# Patient Record
Sex: FEMALE | Race: WHITE | NOT HISPANIC OR LATINO | Employment: FULL TIME | URBAN - METROPOLITAN AREA
[De-identification: names, ages, dates, MRNs, and addresses within clinical notes are randomized per-mention and may not be internally consistent; named-entity substitution may affect disease eponyms.]

---

## 2019-04-15 ENCOUNTER — HOSPITAL ENCOUNTER (OUTPATIENT)
Dept: FAMILY MEDICINE CLINIC | Facility: CLINIC | Age: 44
Setting detail: SPECIMEN
Discharge: HOME | End: 2019-04-15
Attending: FAMILY MEDICINE | Admitting: FAMILY MEDICINE

## 2019-04-15 LAB
ALBUMIN SERPL-MCNC: 3.4 G/DL (ref 3.5–4.8)
ALBUMIN/GLOB SERPL: 1.3 {RATIO} (ref 1–1.7)
ALP SERPL-CCNC: 49 IU/L (ref 32–91)
ALT SERPL-CCNC: 19 IU/L (ref 14–54)
ANION GAP SERPL CALC-SCNC: 14.2 MMOL/L (ref 10–20)
AST SERPL-CCNC: 14 IU/L (ref 15–41)
BASOPHILS # BLD AUTO: 0.1 10*3/UL (ref 0–0.2)
BASOPHILS NFR BLD AUTO: 1 % (ref 0–2)
BILIRUB SERPL-MCNC: 0.8 MG/DL (ref 0.3–1.2)
BUN SERPL-MCNC: 10 MG/DL (ref 8–20)
BUN/CREAT SERPL: 14.3 (ref 5.4–26.2)
CALCIUM SERPL-MCNC: 8.8 MG/DL (ref 8.9–10.3)
CHLORIDE SERPL-SCNC: 103 MMOL/L (ref 101–111)
CHOLEST SERPL-MCNC: 245 MG/DL
CHOLEST/HDLC SERPL: 4 {RATIO}
CONV CO2: 24 MMOL/L (ref 22–32)
CONV LDL CHOLESTEROL DIRECT: 170 MG/DL (ref 0–100)
CONV TOTAL PROTEIN: 6 G/DL (ref 6.1–7.9)
CREAT UR-MCNC: 0.7 MG/DL (ref 0.4–1)
DIFFERENTIAL METHOD BLD: (no result)
EOSINOPHIL # BLD AUTO: 0.1 10*3/UL (ref 0–0.3)
EOSINOPHIL # BLD AUTO: 2 % (ref 0–3)
ERYTHROCYTE [DISTWIDTH] IN BLOOD BY AUTOMATED COUNT: 17.1 % (ref 11.5–14.5)
GLOBULIN UR ELPH-MCNC: 2.6 G/DL (ref 2.5–3.8)
GLUCOSE SERPL-MCNC: 86 MG/DL (ref 65–99)
HCT VFR BLD AUTO: 35.8 % (ref 35–49)
HDLC SERPL-MCNC: 61 MG/DL
HGB BLD-MCNC: 12 G/DL (ref 12–15)
IRON SATN MFR SERPL: 14 % (ref 15–50)
IRON SERPL-MCNC: 53 UG/DL (ref 28–170)
LDLC/HDLC SERPL: 2.8 {RATIO}
LIPID INTERPRETATION: ABNORMAL
LYMPHOCYTES # BLD AUTO: 1.5 10*3/UL (ref 0.8–4.8)
LYMPHOCYTES NFR BLD AUTO: 28 % (ref 18–42)
MCH RBC QN AUTO: 29.5 PG (ref 26–32)
MCHC RBC AUTO-ENTMCNC: 33.5 G/DL (ref 32–36)
MCV RBC AUTO: 88.2 FL (ref 80–94)
MONOCYTES # BLD AUTO: 0.3 10*3/UL (ref 0.1–1.3)
MONOCYTES NFR BLD AUTO: 5 % (ref 2–11)
NEUTROPHILS # BLD AUTO: 3.5 10*3/UL (ref 2.3–8.6)
NEUTROPHILS NFR BLD AUTO: 64 % (ref 50–75)
NRBC BLD AUTO-RTO: 0 /100{WBCS}
NRBC/RBC NFR BLD MANUAL: 0 10*3/UL
PLATELET # BLD AUTO: 214 10*3/UL (ref 150–450)
PMV BLD AUTO: 7.8 FL (ref 7.4–10.4)
POTASSIUM SERPL-SCNC: 4.2 MMOL/L (ref 3.6–5.1)
RBC # BLD AUTO: 4.05 10*6/UL (ref 4–5.4)
SODIUM SERPL-SCNC: 137 MMOL/L (ref 136–144)
TIBC SERPL-MCNC: 377 UG/DL (ref 228–428)
TRIGL SERPL-MCNC: 120 MG/DL
VLDLC SERPL CALC-MCNC: 14.7 MG/DL
WBC # BLD AUTO: 5.4 10*3/UL (ref 4.5–11.5)

## 2019-06-03 ENCOUNTER — HOSPITAL ENCOUNTER (OUTPATIENT)
Dept: FAMILY MEDICINE CLINIC | Facility: CLINIC | Age: 44
Setting detail: SPECIMEN
Discharge: HOME | End: 2019-06-03
Attending: FAMILY MEDICINE | Admitting: FAMILY MEDICINE

## 2019-06-03 LAB
ALBUMIN SERPL-MCNC: 3.8 G/DL (ref 3.5–4.8)
ALBUMIN/GLOB SERPL: 1.5 {RATIO} (ref 1–1.7)
ALP SERPL-CCNC: 51 IU/L (ref 32–91)
ALT SERPL-CCNC: 19 IU/L (ref 14–54)
ANION GAP SERPL CALC-SCNC: 17.1 MMOL/L (ref 10–20)
AST SERPL-CCNC: 25 IU/L (ref 15–41)
BASOPHILS # BLD AUTO: 0 10*3/UL (ref 0–0.2)
BASOPHILS NFR BLD AUTO: 1 % (ref 0–2)
BILIRUB SERPL-MCNC: 0.7 MG/DL (ref 0.3–1.2)
BUN SERPL-MCNC: 14 MG/DL (ref 8–20)
BUN/CREAT SERPL: 17.5 (ref 5.4–26.2)
CALCIUM SERPL-MCNC: 9.2 MG/DL (ref 8.9–10.3)
CHLORIDE SERPL-SCNC: 105 MMOL/L (ref 101–111)
CHOLEST SERPL-MCNC: 130 MG/DL
CHOLEST/HDLC SERPL: 2.8 {RATIO}
CONV CO2: 19 MMOL/L (ref 22–32)
CONV LDL CHOLESTEROL DIRECT: 80 MG/DL (ref 0–100)
CONV TOTAL PROTEIN: 6.4 G/DL (ref 6.1–7.9)
CREAT UR-MCNC: 0.8 MG/DL (ref 0.4–1)
DIFFERENTIAL METHOD BLD: (no result)
EOSINOPHIL # BLD AUTO: 0.2 10*3/UL (ref 0–0.3)
EOSINOPHIL # BLD AUTO: 4 % (ref 0–3)
ERYTHROCYTE [DISTWIDTH] IN BLOOD BY AUTOMATED COUNT: 16.3 % (ref 11.5–14.5)
FERRITIN SERPL-MCNC: 15 NG/ML (ref 11–307)
GLOBULIN UR ELPH-MCNC: 2.6 G/DL (ref 2.5–3.8)
GLUCOSE SERPL-MCNC: 100 MG/DL (ref 65–99)
HCT VFR BLD AUTO: 36.3 % (ref 35–49)
HDLC SERPL-MCNC: 46 MG/DL
HGB BLD-MCNC: 11.7 G/DL (ref 12–15)
IRON SATN MFR SERPL: 13 % (ref 15–50)
IRON SERPL-MCNC: 55 UG/DL (ref 28–170)
LDLC/HDLC SERPL: 1.8 {RATIO}
LIPID INTERPRETATION: NORMAL
LYMPHOCYTES # BLD AUTO: 1.4 10*3/UL (ref 0.8–4.8)
LYMPHOCYTES NFR BLD AUTO: 36 % (ref 18–42)
MCH RBC QN AUTO: 29.3 PG (ref 26–32)
MCHC RBC AUTO-ENTMCNC: 32.1 G/DL (ref 32–36)
MCV RBC AUTO: 91.3 FL (ref 80–94)
MONOCYTES # BLD AUTO: 0.3 10*3/UL (ref 0.1–1.3)
MONOCYTES NFR BLD AUTO: 7 % (ref 2–11)
NEUTROPHILS # BLD AUTO: 2.1 10*3/UL (ref 2.3–8.6)
NEUTROPHILS NFR BLD AUTO: 52 % (ref 50–75)
NRBC BLD AUTO-RTO: 0 /100{WBCS}
NRBC/RBC NFR BLD MANUAL: 0 10*3/UL
PLATELET # BLD AUTO: 269 10*3/UL (ref 150–450)
PMV BLD AUTO: 7.8 FL (ref 7.4–10.4)
POTASSIUM SERPL-SCNC: 4.1 MMOL/L (ref 3.6–5.1)
RBC # BLD AUTO: 3.98 10*6/UL (ref 4–5.4)
SODIUM SERPL-SCNC: 137 MMOL/L (ref 136–144)
TIBC SERPL-MCNC: 409 UG/DL (ref 228–428)
TRIGL SERPL-MCNC: 80 MG/DL
VLDLC SERPL CALC-MCNC: 4 MG/DL
WBC # BLD AUTO: 4.1 10*3/UL (ref 4.5–11.5)

## 2019-06-04 LAB — 25(OH)D3 SERPL-MCNC: 48 NG/ML (ref 30–100)

## 2019-06-25 NOTE — TELEPHONE ENCOUNTER
Pt called. She has COPD and uses Symbicort inh. She wants to know if a rescue inhaler can be sent in. She does not have one and feels like she needs it at times.

## 2019-06-27 ENCOUNTER — HOSPITAL ENCOUNTER (EMERGENCY)
Facility: HOSPITAL | Age: 44
Discharge: HOME | End: 2019-06-27
Attending: NURSE PRACTITIONER | Admitting: EMERGENCY MEDICINE

## 2019-06-27 ENCOUNTER — APPOINTMENT (OUTPATIENT)
Dept: GENERAL RADIOLOGY | Facility: HOSPITAL | Age: 44
End: 2019-06-27

## 2019-06-27 VITALS
RESPIRATION RATE: 18 BRPM | DIASTOLIC BLOOD PRESSURE: 65 MMHG | TEMPERATURE: 99.3 F | WEIGHT: 262.35 LBS | SYSTOLIC BLOOD PRESSURE: 133 MMHG | BODY MASS INDEX: 46.48 KG/M2 | OXYGEN SATURATION: 99 % | HEIGHT: 63 IN | HEART RATE: 95 BPM

## 2019-06-27 DIAGNOSIS — J44.1 COPD WITH ACUTE EXACERBATION (HCC): Primary | ICD-10-CM

## 2019-06-27 LAB
ALBUMIN SERPL-MCNC: 3.8 G/DL (ref 3.5–4.8)
ALBUMIN/GLOB SERPL: 1.4 G/DL (ref 1–1.7)
ALP SERPL-CCNC: 50 U/L (ref 32–91)
ALT SERPL W P-5'-P-CCNC: 19 U/L (ref 14–54)
ANION GAP SERPL CALCULATED.3IONS-SCNC: 12.9 MMOL/L (ref 10–20)
AST SERPL-CCNC: 25 U/L (ref 15–41)
BASOPHILS # BLD AUTO: 0.1 10*3/MM3 (ref 0–0.2)
BASOPHILS NFR BLD AUTO: 1.3 % (ref 0–1.5)
BILIRUB SERPL-MCNC: 0.6 MG/DL (ref 0.3–1.2)
BNP SERPL-MCNC: 12 PG/ML
BUN BLD-MCNC: 8 MG/DL (ref 8–20)
BUN/CREAT SERPL: 10 (ref 5.4–26.2)
CALCIUM SPEC-SCNC: 9.3 MG/DL (ref 8.9–10.3)
CHLORIDE SERPL-SCNC: 110 MMOL/L (ref 101–111)
CO2 SERPL-SCNC: 20 MMOL/L (ref 22–32)
CREAT BLD-MCNC: 0.8 MG/DL (ref 0.4–1)
DEPRECATED RDW RBC AUTO: 47.7 FL (ref 37–54)
EOSINOPHIL # BLD AUTO: 0.1 10*3/MM3 (ref 0–0.4)
EOSINOPHIL NFR BLD AUTO: 2.7 % (ref 0.3–6.2)
ERYTHROCYTE [DISTWIDTH] IN BLOOD BY AUTOMATED COUNT: 15.4 % (ref 12.3–15.4)
GFR SERPL CREATININE-BSD FRML MDRD: 77 ML/MIN/1.73
GLOBULIN UR ELPH-MCNC: 2.8 GM/DL (ref 2.5–3.8)
GLUCOSE BLD-MCNC: 93 MG/DL (ref 65–99)
HCT VFR BLD AUTO: 33.8 % (ref 34–46.6)
HGB BLD-MCNC: 11.3 G/DL (ref 12–15.9)
HOLD SPECIMEN: NORMAL
HOLD SPECIMEN: NORMAL
LYMPHOCYTES # BLD AUTO: 1.2 10*3/MM3 (ref 0.7–3.1)
LYMPHOCYTES NFR BLD AUTO: 23.3 % (ref 19.6–45.3)
MCH RBC QN AUTO: 29.3 PG (ref 26.6–33)
MCHC RBC AUTO-ENTMCNC: 33.4 G/DL (ref 31.5–35.7)
MCV RBC AUTO: 87.6 FL (ref 79–97)
MONOCYTES # BLD AUTO: 0.4 10*3/MM3 (ref 0.1–0.9)
MONOCYTES NFR BLD AUTO: 7.5 % (ref 5–12)
NEUTROPHILS # BLD AUTO: 3.4 10*3/MM3 (ref 1.7–7)
NEUTROPHILS NFR BLD AUTO: 65.2 % (ref 42.7–76)
NRBC BLD AUTO-RTO: 0.1 /100 WBC (ref 0–0.2)
PLATELET # BLD AUTO: 220 10*3/MM3 (ref 140–450)
PMV BLD AUTO: 7.8 FL (ref 6–12)
POTASSIUM BLD-SCNC: 3.9 MMOL/L (ref 3.6–5.1)
PROT SERPL-MCNC: 6.6 G/DL (ref 6.1–7.9)
RBC # BLD AUTO: 3.85 10*6/MM3 (ref 3.77–5.28)
SODIUM BLD-SCNC: 139 MMOL/L (ref 136–144)
TROPONIN I SERPL-MCNC: <0.03 NG/ML (ref 0–0.03)
WBC NRBC COR # BLD: 5.3 10*3/MM3 (ref 3.4–10.8)
WHOLE BLOOD HOLD SPECIMEN: NORMAL
WHOLE BLOOD HOLD SPECIMEN: NORMAL

## 2019-06-27 PROCEDURE — 99284 EMERGENCY DEPT VISIT MOD MDM: CPT

## 2019-06-27 PROCEDURE — 83880 ASSAY OF NATRIURETIC PEPTIDE: CPT | Performed by: NURSE PRACTITIONER

## 2019-06-27 PROCEDURE — 25010000002 METHYLPREDNISOLONE PER 125 MG: Performed by: EMERGENCY MEDICINE

## 2019-06-27 PROCEDURE — 71045 X-RAY EXAM CHEST 1 VIEW: CPT

## 2019-06-27 PROCEDURE — 80053 COMPREHEN METABOLIC PANEL: CPT | Performed by: NURSE PRACTITIONER

## 2019-06-27 PROCEDURE — 96374 THER/PROPH/DIAG INJ IV PUSH: CPT

## 2019-06-27 PROCEDURE — 85025 COMPLETE CBC W/AUTO DIFF WBC: CPT | Performed by: NURSE PRACTITIONER

## 2019-06-27 PROCEDURE — 84484 ASSAY OF TROPONIN QUANT: CPT | Performed by: NURSE PRACTITIONER

## 2019-06-27 PROCEDURE — 94640 AIRWAY INHALATION TREATMENT: CPT

## 2019-06-27 PROCEDURE — 93005 ELECTROCARDIOGRAM TRACING: CPT | Performed by: NURSE PRACTITIONER

## 2019-06-27 RX ORDER — METHYLPREDNISOLONE SODIUM SUCCINATE 125 MG/2ML
125 INJECTION, POWDER, LYOPHILIZED, FOR SOLUTION INTRAMUSCULAR; INTRAVENOUS ONCE
Status: COMPLETED | OUTPATIENT
Start: 2019-06-27 | End: 2019-06-27

## 2019-06-27 RX ORDER — ALBUTEROL SULFATE 90 UG/1
2 AEROSOL, METERED RESPIRATORY (INHALATION) EVERY 6 HOURS PRN
Qty: 1 INHALER | Refills: 0 | Status: SHIPPED | OUTPATIENT
Start: 2019-06-27 | End: 2019-07-03 | Stop reason: SDUPTHER

## 2019-06-27 RX ORDER — ALBUTEROL SULFATE 90 UG/1
2 AEROSOL, METERED RESPIRATORY (INHALATION) EVERY 6 HOURS PRN
Qty: 1 INHALER | Refills: 0 | Status: SHIPPED | OUTPATIENT
Start: 2019-06-27 | End: 2020-01-27 | Stop reason: SDUPTHER

## 2019-06-27 RX ORDER — IPRATROPIUM BROMIDE AND ALBUTEROL SULFATE 2.5; .5 MG/3ML; MG/3ML
3 SOLUTION RESPIRATORY (INHALATION) ONCE
Status: COMPLETED | OUTPATIENT
Start: 2019-06-27 | End: 2019-06-27

## 2019-06-27 RX ORDER — SODIUM CHLORIDE 0.9 % (FLUSH) 0.9 %
10 SYRINGE (ML) INJECTION AS NEEDED
Status: DISCONTINUED | OUTPATIENT
Start: 2019-06-27 | End: 2019-06-27 | Stop reason: HOSPADM

## 2019-06-27 RX ORDER — ALBUTEROL SULFATE 90 UG/1
2 AEROSOL, METERED RESPIRATORY (INHALATION) EVERY 4 HOURS PRN
Qty: 1 INHALER | Refills: 3 | Status: SHIPPED | OUTPATIENT
Start: 2019-06-27 | End: 2019-06-27 | Stop reason: SDUPTHER

## 2019-06-27 RX ADMIN — METHYLPREDNISOLONE SODIUM SUCCINATE 125 MG: 125 INJECTION, POWDER, FOR SOLUTION INTRAMUSCULAR; INTRAVENOUS at 19:59

## 2019-06-27 RX ADMIN — IPRATROPIUM BROMIDE AND ALBUTEROL SULFATE 3 ML: .5; 3 SOLUTION RESPIRATORY (INHALATION) at 19:46

## 2019-07-03 ENCOUNTER — OFFICE VISIT (OUTPATIENT)
Dept: FAMILY MEDICINE CLINIC | Facility: CLINIC | Age: 44
End: 2019-07-03

## 2019-07-03 VITALS
HEIGHT: 63 IN | SYSTOLIC BLOOD PRESSURE: 136 MMHG | DIASTOLIC BLOOD PRESSURE: 81 MMHG | WEIGHT: 255.2 LBS | HEART RATE: 94 BPM | TEMPERATURE: 97.9 F | BODY MASS INDEX: 45.22 KG/M2 | OXYGEN SATURATION: 97 %

## 2019-07-03 DIAGNOSIS — J44.1 COPD EXACERBATION (HCC): Primary | ICD-10-CM

## 2019-07-03 DIAGNOSIS — J44.9 CHRONIC OBSTRUCTIVE PULMONARY DISEASE, UNSPECIFIED COPD TYPE (HCC): ICD-10-CM

## 2019-07-03 PROBLEM — F41.0 PANIC ATTACK: Status: ACTIVE | Noted: 2019-04-10

## 2019-07-03 PROBLEM — G47.9 SLEEPING DIFFICULTIES: Status: ACTIVE | Noted: 2019-06-05

## 2019-07-03 PROBLEM — R20.2 HAND TINGLING: Status: ACTIVE | Noted: 2019-05-08

## 2019-07-03 PROBLEM — E78.5 DYSLIPIDEMIA: Status: ACTIVE | Noted: 2019-04-16

## 2019-07-03 PROBLEM — E55.9 VITAMIN D DEFICIENCY: Status: ACTIVE | Noted: 2019-04-16

## 2019-07-03 PROBLEM — F41.9 ANXIETY: Status: ACTIVE | Noted: 2019-05-08

## 2019-07-03 PROBLEM — E61.1 IRON DEFICIENCY: Status: ACTIVE | Noted: 2019-04-16

## 2019-07-03 PROBLEM — J45.909 ASTHMA: Status: ACTIVE | Noted: 2019-04-03

## 2019-07-03 PROBLEM — S63.502A UNSPECIFIED SPRAIN OF LEFT WRIST, INITIAL ENCOUNTER: Status: ACTIVE | Noted: 2019-04-10

## 2019-07-03 PROBLEM — M79.673 PAIN OF FOOT: Status: ACTIVE | Noted: 2019-04-10

## 2019-07-03 PROCEDURE — 99213 OFFICE O/P EST LOW 20 MIN: CPT | Performed by: NURSE PRACTITIONER

## 2019-07-03 RX ORDER — ERGOCALCIFEROL 1.25 MG/1
1 CAPSULE ORAL
COMMUNITY
Start: 2019-04-16 | End: 2020-04-08

## 2019-07-03 RX ORDER — THEOPHYLLINE 300 MG/1
300 TABLET, EXTENDED RELEASE ORAL DAILY
Refills: 11 | COMMUNITY
Start: 2019-05-14 | End: 2022-09-19

## 2019-07-03 RX ORDER — BUPROPION HYDROCHLORIDE 150 MG/1
150 TABLET, EXTENDED RELEASE ORAL 2 TIMES DAILY
Refills: 3 | COMMUNITY
Start: 2019-05-06 | End: 2019-08-07 | Stop reason: SDUPTHER

## 2019-07-03 RX ORDER — IPRATROPIUM BROMIDE AND ALBUTEROL SULFATE 2.5; .5 MG/3ML; MG/3ML
SOLUTION RESPIRATORY (INHALATION)
Refills: 0 | COMMUNITY
Start: 2019-03-30 | End: 2019-07-03

## 2019-07-03 RX ORDER — OMEGA-3 FATTY ACIDS/FISH OIL 300-1000MG
CAPSULE ORAL
COMMUNITY
End: 2020-04-02

## 2019-07-03 RX ORDER — ALBUTEROL SULFATE 0.63 MG/3ML
1 SOLUTION RESPIRATORY (INHALATION) EVERY 6 HOURS PRN
Qty: 360 ML | Refills: 3 | Status: SHIPPED | OUTPATIENT
Start: 2019-07-03

## 2019-07-03 RX ORDER — ROSUVASTATIN CALCIUM 10 MG/1
10 TABLET, COATED ORAL
Refills: 3 | COMMUNITY
Start: 2019-05-14 | End: 2019-08-14 | Stop reason: SDUPTHER

## 2019-07-03 RX ORDER — FERROUS SULFATE 325(65) MG
TABLET ORAL EVERY 12 HOURS
COMMUNITY
Start: 2019-04-16 | End: 2022-09-19

## 2019-07-03 RX ORDER — NAPROXEN SODIUM 220 MG
220 TABLET ORAL
COMMUNITY
End: 2020-04-02

## 2019-07-03 RX ORDER — BUDESONIDE AND FORMOTEROL FUMARATE DIHYDRATE 160; 4.5 UG/1; UG/1
2 AEROSOL RESPIRATORY (INHALATION) 2 TIMES DAILY
Qty: 1 INHALER | Refills: 2 | Status: SHIPPED | OUTPATIENT
Start: 2019-07-03 | End: 2020-01-27 | Stop reason: SDUPTHER

## 2019-07-03 RX ORDER — FEXOFENADINE HYDROCHLORIDE 60 MG/1
TABLET, FILM COATED ORAL
COMMUNITY
Start: 2019-05-08

## 2019-07-03 RX ORDER — PREDNISONE 10 MG/1
TABLET ORAL
Refills: 0 | COMMUNITY
Start: 2019-06-28 | End: 2019-07-30

## 2019-07-03 RX ORDER — BUDESONIDE AND FORMOTEROL FUMARATE DIHYDRATE 160; 4.5 UG/1; UG/1
2 AEROSOL RESPIRATORY (INHALATION) 2 TIMES DAILY
Refills: 1 | COMMUNITY
Start: 2019-04-26 | End: 2019-07-03 | Stop reason: SDUPTHER

## 2019-07-03 NOTE — PROGRESS NOTES
Subjective   Grace Clement is a 44 y.o. female.     Pt is here today with c/o SOA.  She went to [facility] on 6/27 with the same complaints.  For about 2 weeks she has been experiencing more shortness of air since the weather has been hotter.  She quit smoking 3 months ago.  She was given a taper dose of steroid in the hospital and was started on Ventolin.  She also takes symbicort.  She reports that her symptoms have improved some but she is still experiencing SOA.  She is having to use her nebulizer 3-4 times a day and albuterol every 1.5 hours.  She does not recall having pulmonary function tests done in the past.          The following portions of the patient's history were reviewed and updated as appropriate: allergies, current medications, past family history, past medical history, past social history, past surgical history and problem list.    Review of Systems   Constitutional: Negative for appetite change, chills, fatigue and fever.   HENT: Negative for congestion, ear pain, hearing loss, postnasal drip, rhinorrhea, sinus pressure, sore throat and trouble swallowing.    Eyes: Negative for blurred vision, double vision, pain, discharge, itching and visual disturbance.   Respiratory: Positive for cough, chest tightness, shortness of breath and wheezing.    Cardiovascular: Negative for chest pain and palpitations.   Gastrointestinal: Negative for abdominal pain, blood in stool, constipation, diarrhea, nausea and vomiting.   Genitourinary: Negative for dysuria, flank pain, frequency and urgency.   Musculoskeletal: Negative for arthralgias and myalgias.   Skin: Negative for rash and skin lesions.   Neurological: Negative for dizziness, weakness, numbness and headache.   Psychiatric/Behavioral: Negative for stress. The patient is not nervous/anxious.        Objective   /81 (BP Location: Left arm, Patient Position: Sitting, Cuff Size: Large Adult)   Pulse 94   Temp 97.9 °F (36.6 °C) (Oral)   Ht 160 cm  "(63\")   Wt 116 kg (255 lb 3.2 oz)   LMP 06/13/2019   SpO2 97%   BMI 45.21 kg/m²   Physical Exam   Constitutional: She is oriented to person, place, and time. She appears well-developed and well-nourished. No distress.   HENT:   Head: Normocephalic and atraumatic.   Eyes: Conjunctivae and EOM are normal. Pupils are equal, round, and reactive to light. Right eye exhibits no discharge. Left eye exhibits no discharge.   Neck: Normal range of motion. Neck supple.   Cardiovascular: Normal rate and regular rhythm.   Pulmonary/Chest: Effort normal and breath sounds normal. No respiratory distress. She has no wheezes. She has no rales.   Abdominal: Soft. Normal appearance and bowel sounds are normal. She exhibits no distension. There is no tenderness.   Musculoskeletal: Normal range of motion.   Neurological: She is alert and oriented to person, place, and time.   Skin: Skin is warm and dry. She is not diaphoretic.   Psychiatric: She has a normal mood and affect. Her behavior is normal. Thought content normal.   Vitals reviewed.        Assessment/Plan   Problems Addressed this Visit     None      Visit Diagnoses     COPD exacerbation (CMS/[facility])    -  Primary    continue steroid taper from ER  stop duoneb, start albuterol neb  cont ventolin as needed  start spiriva daily  cont symbicort  PFTs ordered    Relevant Medications    fexofenadine (ALLEGRA) 60 MG tablet    predniSONE (DELTASONE) 10 MG tablet    theophylline (THEODUR) 300 MG 12 hr tablet    tiotropium (SPIRIVA HANDIHALER) 18 MCG per inhalation capsule    albuterol (ACCUNEB) 0.63 MG/3ML nebulizer solution    SYMBICORT 160-4.5 MCG/ACT inhaler    Other Relevant Orders    Full Pulmonary Function Test With Bronchodilator    Chronic obstructive pulmonary disease, unspecified COPD type (CMS/[facility])        Relevant Medications    fexofenadine (ALLEGRA) 60 MG tablet    predniSONE (DELTASONE) 10 MG tablet    theophylline (THEODUR) 300 MG 12 hr tablet    tiotropium (SPIRIVA " HANDIHALER) 18 MCG per inhalation capsule    albuterol (ACCUNEB) 0.63 MG/3ML nebulizer solution    SYMBICORT 160-4.5 MCG/ACT inhaler    Other Relevant Orders    Full Pulmonary Function Test With Bronchodilator              Diagnoses and all orders for this visit:    1. COPD exacerbation (CMS/[facility]) (Primary)  Comments:  continue steroid taper from ER  stop duoneb, start albuterol neb  cont ventolin as needed  start spiriva daily  cont symbicort  PFTs ordered  Orders:  -     Full Pulmonary Function Test With Bronchodilator; Future  -     tiotropium (SPIRIVA HANDIHALER) 18 MCG per inhalation capsule; Place 1 capsule into inhaler and inhale Daily.  Dispense: 30 capsule; Refill: 1  -     albuterol (ACCUNEB) 0.63 MG/3ML nebulizer solution; Take 3 mL by nebulization Every 6 (Six) Hours As Needed for Wheezing.  Dispense: 360 mL; Refill: 3    2. Chronic obstructive pulmonary disease, unspecified COPD type (CMS/[facility])  -     Full Pulmonary Function Test With Bronchodilator; Future  -     tiotropium (SPIRIVA HANDIHALER) 18 MCG per inhalation capsule; Place 1 capsule into inhaler and inhale Daily.  Dispense: 30 capsule; Refill: 1  -     albuterol (ACCUNEB) 0.63 MG/3ML nebulizer solution; Take 3 mL by nebulization Every 6 (Six) Hours As Needed for Wheezing.  Dispense: 360 mL; Refill: 3    Other orders  -     SYMBICORT 160-4.5 MCG/ACT inhaler; Inhale 2 puffs 2 (Two) Times a Day.  Dispense: 1 inhaler; Refill: 2

## 2019-07-03 NOTE — PATIENT INSTRUCTIONS
Stop duoneb  Start albuterol neb every 6 hrs as needed- space out if using ventolin  Start spiriva daily  Continue symbicort  Get pulmonary function tests  If symptoms worsen follow up or make appt with Dr. Kadie Harper at [phone], option 3, then option 1 and let me know how you are doing next week

## 2019-07-08 ENCOUNTER — HOSPITAL ENCOUNTER (OUTPATIENT)
Dept: RESPIRATORY THERAPY | Facility: HOSPITAL | Age: 44
Discharge: HOME | End: 2019-07-08
Admitting: NURSE PRACTITIONER

## 2019-07-08 VITALS
WEIGHT: 257 LBS | BODY MASS INDEX: 45.54 KG/M2 | OXYGEN SATURATION: 99 % | RESPIRATION RATE: 12 BRPM | HEART RATE: 89 BPM | HEIGHT: 63 IN

## 2019-07-08 DIAGNOSIS — J44.1 COPD EXACERBATION (HCC): ICD-10-CM

## 2019-07-08 DIAGNOSIS — J44.9 CHRONIC OBSTRUCTIVE PULMONARY DISEASE, UNSPECIFIED COPD TYPE (HCC): ICD-10-CM

## 2019-07-08 PROCEDURE — 94729 DIFFUSING CAPACITY: CPT

## 2019-07-08 PROCEDURE — 94726 PLETHYSMOGRAPHY LUNG VOLUMES: CPT

## 2019-07-08 PROCEDURE — 94060 EVALUATION OF WHEEZING: CPT

## 2019-07-08 RX ORDER — ALBUTEROL SULFATE 2.5 MG/3ML
2.5 SOLUTION RESPIRATORY (INHALATION) ONCE
Status: COMPLETED | OUTPATIENT
Start: 2019-07-08 | End: 2019-07-08

## 2019-07-08 RX ADMIN — ALBUTEROL SULFATE 2.5 MG: 2.5 SOLUTION RESPIRATORY (INHALATION) at 08:18

## 2019-07-11 ENCOUNTER — TELEPHONE (OUTPATIENT)
Dept: FAMILY MEDICINE CLINIC | Facility: CLINIC | Age: 44
End: 2019-07-11

## 2019-07-11 NOTE — TELEPHONE ENCOUNTER
PFT shows mild to moderate COPD and asthma, if patient is not improving with the use of inhalers we will send to pulmonary

## 2019-07-26 RX ORDER — BUDESONIDE AND FORMOTEROL FUMARATE DIHYDRATE 160; 4.5 UG/1; UG/1
2 AEROSOL RESPIRATORY (INHALATION) 2 TIMES DAILY
Qty: 1 INHALER | Refills: 2 | OUTPATIENT
Start: 2019-07-26

## 2019-07-30 ENCOUNTER — OFFICE VISIT (OUTPATIENT)
Dept: FAMILY MEDICINE CLINIC | Facility: CLINIC | Age: 44
End: 2019-07-30

## 2019-07-30 VITALS
OXYGEN SATURATION: 99 % | WEIGHT: 261 LBS | DIASTOLIC BLOOD PRESSURE: 83 MMHG | SYSTOLIC BLOOD PRESSURE: 152 MMHG | HEIGHT: 63 IN | BODY MASS INDEX: 46.25 KG/M2 | HEART RATE: 92 BPM

## 2019-07-30 DIAGNOSIS — J44.9 CHRONIC OBSTRUCTIVE PULMONARY DISEASE, UNSPECIFIED COPD TYPE (HCC): ICD-10-CM

## 2019-07-30 DIAGNOSIS — R20.9 BILATERAL COLD FEET: Primary | ICD-10-CM

## 2019-07-30 DIAGNOSIS — I10 HYPERTENSION, UNSPECIFIED TYPE: ICD-10-CM

## 2019-07-30 DIAGNOSIS — R60.9 EDEMA, UNSPECIFIED TYPE: ICD-10-CM

## 2019-07-30 PROCEDURE — 99213 OFFICE O/P EST LOW 20 MIN: CPT | Performed by: NURSE PRACTITIONER

## 2019-07-30 RX ORDER — HYDROCHLOROTHIAZIDE 25 MG/1
25 TABLET ORAL DAILY
Qty: 30 TABLET | Refills: 1 | Status: SHIPPED | OUTPATIENT
Start: 2019-07-30 | End: 2019-07-30

## 2019-07-30 RX ORDER — MONTELUKAST SODIUM 10 MG/1
10 TABLET ORAL NIGHTLY
Qty: 30 TABLET | Refills: 1 | Status: SHIPPED | OUTPATIENT
Start: 2019-07-30 | End: 2019-08-22 | Stop reason: SDUPTHER

## 2019-07-30 NOTE — PROGRESS NOTES
"Subjective   Grace Clement is a 44 y.o. female.     Pt is here today with c/o cold feet and COPD.  She states that her feet have been ice cold and cramping off and on randomly for the last 2-3 weeks.  She states that it feels like a keven horse in her foot with no aggravating factors. It happens on a daily basis.   She states that when she is up moving through the day her feet do not feel cold.  She reports that they get cold when she has been sitting for a while. She reports that she keeps her house cold.  She states that they feel frozen internally but are not cold to touch. She has some occasional swelling in her legs. Pt wanted to discuss the results of her PFTs.  I advised her that she has mild to moderate COPD and asthma.  She states that the Spiriva has been doing wonderful for her.  She still does have a cough that comes and goes.  She feels like it is allergy related.         The following portions of the patient's history were reviewed and updated as appropriate: allergies, current medications, past family history, past medical history, past social history, past surgical history and problem list.    Review of Systems   Constitutional: Positive for fatigue. Negative for fever.   HENT: Positive for postnasal drip and sinus pressure.    Respiratory: Positive for cough, shortness of breath and wheezing. Negative for chest tightness.    Cardiovascular: Negative for chest pain and palpitations.   Skin: Negative for color change.   Neurological: Negative for dizziness, light-headedness and headache.        Cold sensation in feet       Objective   /83 (BP Location: Left arm, Patient Position: Sitting, Cuff Size: Large Adult)   Pulse 92   Ht 160 cm (63\")   Wt 118 kg (261 lb)   SpO2 99%   BMI 46.23 kg/m²   Physical Exam   Constitutional: She appears well-developed and well-nourished.   HENT:   Head: Normocephalic and atraumatic.   Eyes: EOM are normal. Pupils are equal, round, and reactive to light. "   Neck: Normal range of motion. Neck supple.   Cardiovascular: Normal rate and regular rhythm.   Pulmonary/Chest: Effort normal and breath sounds normal.   Abdominal: Soft. Bowel sounds are normal.   Musculoskeletal: She exhibits edema (1 pitting BLE).   Skin: Skin is warm and dry.   Psychiatric: She has a normal mood and affect. Her behavior is normal. Judgment and thought content normal.         Assessment/Plan   Problems Addressed this Visit     None      Visit Diagnoses     Bilateral cold feet    -  Primary    warm to touch  feels cold internally  wear compression stockings  drink plenty of water  normal pulses  elevate feet when possible    Chronic obstructive pulmonary disease, unspecified COPD type (CMS/HCC)        cont current meds, add singuilar for COPD and asthma    Relevant Medications    montelukast (SINGULAIR) 10 MG tablet    Edema, unspecified type        wear compression stockings  limit sodium intake    Hypertension, unspecified type        limit sodium intake              Diagnoses and all orders for this visit:    1. Bilateral cold feet (Primary)  Comments:  warm to touch  feels cold internally  wear compression stockings  drink plenty of water  normal pulses  elevate feet when possible    2. Chronic obstructive pulmonary disease, unspecified COPD type (CMS/HCC)  Comments:  cont current meds, add singuilar for COPD and asthma  Orders:  -     montelukast (SINGULAIR) 10 MG tablet; Take 1 tablet by mouth Every Night.  Dispense: 30 tablet; Refill: 1    3. Edema, unspecified type  Comments:  wear compression stockings  limit sodium intake  Orders:  -     Discontinue: hydrochlorothiazide (HYDRODIURIL) 25 MG tablet; Take 1 tablet by mouth Daily.  Dispense: 30 tablet; Refill: 1    4. Hypertension, unspecified type  Comments:  limit sodium intake  Orders:  -     Discontinue: hydrochlorothiazide (HYDRODIURIL) 25 MG tablet; Take 1 tablet by mouth Daily.  Dispense: 30 tablet; Refill: 1

## 2019-07-30 NOTE — PATIENT INSTRUCTIONS
Cont to use Flonase  Start taking Singulair  Start taking magnesium 400 mg OTC at night to help with keven horses  Drink plenty of water  Wear compression stockings to help with swelling and circulation  Limit sodium intake  Elevate feet when possible  Call if symptoms not improving

## 2019-08-01 RX ORDER — ERGOCALCIFEROL 1.25 MG/1
CAPSULE ORAL
Qty: 12 CAPSULE | Refills: 1 | OUTPATIENT
Start: 2019-08-01

## 2019-08-07 RX ORDER — BUPROPION HYDROCHLORIDE 150 MG/1
TABLET, EXTENDED RELEASE ORAL
Qty: 180 TABLET | Refills: 1 | Status: SHIPPED | OUTPATIENT
Start: 2019-08-07 | End: 2020-04-29 | Stop reason: SDUPTHER

## 2019-08-14 RX ORDER — ROSUVASTATIN CALCIUM 10 MG/1
TABLET, COATED ORAL
Qty: 90 TABLET | Refills: 1 | Status: SHIPPED | OUTPATIENT
Start: 2019-08-14 | End: 2020-04-29 | Stop reason: SDUPTHER

## 2019-08-22 DIAGNOSIS — J44.9 CHRONIC OBSTRUCTIVE PULMONARY DISEASE, UNSPECIFIED COPD TYPE (HCC): ICD-10-CM

## 2019-08-22 DIAGNOSIS — J44.1 COPD EXACERBATION (HCC): ICD-10-CM

## 2019-08-22 RX ORDER — MONTELUKAST SODIUM 10 MG/1
10 TABLET ORAL NIGHTLY
Qty: 30 TABLET | Refills: 1 | Status: SHIPPED | OUTPATIENT
Start: 2019-08-22 | End: 2019-09-09 | Stop reason: SDUPTHER

## 2019-08-22 NOTE — TELEPHONE ENCOUNTER
Refill spiriva and singulair  Also requesting 90 day supply on hctz 25mg tab -take 1 po daily.  I don't see it on active med list?  Lv:7/30/19

## 2019-08-22 NOTE — TELEPHONE ENCOUNTER
Please call and verify with pt on he Hctz.  She was not supposed to start that.  I had ordered it but canceled it shortly after.  If she is taking is he swelling better or cramping worse?  Also how is her BP.

## 2019-09-09 DIAGNOSIS — J44.1 COPD EXACERBATION (HCC): ICD-10-CM

## 2019-09-09 DIAGNOSIS — J44.9 CHRONIC OBSTRUCTIVE PULMONARY DISEASE, UNSPECIFIED COPD TYPE (HCC): ICD-10-CM

## 2019-09-09 RX ORDER — MONTELUKAST SODIUM 10 MG/1
10 TABLET ORAL NIGHTLY
Qty: 30 TABLET | Refills: 1 | Status: SHIPPED | OUTPATIENT
Start: 2019-09-09 | End: 2019-09-10 | Stop reason: SDUPTHER

## 2019-09-10 DIAGNOSIS — J44.9 CHRONIC OBSTRUCTIVE PULMONARY DISEASE, UNSPECIFIED COPD TYPE (HCC): ICD-10-CM

## 2019-09-10 DIAGNOSIS — J44.1 COPD EXACERBATION (HCC): ICD-10-CM

## 2019-09-10 RX ORDER — MONTELUKAST SODIUM 10 MG/1
10 TABLET ORAL NIGHTLY
Qty: 90 TABLET | Refills: 1 | Status: SHIPPED | OUTPATIENT
Start: 2019-09-10 | End: 2020-01-27 | Stop reason: SDUPTHER

## 2020-01-27 ENCOUNTER — OFFICE VISIT (OUTPATIENT)
Dept: FAMILY MEDICINE CLINIC | Facility: CLINIC | Age: 45
End: 2020-01-27

## 2020-01-27 VITALS
HEIGHT: 62 IN | HEART RATE: 81 BPM | WEIGHT: 283.2 LBS | RESPIRATION RATE: 16 BRPM | BODY MASS INDEX: 52.12 KG/M2 | DIASTOLIC BLOOD PRESSURE: 77 MMHG | TEMPERATURE: 98 F | OXYGEN SATURATION: 98 % | SYSTOLIC BLOOD PRESSURE: 132 MMHG

## 2020-01-27 DIAGNOSIS — D64.9 ANEMIA, UNSPECIFIED TYPE: ICD-10-CM

## 2020-01-27 DIAGNOSIS — R20.2 NUMBNESS AND TINGLING: Primary | ICD-10-CM

## 2020-01-27 DIAGNOSIS — J44.9 CHRONIC OBSTRUCTIVE PULMONARY DISEASE, UNSPECIFIED COPD TYPE (HCC): ICD-10-CM

## 2020-01-27 DIAGNOSIS — E66.01 MORBID OBESITY WITH BMI OF 50.0-59.9, ADULT (HCC): ICD-10-CM

## 2020-01-27 DIAGNOSIS — E78.5 HYPERLIPIDEMIA, UNSPECIFIED HYPERLIPIDEMIA TYPE: ICD-10-CM

## 2020-01-27 DIAGNOSIS — R79.89 LOW VITAMIN D LEVEL: ICD-10-CM

## 2020-01-27 DIAGNOSIS — R20.0 NUMBNESS AND TINGLING: Primary | ICD-10-CM

## 2020-01-27 PROCEDURE — 99214 OFFICE O/P EST MOD 30 MIN: CPT | Performed by: NURSE PRACTITIONER

## 2020-01-27 RX ORDER — MONTELUKAST SODIUM 10 MG/1
10 TABLET ORAL NIGHTLY
Qty: 90 TABLET | Refills: 1 | Status: SHIPPED | OUTPATIENT
Start: 2020-01-27 | End: 2022-09-19 | Stop reason: SDUPTHER

## 2020-01-27 RX ORDER — ALBUTEROL SULFATE 90 UG/1
2 AEROSOL, METERED RESPIRATORY (INHALATION) EVERY 6 HOURS PRN
Qty: 1 INHALER | Refills: 0 | Status: SHIPPED | OUTPATIENT
Start: 2020-01-27 | End: 2020-01-27

## 2020-01-27 RX ORDER — BUDESONIDE AND FORMOTEROL FUMARATE DIHYDRATE 160; 4.5 UG/1; UG/1
2 AEROSOL RESPIRATORY (INHALATION) 2 TIMES DAILY
Qty: 1 INHALER | Refills: 2 | Status: SHIPPED | OUTPATIENT
Start: 2020-01-27 | End: 2022-09-19

## 2020-01-27 RX ORDER — ALBUTEROL SULFATE 90 UG/1
2 AEROSOL, METERED RESPIRATORY (INHALATION) EVERY 6 HOURS PRN
Qty: 1 INHALER | Refills: 0 | Status: SHIPPED | OUTPATIENT
Start: 2020-01-27 | End: 2021-05-06

## 2020-01-27 NOTE — PROGRESS NOTES
"Subjective   Grace Clement is a 44 y.o. female.     Pt is here today with c/o bill feet numbness and tingling and \"cold sensation.\"  She has been having these sensations for about 1 year.  She has tried wearing compression hose, which help some.  Patient does not have a history of diabetes or back issues.  She denies any pain in the legs.  Has good pulses and color.    Pt states that she is at her heaviest and is wanting to lose weight.  She and her  have been talking about joining a gym.  She is trying to reduce her soda intake.  She has been trying to make diet changes.  She bakes more food and does not jalloh food.  Denies eating fast food often.        The following portions of the patient's history were reviewed and updated as appropriate: allergies, current medications, past family history, past medical history, past social history, past surgical history and problem list.    Review of Systems   Constitutional: Positive for fatigue. Negative for chills and fever.   Respiratory: Positive for shortness of breath (d/t COPD). Negative for chest tightness.    Cardiovascular: Negative for chest pain and palpitations.   Gastrointestinal: Negative for abdominal pain, constipation, diarrhea, nausea and vomiting.   Neurological: Positive for numbness. Negative for dizziness and headache.   Psychiatric/Behavioral: Positive for stress.       Objective   /77 (BP Location: Left arm, Patient Position: Sitting, Cuff Size: Large Adult)   Pulse 81   Temp 98 °F (36.7 °C) (Oral)   Resp 16   Ht 157.5 cm (62\")   Wt 128 kg (283 lb 3.2 oz)   LMP 01/13/2020   SpO2 98%   BMI 51.80 kg/m²   Physical Exam   Constitutional: She is oriented to person, place, and time. She appears well-developed and well-nourished. No distress.   obese   HENT:   Head: Normocephalic and atraumatic.   Cardiovascular: Normal rate, regular rhythm, normal heart sounds and intact distal pulses.   Pulmonary/Chest: Effort normal and breath sounds " normal. No respiratory distress. She has no wheezes.   Musculoskeletal: Normal range of motion. She exhibits no edema or tenderness.       Neurological Sensory Findings -  Altered sharp/dull right ankle/foot discrimination and altered sharp/dull left ankle/foot discrimination.  Vascular Status -  Her right foot exhibits normal foot vasculature  and no edema. Her left foot exhibits normal foot vasculature  and no edema.  Skin Integrity  -  Her right foot skin is intact.Her left foot skin is intact..  Neurological: She is alert and oriented to person, place, and time.   Skin: Skin is warm and dry. No erythema.   Psychiatric: She has a normal mood and affect. Her behavior is normal. Judgment and thought content normal.         Assessment/Plan     Diagnoses and all orders for this visit:    1. Numbness and tingling (Primary)  Comments:  possible neuropathy  check EMG  check labs  recent A1C normal  Orders:  -     Comprehensive Metabolic Panel; Future  -     Vitamin B12; Future  -     CBC & Differential  -     EMG 2 Limbs; Future    2. Anemia, unspecified type  Comments:  check level  Orders:  -     Comprehensive Metabolic Panel; Future  -     CBC & Differential    3. Low vitamin D level  Comments:  check level  Orders:  -     Vitamin D 25 hydroxy; Future    4. Chronic obstructive pulmonary disease, unspecified COPD type (CMS/[facility])  Comments:  cont meds  Orders:  -     tiotropium (SPIRIVA HANDIHALER) 18 MCG per inhalation capsule; Place 1 capsule into inhaler and inhale Daily.  Dispense: 30 capsule; Refill: 6  -     SYMBICORT 160-4.5 MCG/ACT inhaler; Inhale 2 puffs 2 (Two) Times a Day.  Dispense: 1 inhaler; Refill: 2  -     Discontinue: albuterol sulfate HFA (VENTOLIN HFA) 108 (90 Base) MCG/ACT inhaler; Inhale 2 puffs Every 6 (Six) Hours As Needed for Wheezing.  Dispense: 1 inhaler; Refill: 0  -     montelukast (SINGULAIR) 10 MG tablet; Take 1 tablet by mouth Every Night.  Dispense: 90 tablet; Refill: 1  -     albuterol  sulfate HFA (VENTOLIN HFA) 108 (90 Base) MCG/ACT inhaler; Inhale 2 puffs Every 6 (Six) Hours As Needed for Wheezing.  Dispense: 1 inhaler; Refill: 0    5. Hyperlipidemia, unspecified hyperlipidemia type  Comments:  check labs  Orders:  -     Lipid panel; Future    6. Morbid obesity with BMI of 50.0-59.9, adult (CMS/[facility])  Comments:  work on diet and exercise  weight management referral  Patient given diet plan handouts in office  Orders:  -     Ambulatory Referral to Weight Management Program

## 2020-01-27 NOTE — PATIENT INSTRUCTIONS
Get labs checked  Have nerve conduction study (EMG) completed on both legs  Referral to weight management   Work on diet and exercise  Call for any issues or concerns

## 2020-02-05 ENCOUNTER — PROCEDURE VISIT (OUTPATIENT)
Dept: NEUROLOGY | Facility: CLINIC | Age: 45
End: 2020-02-05

## 2020-02-05 DIAGNOSIS — M79.671 PAIN IN BOTH FEET: Primary | ICD-10-CM

## 2020-02-05 DIAGNOSIS — M79.672 PAIN IN BOTH FEET: Primary | ICD-10-CM

## 2020-02-05 PROCEDURE — 95909 NRV CNDJ TST 5-6 STUDIES: CPT | Performed by: PSYCHIATRY & NEUROLOGY

## 2020-02-05 PROCEDURE — 95885 MUSC TST DONE W/NERV TST LIM: CPT | Performed by: PSYCHIATRY & NEUROLOGY

## 2020-02-05 NOTE — PROGRESS NOTES
EMG and Nerve Conduction Studies    The complete report includes the data sheets.     Referring Doctor: Jaye Ogden APRN     History: This patient complains of pain /cold feeling in feet and occasional sharp pains.    Results:    1.  The skin Temp was 91F    2.  The bilateral sural sensory stet were normal.    3.  The left peroneal motor study was normal    4.  The bilateral tibial motor studies were essentially normal .  The amplitude of the CMAP with proximal stimulation were low but this is felt likely due to technical factors associated with obesity.     5  EMG of the muscles tested in the bilateral L4-S1 myotomes were normal.      Impression:    This is a normal study of the lower extremities.      Joseph Seipel, M.D.

## 2020-04-01 NOTE — PROGRESS NOTES
Subjective   Grace Clement is a 45 y.o. female.     Patient is here today with complaints of bilateral feet numbness/tingling and cold sensation and hand pain and numbness.  She was seen for this 3 months ago.     BLE discomfort- She underwent an EMG of bilateral lower extremities, which was normal. Labs were unremarkable last year. Patient is a former smoker. She reports that she continues to have pain in BLE that keeps her up at night.  Her feet feel cold internally. She has tried taking aleve and ibuprofen with minimal relief.     Hand pain/ numbness- She states that when she is using her phone and playing a game she feels like her hands start to cramp up and she gets pain in her right index finger joint and down the ulnar side of her hand. She states that they feel like they are falling asleep.  She wears a brace on her right hand at night because the hand falls asleep.  She gets an aching sensation in her hands. Right is always worse than left. She is concerned that it could be arthritis. She states that this has been occurring for about 1 year.           The following portions of the patient's history were reviewed and updated as appropriate: allergies, current medications, past family history, past medical history, past social history, past surgical history and problem list.    Review of Systems   Constitutional: Negative for chills, fatigue and fever.   HENT: Negative for congestion.    Eyes: Negative for blurred vision and double vision.   Respiratory: Positive for cough (COPD). Negative for chest tightness and shortness of breath.    Cardiovascular: Negative for chest pain, palpitations and leg swelling.   Gastrointestinal: Negative for abdominal pain, blood in stool, diarrhea, nausea and vomiting.   Musculoskeletal: Positive for arthralgias and myalgias.   Neurological: Positive for numbness. Negative for dizziness, weakness and headache.       Objective   /77 (BP Location: Left arm, Patient  "Position: Sitting, Cuff Size: Large Adult)   Pulse 95   Temp 98 °F (36.7 °C) (Oral)   Ht 157.5 cm (62\")   Wt 127 kg (281 lb)   SpO2 98%   BMI 51.40 kg/m²   Physical Exam   Constitutional: She is oriented to person, place, and time. She appears well-developed and well-nourished. No distress.   HENT:   Head: Normocephalic and atraumatic.   Eyes: Pupils are equal, round, and reactive to light. EOM are normal.   Cardiovascular: Normal rate, regular rhythm, normal heart sounds and intact distal pulses.   Pulmonary/Chest: Effort normal and breath sounds normal. She has no wheezes.   Musculoskeletal: Normal range of motion. She exhibits no edema.   Neurological: She is alert and oriented to person, place, and time.   Numbness noted with phalen test in left hand- normal tinels test.   Psychiatric: She has a normal mood and affect. Her behavior is normal. Judgment and thought content normal.         Assessment/Plan     Diagnoses and all orders for this visit:    1. Numbness and tingling (Primary)  Comments:  in BLE and BUE  BUE- possible carpal tunnel  R/O RA  BLE- check EUGENIA  Check B1 and B12 levels    Orders:  -     Doppler Ankle Brachial Index Single Level CAR; Future  -     Vitamin B1, Whole Blood; Future  -     Rheumatoid factor; Future  -     C-reactive protein; Future  -     PHILIPPE; Future  -     meloxicam (Mobic) 15 MG tablet; Take 1 tablet by mouth Daily.  Dispense: 30 tablet; Refill: 0    2. Sensation of cold in lower extremity  Comments:  EMG was normal  will obtain ABIs to rule out arterial insufficiency  Check B1 and B12  if everything normal refer to neuro  Orders:  -     Doppler Ankle Brachial Index Single Level CAR; Future  -     Vitamin B1, Whole Blood; Future  -     Rheumatoid factor; Future  -     C-reactive protein; Future  -     PHILIPPE; Future  -     meloxicam (Mobic) 15 MG tablet; Take 1 tablet by mouth Daily.  Dispense: 30 tablet; Refill: 0    3. Arthralgia of both hands  Comments:  possible carpal " tunnel  cont braces  start mobic  check labs  refer to [facility]  Orders:  -     Rheumatoid factor; Future  -     C-reactive protein; Future  -     PHILIPPE; Future  -     meloxicam (Mobic) 15 MG tablet; Take 1 tablet by mouth Daily.  Dispense: 30 tablet; Refill: 0  -     Ambulatory Referral to Hand Surgery

## 2020-04-02 ENCOUNTER — OFFICE VISIT (OUTPATIENT)
Dept: FAMILY MEDICINE CLINIC | Facility: CLINIC | Age: 45
End: 2020-04-02

## 2020-04-02 VITALS
OXYGEN SATURATION: 98 % | HEART RATE: 95 BPM | SYSTOLIC BLOOD PRESSURE: 136 MMHG | HEIGHT: 62 IN | DIASTOLIC BLOOD PRESSURE: 77 MMHG | BODY MASS INDEX: 51.71 KG/M2 | TEMPERATURE: 98 F | WEIGHT: 281 LBS

## 2020-04-02 DIAGNOSIS — R20.0 NUMBNESS AND TINGLING: Primary | ICD-10-CM

## 2020-04-02 DIAGNOSIS — M25.542 ARTHRALGIA OF BOTH HANDS: ICD-10-CM

## 2020-04-02 DIAGNOSIS — R20.9 SENSATION OF COLD IN LOWER EXTREMITY: ICD-10-CM

## 2020-04-02 DIAGNOSIS — M25.541 ARTHRALGIA OF BOTH HANDS: ICD-10-CM

## 2020-04-02 DIAGNOSIS — R20.2 NUMBNESS AND TINGLING: Primary | ICD-10-CM

## 2020-04-02 PROCEDURE — 99214 OFFICE O/P EST MOD 30 MIN: CPT | Performed by: NURSE PRACTITIONER

## 2020-04-02 RX ORDER — MELOXICAM 15 MG/1
15 TABLET ORAL DAILY
Qty: 30 TABLET | Refills: 0 | Status: SHIPPED | OUTPATIENT
Start: 2020-04-02 | End: 2020-04-29

## 2020-04-02 NOTE — PATIENT INSTRUCTIONS
Stop aleve and ibuprofen- start taking meloxicam daily  Continue to wear brace on hands  Obtain labs  Complete study of arteries in lower extremities  Referral to a hand specialist- [facility]  Call if no improvement    Get phone number for weight management program

## 2020-04-03 ENCOUNTER — LAB (OUTPATIENT)
Dept: FAMILY MEDICINE CLINIC | Facility: CLINIC | Age: 45
End: 2020-04-03

## 2020-04-03 DIAGNOSIS — R20.9 SENSATION OF COLD IN LOWER EXTREMITY: ICD-10-CM

## 2020-04-03 DIAGNOSIS — M25.542 ARTHRALGIA OF BOTH HANDS: ICD-10-CM

## 2020-04-03 DIAGNOSIS — R20.2 NUMBNESS AND TINGLING: ICD-10-CM

## 2020-04-03 DIAGNOSIS — R20.0 NUMBNESS AND TINGLING: ICD-10-CM

## 2020-04-03 DIAGNOSIS — D64.9 ANEMIA, UNSPECIFIED TYPE: ICD-10-CM

## 2020-04-03 DIAGNOSIS — R79.89 LOW VITAMIN D LEVEL: ICD-10-CM

## 2020-04-03 DIAGNOSIS — M25.541 ARTHRALGIA OF BOTH HANDS: ICD-10-CM

## 2020-04-03 DIAGNOSIS — E78.5 HYPERLIPIDEMIA, UNSPECIFIED HYPERLIPIDEMIA TYPE: ICD-10-CM

## 2020-04-03 LAB
25(OH)D3 SERPL-MCNC: 16 NG/ML (ref 30–100)
ALBUMIN SERPL-MCNC: 4.1 G/DL (ref 3.5–5.2)
ALBUMIN/GLOB SERPL: 1.8 G/DL
ALP SERPL-CCNC: 55 U/L (ref 39–117)
ALT SERPL W P-5'-P-CCNC: 15 U/L (ref 1–33)
ANION GAP SERPL CALCULATED.3IONS-SCNC: 12.9 MMOL/L (ref 5–15)
AST SERPL-CCNC: 19 U/L (ref 1–32)
BASOPHILS # BLD AUTO: 0.01 10*3/MM3 (ref 0–0.2)
BASOPHILS NFR BLD AUTO: 0.2 % (ref 0–1.5)
BILIRUB SERPL-MCNC: 0.5 MG/DL (ref 0.2–1.2)
BUN BLD-MCNC: 11 MG/DL (ref 6–20)
BUN/CREAT SERPL: 15.5 (ref 7–25)
CALCIUM SPEC-SCNC: 9.4 MG/DL (ref 8.6–10.5)
CHLORIDE SERPL-SCNC: 101 MMOL/L (ref 98–107)
CHOLEST SERPL-MCNC: 177 MG/DL (ref 0–200)
CHROMATIN AB SERPL-ACNC: <10 IU/ML (ref 0–14)
CO2 SERPL-SCNC: 25.1 MMOL/L (ref 22–29)
CREAT BLD-MCNC: 0.71 MG/DL (ref 0.57–1)
CRP SERPL-MCNC: 0.99 MG/DL (ref 0–0.5)
DEPRECATED RDW RBC AUTO: 46.8 FL (ref 37–54)
EOSINOPHIL # BLD AUTO: 0.15 10*3/MM3 (ref 0–0.4)
EOSINOPHIL NFR BLD AUTO: 3 % (ref 0.3–6.2)
ERYTHROCYTE [DISTWIDTH] IN BLOOD BY AUTOMATED COUNT: 14.6 % (ref 12.3–15.4)
GFR SERPL CREATININE-BSD FRML MDRD: 88 ML/MIN/1.73
GLOBULIN UR ELPH-MCNC: 2.3 GM/DL
GLUCOSE BLD-MCNC: 105 MG/DL (ref 65–99)
HCT VFR BLD AUTO: 34.6 % (ref 34–46.6)
HDLC SERPL-MCNC: 43 MG/DL (ref 40–60)
HGB BLD-MCNC: 11.4 G/DL (ref 12–15.9)
IMM GRANULOCYTES # BLD AUTO: 0.01 10*3/MM3 (ref 0–0.05)
IMM GRANULOCYTES NFR BLD AUTO: 0.2 % (ref 0–0.5)
LDLC SERPL CALC-MCNC: 103 MG/DL (ref 0–100)
LDLC/HDLC SERPL: 2.4 {RATIO}
LYMPHOCYTES # BLD AUTO: 1.4 10*3/MM3 (ref 0.7–3.1)
LYMPHOCYTES NFR BLD AUTO: 27.8 % (ref 19.6–45.3)
MCH RBC QN AUTO: 28.7 PG (ref 26.6–33)
MCHC RBC AUTO-ENTMCNC: 32.9 G/DL (ref 31.5–35.7)
MCV RBC AUTO: 87.2 FL (ref 79–97)
MONOCYTES # BLD AUTO: 0.37 10*3/MM3 (ref 0.1–0.9)
MONOCYTES NFR BLD AUTO: 7.3 % (ref 5–12)
NEUTROPHILS # BLD AUTO: 3.1 10*3/MM3 (ref 1.7–7)
NEUTROPHILS NFR BLD AUTO: 61.5 % (ref 42.7–76)
NRBC BLD AUTO-RTO: 0 /100 WBC (ref 0–0.2)
PLATELET # BLD AUTO: 261 10*3/MM3 (ref 140–450)
PMV BLD AUTO: 9.7 FL (ref 6–12)
POTASSIUM BLD-SCNC: 4.3 MMOL/L (ref 3.5–5.2)
PROT SERPL-MCNC: 6.4 G/DL (ref 6–8.5)
RBC # BLD AUTO: 3.97 10*6/MM3 (ref 3.77–5.28)
SODIUM BLD-SCNC: 139 MMOL/L (ref 136–145)
TRIGL SERPL-MCNC: 153 MG/DL (ref 0–150)
VIT B12 BLD-MCNC: 920 PG/ML (ref 211–946)
VLDLC SERPL-MCNC: 30.6 MG/DL (ref 5–40)
WBC NRBC COR # BLD: 5.04 10*3/MM3 (ref 3.4–10.8)

## 2020-04-03 PROCEDURE — 86140 C-REACTIVE PROTEIN: CPT | Performed by: NURSE PRACTITIONER

## 2020-04-03 PROCEDURE — 86235 NUCLEAR ANTIGEN ANTIBODY: CPT | Performed by: NURSE PRACTITIONER

## 2020-04-03 PROCEDURE — 82607 VITAMIN B-12: CPT | Performed by: NURSE PRACTITIONER

## 2020-04-03 PROCEDURE — 86038 ANTINUCLEAR ANTIBODIES: CPT | Performed by: NURSE PRACTITIONER

## 2020-04-03 PROCEDURE — 80061 LIPID PANEL: CPT | Performed by: NURSE PRACTITIONER

## 2020-04-03 PROCEDURE — 80053 COMPREHEN METABOLIC PANEL: CPT | Performed by: NURSE PRACTITIONER

## 2020-04-03 PROCEDURE — 82306 VITAMIN D 25 HYDROXY: CPT | Performed by: NURSE PRACTITIONER

## 2020-04-03 PROCEDURE — 36415 COLL VENOUS BLD VENIPUNCTURE: CPT

## 2020-04-03 PROCEDURE — 83516 IMMUNOASSAY NONANTIBODY: CPT | Performed by: NURSE PRACTITIONER

## 2020-04-03 PROCEDURE — 86431 RHEUMATOID FACTOR QUANT: CPT | Performed by: NURSE PRACTITIONER

## 2020-04-03 PROCEDURE — 84425 ASSAY OF VITAMIN B-1: CPT | Performed by: NURSE PRACTITIONER

## 2020-04-03 PROCEDURE — 85025 COMPLETE CBC W/AUTO DIFF WBC: CPT | Performed by: NURSE PRACTITIONER

## 2020-04-03 PROCEDURE — 86225 DNA ANTIBODY NATIVE: CPT | Performed by: NURSE PRACTITIONER

## 2020-04-05 LAB — VIT B1 BLD-SCNC: 140.2 NMOL/L (ref 66.5–200)

## 2020-04-06 LAB — ANA SER QL: POSITIVE

## 2020-04-07 LAB
CENTROMERE B AB SER-ACNC: >8 AI (ref 0–0.9)
CHROMATIN AB SERPL-ACNC: <0.2 AI (ref 0–0.9)
DSDNA AB SER-ACNC: <1 IU/ML (ref 0–9)
ENA JO1 AB SER-ACNC: <0.2 AI (ref 0–0.9)
ENA RNP AB SER-ACNC: <0.2 AI (ref 0–0.9)
ENA SCL70 AB SER-ACNC: <0.2 AI (ref 0–0.9)
ENA SM AB SER-ACNC: <0.2 AI (ref 0–0.9)
ENA SS-A AB SER-ACNC: <0.2 AI (ref 0–0.9)
ENA SS-B AB SER-ACNC: <0.2 AI (ref 0–0.9)
Lab: ABNORMAL
RIBOSOMAL P AB SER-ACNC: <0.2 AI (ref 0–0.9)
RIBOSOMAL P AB SER-ACNC: <0.2 AI (ref 0–0.9)

## 2020-04-08 DIAGNOSIS — R79.89 LOW VITAMIN D LEVEL: Primary | ICD-10-CM

## 2020-04-08 DIAGNOSIS — R20.9 SENSATION OF COLD IN LOWER EXTREMITY: ICD-10-CM

## 2020-04-08 DIAGNOSIS — R20.0 NUMBNESS AND TINGLING: ICD-10-CM

## 2020-04-08 DIAGNOSIS — R76.8 POSITIVE ANA (ANTINUCLEAR ANTIBODY): ICD-10-CM

## 2020-04-08 DIAGNOSIS — R20.2 NUMBNESS AND TINGLING: ICD-10-CM

## 2020-04-08 RX ORDER — ERGOCALCIFEROL 1.25 MG/1
50000 CAPSULE ORAL WEEKLY
Qty: 12 CAPSULE | Refills: 0 | Status: SHIPPED | OUTPATIENT
Start: 2020-04-08 | End: 2020-06-25

## 2020-04-24 ENCOUNTER — TELEPHONE (OUTPATIENT)
Dept: BARIATRICS/WEIGHT MGMT | Facility: CLINIC | Age: 45
End: 2020-04-24

## 2020-04-29 DIAGNOSIS — M25.542 ARTHRALGIA OF BOTH HANDS: ICD-10-CM

## 2020-04-29 DIAGNOSIS — R20.0 NUMBNESS AND TINGLING: ICD-10-CM

## 2020-04-29 DIAGNOSIS — R20.2 NUMBNESS AND TINGLING: ICD-10-CM

## 2020-04-29 DIAGNOSIS — M25.541 ARTHRALGIA OF BOTH HANDS: ICD-10-CM

## 2020-04-29 DIAGNOSIS — R20.9 SENSATION OF COLD IN LOWER EXTREMITY: ICD-10-CM

## 2020-04-29 RX ORDER — MELOXICAM 15 MG/1
TABLET ORAL
Qty: 30 TABLET | Refills: 0 | Status: SHIPPED | OUTPATIENT
Start: 2020-04-29 | End: 2020-05-25

## 2020-04-29 RX ORDER — BUPROPION HYDROCHLORIDE 150 MG/1
150 TABLET, EXTENDED RELEASE ORAL 2 TIMES DAILY
Qty: 180 TABLET | Refills: 1 | Status: SHIPPED | OUTPATIENT
Start: 2020-04-29 | End: 2022-09-19

## 2020-04-29 RX ORDER — ROSUVASTATIN CALCIUM 10 MG/1
10 TABLET, COATED ORAL
Qty: 90 TABLET | Refills: 1 | Status: SHIPPED | OUTPATIENT
Start: 2020-04-29 | End: 2022-09-19

## 2020-05-01 ENCOUNTER — OFFICE VISIT (OUTPATIENT)
Dept: FAMILY MEDICINE CLINIC | Facility: CLINIC | Age: 45
End: 2020-05-01

## 2020-05-01 VITALS
WEIGHT: 283 LBS | BODY MASS INDEX: 52.08 KG/M2 | TEMPERATURE: 98.2 F | SYSTOLIC BLOOD PRESSURE: 121 MMHG | DIASTOLIC BLOOD PRESSURE: 79 MMHG | OXYGEN SATURATION: 98 % | HEART RATE: 95 BPM | HEIGHT: 62 IN

## 2020-05-01 DIAGNOSIS — R20.9 SENSATION OF COLD IN LOWER EXTREMITY: ICD-10-CM

## 2020-05-01 DIAGNOSIS — M25.541 ARTHRALGIA OF BOTH HANDS: ICD-10-CM

## 2020-05-01 DIAGNOSIS — M25.542 ARTHRALGIA OF BOTH HANDS: ICD-10-CM

## 2020-05-01 DIAGNOSIS — R20.0 NUMBNESS AND TINGLING: Primary | ICD-10-CM

## 2020-05-01 DIAGNOSIS — R20.2 NUMBNESS AND TINGLING: Primary | ICD-10-CM

## 2020-05-01 PROCEDURE — 99213 OFFICE O/P EST LOW 20 MIN: CPT | Performed by: NURSE PRACTITIONER

## 2020-05-01 NOTE — PROGRESS NOTES
Answers for HPI/ROS submitted by the patient on 4/29/2020   What is the primary reason for your visit?: Other  Please describe your symptoms.: This is a follow-up appointment from my feet. They feel frostbit all the time. They hurt in my toes.  Have you had these symptoms before?: Yes  How long have you been having these symptoms?: OtherSubjective   Grace Clement is a 45 y.o. female.     Patient is here today for one-month follow-up on upper and lower extremity numbness and tingling in bilateral hand pain and fatigue.    Numbness and tingling- patient had EMG and ABIs completed.  Both of which came back normal.  She had extensive blood work which showed a positive PHILIPPE.  Patient was referred to rheumatology, which she saw yesterday (Dr. Armenta).  He added additional blood work.  He does not believe that she has an autoimmune disorder.  She has continued to have numbness and tingling in bilateral upper and lower extremities. The numbness and cool sensation is only in the toes.       Hand pain- patient was referred to [facility] for possible carpal tunnel. She has not seen them. She wears braces when she sleep.  She was started on meloxicam.  She is not having as much pain in her hands.    Fatigue- on previous blood work patient was found to have a low vitamin D.  She was started on supplement.  Reports that she is still experiencing fatigue.  She falls asleep easily.  She has been struggling with some depression.  She is still on Wellbutrin .  She has been out of it for some time.  She would like to restart it and see if it helps her. Denies SI or HI.       The following portions of the patient's history were reviewed and updated as appropriate: allergies, current medications, past family history, past medical history, past social history, past surgical history and problem list.    Review of Systems   Constitutional: Positive for fatigue. Negative for chills and fever.   Respiratory: Negative for chest  "tightness and shortness of breath.    Cardiovascular: Negative for chest pain and palpitations.   Musculoskeletal: Positive for arthralgias.   Skin: Negative for color change.        Cool sensation in feet   Neurological: Positive for numbness. Negative for dizziness and headache.   Psychiatric/Behavioral: Positive for depressed mood and stress. Negative for sleep disturbance and suicidal ideas.       Objective   /79 (BP Location: Left arm, Patient Position: Sitting, Cuff Size: Large Adult)   Pulse 95   Temp 98.2 °F (36.8 °C) (Oral)   Ht 157.5 cm (62\")   Wt 128 kg (283 lb)   SpO2 98%   Breastfeeding No   BMI 51.76 kg/m²   Physical Exam   Constitutional: She is oriented to person, place, and time. She appears well-developed and well-nourished. No distress.   HENT:   Head: Normocephalic and atraumatic.   Eyes: EOM are normal.   Cardiovascular: Normal rate, regular rhythm and normal heart sounds.   No murmur heard.  Pulmonary/Chest: Effort normal and breath sounds normal. No respiratory distress.   Abdominal: Soft. Bowel sounds are normal. There is no tenderness.   Musculoskeletal: She exhibits no edema.   Neurological: She is alert and oriented to person, place, and time.   Skin: Skin is warm.   Psychiatric: She has a normal mood and affect. Her behavior is normal. Judgment and thought content normal.         Assessment/Plan     Diagnoses and all orders for this visit:    1. Numbness and tingling (Primary)  Comments:  continued  Meloxicam helped with hands- see [facility] if cont  reviewed EMG and EUGENIA BLE- normal  will refer to neurology for further eval  Orders:  -     Ambulatory Referral to Neurology    2. Sensation of cold in lower extremity  Comments:  unknown etiology  labs and EUGENIA normal  refer to neurology  Orders:  -     Ambulatory Referral to Neurology    3. Arthralgia of both hands  Comments:  improved with meloxicam  sees Rheumatology- getting workup  cont med                "

## 2020-05-01 NOTE — PATIENT INSTRUCTIONS
Call [facility] to follow up on hand pain.  Referral to Dr. Seipel with neurology  Cont current meds  Call for any issues or concerns  Call bariatric surgery [phone]

## 2020-05-24 DIAGNOSIS — M25.541 ARTHRALGIA OF BOTH HANDS: ICD-10-CM

## 2020-05-24 DIAGNOSIS — M25.542 ARTHRALGIA OF BOTH HANDS: ICD-10-CM

## 2020-05-24 DIAGNOSIS — R20.0 NUMBNESS AND TINGLING: ICD-10-CM

## 2020-05-24 DIAGNOSIS — R20.2 NUMBNESS AND TINGLING: ICD-10-CM

## 2020-05-24 DIAGNOSIS — R20.9 SENSATION OF COLD IN LOWER EXTREMITY: ICD-10-CM

## 2020-05-25 RX ORDER — MELOXICAM 15 MG/1
TABLET ORAL
Qty: 30 TABLET | Refills: 0 | Status: SHIPPED | OUTPATIENT
Start: 2020-05-25 | End: 2022-09-19

## 2020-06-25 DIAGNOSIS — R79.89 LOW VITAMIN D LEVEL: ICD-10-CM

## 2020-06-25 RX ORDER — ERGOCALCIFEROL 1.25 MG/1
CAPSULE ORAL
Qty: 12 CAPSULE | Refills: 0 | OUTPATIENT
Start: 2020-06-25

## 2021-05-06 DIAGNOSIS — J44.9 CHRONIC OBSTRUCTIVE PULMONARY DISEASE, UNSPECIFIED COPD TYPE (HCC): ICD-10-CM

## 2021-05-06 RX ORDER — ALBUTEROL SULFATE 90 UG/1
AEROSOL, METERED RESPIRATORY (INHALATION)
Qty: 6.7 G | Refills: 2 | Status: SHIPPED | OUTPATIENT
Start: 2021-05-06 | End: 2022-09-19

## 2022-09-19 ENCOUNTER — OFFICE VISIT (OUTPATIENT)
Dept: FAMILY MEDICINE CLINIC | Facility: CLINIC | Age: 47
End: 2022-09-19

## 2022-09-19 VITALS
OXYGEN SATURATION: 99 % | DIASTOLIC BLOOD PRESSURE: 79 MMHG | TEMPERATURE: 98.2 F | WEIGHT: 293 LBS | BODY MASS INDEX: 56.33 KG/M2 | HEART RATE: 80 BPM | SYSTOLIC BLOOD PRESSURE: 138 MMHG

## 2022-09-19 DIAGNOSIS — J44.9 CHRONIC OBSTRUCTIVE PULMONARY DISEASE, UNSPECIFIED COPD TYPE: ICD-10-CM

## 2022-09-19 DIAGNOSIS — G89.29 CHRONIC PAIN OF RIGHT KNEE: ICD-10-CM

## 2022-09-19 DIAGNOSIS — R63.5 WEIGHT GAIN: ICD-10-CM

## 2022-09-19 DIAGNOSIS — M79.641 PAIN IN BOTH HANDS: Primary | ICD-10-CM

## 2022-09-19 DIAGNOSIS — R76.8 POSITIVE ANA (ANTINUCLEAR ANTIBODY): ICD-10-CM

## 2022-09-19 DIAGNOSIS — M79.642 PAIN IN BOTH HANDS: Primary | ICD-10-CM

## 2022-09-19 DIAGNOSIS — M25.561 CHRONIC PAIN OF RIGHT KNEE: ICD-10-CM

## 2022-09-19 PROCEDURE — 99214 OFFICE O/P EST MOD 30 MIN: CPT | Performed by: NURSE PRACTITIONER

## 2022-09-19 RX ORDER — ATORVASTATIN CALCIUM 20 MG/1
20 TABLET, FILM COATED ORAL DAILY
Qty: 90 TABLET | Refills: 1 | Status: SHIPPED | OUTPATIENT
Start: 2022-09-19 | End: 2023-03-27

## 2022-09-19 RX ORDER — MONTELUKAST SODIUM 10 MG/1
10 TABLET ORAL NIGHTLY
Qty: 90 TABLET | Refills: 1 | Status: SHIPPED | OUTPATIENT
Start: 2022-09-19 | End: 2023-03-27

## 2022-09-19 RX ORDER — ALBUTEROL SULFATE 90 UG/1
2 AEROSOL, METERED RESPIRATORY (INHALATION) AS NEEDED
COMMUNITY
Start: 2022-04-20 | End: 2022-12-02

## 2022-09-19 RX ORDER — MELOXICAM 15 MG/1
15 TABLET ORAL DAILY
Qty: 30 TABLET | Refills: 0 | Status: SHIPPED | OUTPATIENT
Start: 2022-09-19 | End: 2022-10-28 | Stop reason: SDUPTHER

## 2022-09-19 RX ORDER — GABAPENTIN 100 MG/1
CAPSULE ORAL AS NEEDED
COMMUNITY
Start: 2022-08-29 | End: 2022-10-26 | Stop reason: SDUPTHER

## 2022-09-19 RX ORDER — ATORVASTATIN CALCIUM 20 MG/1
20 TABLET, FILM COATED ORAL DAILY
COMMUNITY
Start: 2022-05-19 | End: 2022-09-19 | Stop reason: SDUPTHER

## 2022-09-19 RX ORDER — BUDESONIDE, GLYCOPYRROLATE, AND FORMOTEROL FUMARATE 160; 9; 4.8 UG/1; UG/1; UG/1
AEROSOL, METERED RESPIRATORY (INHALATION)
COMMUNITY
Start: 2022-08-28

## 2022-09-19 NOTE — PROGRESS NOTES
Subjective  Answers for HPI/ROS submitted by the patient on 9/12/2022  Please describe your symptoms.: I'm having pain in my elbows and hands, and questions about Menopause.  Have you had these symptoms before?: Yes  How long have you been having these symptoms?: Greater than 2 weeks  Please list any medications you are currently taking for this condition.: Ibuprofen and Voltaren arthritis cream.  What is the primary reason for your visit?: Other        Grace Clement is a 47 y.o. female.     History of Present Illness  Patient is here today with complaints of bilateral hand and elbow pain.  She is also having right knee pain.  Pt is going to be seeing a podiatrist to figure out why she is having pain in the feet as well  She will be seeing a spine doctor in October.  She had a positive PHILIPPE in the past but never saw rheumatology  She has had some weight gain and is having trouble losing weight.   She has tried weight watchers and is trying to eat smaller portions  She is trying to inc her activity level.     Hand/elbow pain- this is a chronic issue. She states she has pain when she straightens her arms. She states her hands fall asleep often. She has chronic low back pain. Pain is constant in the elbows. She states the elbow pain started about 1 mo ago.     Right knee pain- she has been having pain off and on for 4 years. It often wakes her up.       The following portions of the patient's history were reviewed and updated as appropriate: allergies, current medications, past family history, past medical history, past social history, past surgical history and problem list.    Review of Systems   Constitutional: Positive for fatigue. Negative for chills and fever.   Respiratory: Positive for shortness of breath. Negative for chest tightness.    Cardiovascular: Negative for chest pain and palpitations.   Musculoskeletal: Positive for arthralgias.   Neurological: Positive for numbness. Negative for dizziness and  headache.       Objective     /79 (BP Location: Left arm, Patient Position: Sitting, Cuff Size: Adult)   Pulse 80   Temp 98.2 °F (36.8 °C) (Tympanic)   Wt (!) 140 kg (308 lb)   SpO2 99%   BMI 56.33 kg/m²     Current Outpatient Medications on File Prior to Visit   Medication Sig Dispense Refill   • albuterol sulfate  (90 Base) MCG/ACT inhaler Inhale 2 puffs As Needed.     • Breztri Aerosphere 160-9-4.8 MCG/ACT aerosol inhaler      • gabapentin (NEURONTIN) 100 MG capsule As Needed.     • [DISCONTINUED] atorvastatin (LIPITOR) 20 MG tablet Take 20 mg by mouth Daily.     • albuterol (ACCUNEB) 0.63 MG/3ML nebulizer solution Take 3 mL by nebulization Every 6 (Six) Hours As Needed for Wheezing. 360 mL 3   • aspirin 81 MG tablet Take 81 mg by mouth Daily.     • fexofenadine (ALLEGRA) 60 MG tablet MUCINEX ALLERGY TABLET     • [DISCONTINUED] albuterol sulfate  (90 Base) MCG/ACT inhaler TAKE 2 PUFFS BY MOUTH EVERY 6 HOURS AS NEEDED FOR WHEEZE 6.7 g 2   • [DISCONTINUED] buPROPion SR (WELLBUTRIN SR) 150 MG 12 hr tablet Take 1 tablet by mouth 2 (Two) Times a Day. 180 tablet 1   • [DISCONTINUED] ferrous sulfate 325 (65 FE) MG tablet Every 12 (Twelve) Hours.     • [DISCONTINUED] meloxicam (MOBIC) 15 MG tablet TAKE 1 TABLET BY MOUTH EVERY DAY 30 tablet 0   • [DISCONTINUED] montelukast (SINGULAIR) 10 MG tablet Take 1 tablet by mouth Every Night. 90 tablet 1   • [DISCONTINUED] rosuvastatin (CRESTOR) 10 MG tablet Take 1 tablet by mouth every night at bedtime. 90 tablet 1   • [DISCONTINUED] SYMBICORT 160-4.5 MCG/ACT inhaler Inhale 2 puffs 2 (Two) Times a Day. 1 inhaler 2   • [DISCONTINUED] theophylline (THEODUR) 300 MG 12 hr tablet Take 300 mg by mouth Daily.  11   • [DISCONTINUED] tiotropium (SPIRIVA HANDIHALER) 18 MCG per inhalation capsule Place 1 capsule into inhaler and inhale Daily. 30 capsule 6     No current facility-administered medications on file prior to visit.        Physical Exam  Vitals reviewed.    Constitutional:       General: She is not in acute distress.     Appearance: Normal appearance. She is well-developed. She is not diaphoretic.   HENT:      Head: Normocephalic and atraumatic.   Eyes:      General:         Right eye: No discharge.         Left eye: No discharge.      Extraocular Movements: Extraocular movements intact.      Conjunctiva/sclera: Conjunctivae normal.   Cardiovascular:      Rate and Rhythm: Normal rate and regular rhythm.   Pulmonary:      Effort: Pulmonary effort is normal. No respiratory distress.      Breath sounds: Normal breath sounds. No wheezing or rales.   Abdominal:      General: Bowel sounds are normal.      Palpations: Abdomen is soft.   Musculoskeletal:         General: Normal range of motion.      Cervical back: Normal range of motion.   Skin:     General: Skin is warm and dry.   Neurological:      General: No focal deficit present.      Mental Status: She is alert and oriented to person, place, and time.   Psychiatric:         Mood and Affect: Mood normal.         Behavior: Behavior normal.         Thought Content: Thought content normal.         Judgment: Judgment normal.           Assessment & Plan     Diagnoses and all orders for this visit:    1. Pain in both hands (Primary)  Comments:  possibly arthritis  hx of positive PHILIPPE  referral to rheum  start mobic  f/u 1 mo  cr stable  Orders:  -     meloxicam (Mobic) 15 MG tablet; Take 1 tablet by mouth Daily.  Dispense: 30 tablet; Refill: 0    2. Chronic obstructive pulmonary disease, unspecified COPD type (HCC)  Comments:  cont meds  Orders:  -     montelukast (Singulair) 10 MG tablet; Take 1 tablet by mouth Every Night.  Dispense: 90 tablet; Refill: 1    3. Positive PHILIPPE (antinuclear antibody)  Comments:  referral to rheum  Orders:  -     Ambulatory Referral to Rheumatology    4. Chronic pain of right knee  Comments:  likely arthritis  doesnt want to see ortho yet  start mobic  f/u 1 mo      5. Weight gain  Comments:  try  to increase exercise  intuitive eating  may try weekly injectable- contact insurance for coverage    Other orders  -     atorvastatin (LIPITOR) 20 MG tablet; Take 1 tablet by mouth Daily.  Dispense: 90 tablet; Refill: 1

## 2022-09-19 NOTE — PATIENT INSTRUCTIONS
Ask your insurance about wegovy, ozempic, and mounjaro for weight loss- they are weekly injectables.   Work on portion size and water intake  Start meloxicam daily for arthritic pain  Referral to rheumatology.

## 2022-10-25 NOTE — PROGRESS NOTES
"Subjective:  Neuropathy ? Small fiber     Patient ID: Grace Clement is a 47 y.o. female.    CHIEF COMPLAINT: neuropathy    History of Present Illness: Grace Clement is a 47-year-old  female with a BMI of 54.13 who presented alone for an evaluation of neuropathic symptoms referred by Jaye JHAVERI as a new patient.  The patient reports in 2020 she started having tingling and burning in both feet.  She states that it started out intermittently and now is on a daily basis.  She has had a normal EMG study and a normal Doppler of both legs but still states this pain is a burning tingling neuropathic type pain.  She states under the \"balls\" of her feet she has stronger paresthesia and has trouble walking at times but has not had any falls.  She states the discomfort is worse at night and will keep her from falling asleep or wake her up.  She has not had any neuropathic labs completed to date.  She has been taking gabapentin at a very low dose at 100 mg which helps her be able to fall asleep but does not help with the discomfort.  She states she does have a history of sleep apnea and currently is using a CPAP every night.      Complaint: Bilateral foot tingling/burning    Onset: 2020 after illness   Location: Bilateral feet   Quality:tingling and burning   Severity: 4/10  Will flair   Duration: 24X7    Frequency:Daily   Timing: worse at night   Context:on feet too long   Modifying factors: sitting down   Associated Signs and symptoms:  Trouble walking   Current meds: Gabapentin 100           Testing  EMG  History: This patient complains of pain /cold feeling in feet and occasional sharp pains.  Results:  1.  The skin Temp was 91F   2.  The bilateral sural sensory stet were normal.  3.  The left peroneal motor study was normal  4.  The bilateral tibial motor studies were essentially normal .  The amplitude of the CMAP with proximal stimulation were low but this is felt likely due to technical factors " associated with obesity.   5  EMG of the muscles tested in the bilateral L4-S1 myotomes were normal.    Impression:   This is a normal study of the lower extremities.     Joseph Seipel, M.D.       Ultrasound of ankle brachial index bilateral.  Completed on 4/20/2020  Impression:  1.  Bilateral ABIs of 1.4 are considered within normal limits.  2.  No definite sonographic evidence of hemodynamically significant arterial  occlusive disease in the lower extremities at this time.  Electronically signed by Alvaro Jones MD          The following portions of the patient's history were reviewed and updated as appropriate: allergies, current medications, past family history, past medical history, past social history, past surgical history and problem list.      Family History   Problem Relation Age of Onset   • Alcohol abuse Mother    • COPD Mother    • Depression Mother    • Vision loss Mother    • Early death Mother    • Alcohol abuse Father    • Early death Father    • Alcohol abuse Sister    • Depression Sister    • Miscarriages / Stillbirths Sister    • Learning disabilities Sister    • Mental illness Sister    • Alcohol abuse Brother    • COPD Brother    • Hyperlipidemia Brother    • Drug abuse Brother    • Early death Brother    • Anxiety disorder Daughter    • Asthma Daughter    • Depression Daughter    • Drug abuse Daughter    • Learning disabilities Daughter    • Mental illness Daughter    • Arthritis Brother    • Drug abuse Brother    • Cancer Maternal Uncle    • Early death Maternal Uncle    • Heart disease Maternal Grandmother    • Early death Maternal Grandmother    • Miscarriages / Stillbirths Sister        Past Medical History:   Diagnosis Date   • Allergic 1994   • Asthma    • COPD (chronic obstructive pulmonary disease) (HCC)    • Hyperlipidemia    • Vitamin D deficiency        Social History     Socioeconomic History   • Marital status:    Tobacco Use   • Smoking status: Former     Packs/day: 1.50      Years: 30.00     Pack years: 45.00     Types: Cigarettes     Start date: 3/10/1984     Quit date: 4/27/2019     Years since quitting: 3.5   • Smokeless tobacco: Never   Substance and Sexual Activity   • Alcohol use: No   • Drug use: No   • Sexual activity: Not Currently     Partners: Male     Birth control/protection: Post-menopausal         Current Outpatient Medications:   •  albuterol (ACCUNEB) 0.63 MG/3ML nebulizer solution, Take 3 mL by nebulization Every 6 (Six) Hours As Needed for Wheezing., Disp: 360 mL, Rfl: 3  •  aspirin 81 MG tablet, Take 81 mg by mouth Daily., Disp: , Rfl:   •  atorvastatin (LIPITOR) 20 MG tablet, Take 1 tablet by mouth Daily., Disp: 90 tablet, Rfl: 1  •  Breztri Aerosphere 160-9-4.8 MCG/ACT aerosol inhaler, , Disp: , Rfl:   •  fexofenadine (ALLEGRA) 60 MG tablet, MUCINEX ALLERGY TABLET, Disp: , Rfl:   •  fluticasone (FLONASE) 50 MCG/ACT nasal spray, 2 sprays into the nostril(s) as directed by provider Daily., Disp: , Rfl:   •  gabapentin (NEURONTIN) 300 MG capsule, Wk1: take 1 tab at bedtime, Wk2: take 1 pill twice a day, Wk3 1 tab tree times a day, Disp: 90 capsule, Rfl: 5  •  meloxicam (Mobic) 15 MG tablet, Take 1 tablet by mouth Daily., Disp: 30 tablet, Rfl: 0  •  montelukast (Singulair) 10 MG tablet, Take 1 tablet by mouth Every Night., Disp: 90 tablet, Rfl: 1  •  vitamin D3 125 MCG (5000 UT) capsule capsule, Take 1 capsule by mouth Daily., Disp: , Rfl:   •  albuterol sulfate  (90 Base) MCG/ACT inhaler, Inhale 2 puffs As Needed., Disp: , Rfl:     Review of Systems   Constitutional: Positive for fatigue.   HENT: Positive for dental problem.    Eyes: Positive for itching.   Respiratory: Positive for apnea and shortness of breath.    Cardiovascular: Negative.    Gastrointestinal: Negative.    Endocrine: Negative.    Genitourinary: Negative.    Musculoskeletal: Positive for back pain.   Allergic/Immunologic: Positive for environmental allergies.   Neurological: Negative.     Hematological: Negative.    Psychiatric/Behavioral: Negative.         I have reviewed ROS completed by medical assistant.     Objective:    Neurologic Exam     Mental Status   Speech: speech is normal     Cranial Nerves   Cranial nerves II through XII intact.     CN III, IV, VI   Pupils are equal, round, and reactive to light.    Gait, Coordination, and Reflexes     Coordination   Finger to nose coordination: normal  Tandem walking coordination: abnormal (Could walk heels and toes , No Tandem )    Reflexes   Right brachioradialis: 2+  Left brachioradialis: 2+  Right biceps: 1+  Left biceps: 1+  Right triceps: 1+  Left triceps: 1+  Right patellar: 2+  Left patellar: 2+  Right achilles: 1+  Left achilles: 1+      Physical Exam  Vitals and nursing note reviewed.   Constitutional:       Appearance: Normal appearance. She is well-developed and normal weight.   HENT:      Head: Normocephalic and atraumatic.      Right Ear: Hearing normal.      Left Ear: Hearing normal.      Nose: Nose normal.      Mouth/Throat:      Lips: Pink.      Mouth: Mucous membranes are moist.   Eyes:      General: Lids are normal. No visual field deficit.     Extraocular Movements: Extraocular movements intact.      Pupils: Pupils are equal, round, and reactive to light.   Cardiovascular:      Rate and Rhythm: Normal rate and regular rhythm.      Pulses: Normal pulses.      Heart sounds: Normal heart sounds, S1 normal and S2 normal.   Pulmonary:      Effort: Pulmonary effort is normal.      Breath sounds: Normal breath sounds and air entry.   Musculoskeletal:         General: Normal range of motion.      Cervical back: Full passive range of motion without pain and normal range of motion.      Comments: 5/5 all extremities including bilateral , plantar and dorsiflexion of feet    Skin:     General: Skin is warm and dry.   Neurological:      Mental Status: She is alert.      Cranial Nerves: Cranial nerves 2-12 are intact. No cranial nerve  deficit, dysarthria or facial asymmetry.      Sensory: Sensory deficit (Bilateral feet: Plantar surfaces and all toes) present.      Motor: Motor function is intact. No weakness, tremor, atrophy, abnormal muscle tone, seizure activity or pronator drift.      Coordination: Romberg sign negative. Coordination normal. Heel to Shin Test abnormal (Due to body Habitus). Finger-Nose-Finger Test normal. Rapid alternating movements normal.      Gait: Tandem walk abnormal (Could walk heels and toes , No Tandem ). Gait normal.      Deep Tendon Reflexes: Babinski sign absent on the right side. Babinski sign absent on the left side.      Reflex Scores:       Tricep reflexes are 1+ on the right side and 1+ on the left side.       Bicep reflexes are 1+ on the right side and 1+ on the left side.       Brachioradialis reflexes are 2+ on the right side and 2+ on the left side.       Patellar reflexes are 2+ on the right side and 2+ on the left side.       Achilles reflexes are 1+ on the right side and 1+ on the left side.  Psychiatric:         Attention and Perception: Attention normal.         Mood and Affect: Mood normal.         Speech: Speech normal.         Behavior: Behavior normal. Behavior is cooperative.         Assessment/Plan:  Discussion: The patient may have a small fiber neuropathy that was not exhibited on the EMG study.  As she still is having significant discomfort I will go ahead and titrate up the gabapentin to 300 mg at bedtime for 1 week, twice a day the second week, 3 times a day if needed on the third week.   She agreed to undergoing neuropathic lab test and those were ordered.  She denies any falls or balance problems.  She will be scheduled back for a follow-up in 3 to 4 months and is to call if the gabapentin does not control her pain.     Diagnoses and all orders for this visit:    1. Neuropathy (Primary)  -     gabapentin (NEURONTIN) 300 MG capsule; Wk1: take 1 tab at bedtime, Wk2: take 1 pill twice a day,  Wk3 1 tab tree times a day  Dispense: 90 capsule; Refill: 5  -     PHILIPPE  -     Calcium  -     CBC & Differential  -     Celiac Disease Antibody Screen  -     Complement, Total  -     Comprehensive Metabolic Panel  -     Copper, Serum  -     C-reactive Protein  -     Cryoglobulin  -     Ferritin  -     Folate  -     Heavy Metals, Blood  -     Hemoglobin A1c  -     Hepatitis B Surface Antigen  -     Hepatitis C Antibody  -     Homocysteine  -     Lipid Panel  -     Methylmalonic Acid, Serum  -     Phosphorus  -     Protein Elec + Interp, Serum  -     Rheumatoid Factor  -     RPR  -     Sedimentation Rate  -     TSH Rfx On Abnormal To Free T4  -     Vitamin B1, Whole Blood  -     Vitamin B12  -     Vitamin B6  -     Vitamin E        Return in about 4 months (around 2/26/2023) for Annabelle Weaver .    I spent 49 minutes caring for Grace on this date of service. This time includes time spent by me in the following activities: reviewing tests, obtaining and/or reviewing a separately obtained history, performing a medically appropriate examination and/or evaluation, counseling and educating the patient/family/caregiver, ordering medications, tests, or procedures and documenting information in the medical record.      This document has been electronically signed by Annabelle CHRISTIANSON on October 26, 2022 12:48 EDT

## 2022-10-26 ENCOUNTER — OFFICE VISIT (OUTPATIENT)
Dept: NEUROLOGY | Facility: CLINIC | Age: 47
End: 2022-10-26

## 2022-10-26 VITALS
WEIGHT: 293 LBS | BODY MASS INDEX: 51.91 KG/M2 | OXYGEN SATURATION: 98 % | SYSTOLIC BLOOD PRESSURE: 128 MMHG | HEART RATE: 83 BPM | TEMPERATURE: 98 F | DIASTOLIC BLOOD PRESSURE: 78 MMHG | HEIGHT: 63 IN

## 2022-10-26 DIAGNOSIS — G62.9 NEUROPATHY: Primary | ICD-10-CM

## 2022-10-26 PROCEDURE — 99215 OFFICE O/P EST HI 40 MIN: CPT | Performed by: NURSE PRACTITIONER

## 2022-10-26 RX ORDER — GABAPENTIN 300 MG/1
CAPSULE ORAL
Qty: 90 CAPSULE | Refills: 5 | Status: SHIPPED | OUTPATIENT
Start: 2022-10-26

## 2022-10-26 RX ORDER — FLUTICASONE PROPIONATE 50 MCG
2 SPRAY, SUSPENSION (ML) NASAL DAILY
COMMUNITY

## 2022-10-27 ENCOUNTER — PATIENT ROUNDING (BHMG ONLY) (OUTPATIENT)
Dept: NEUROLOGY | Facility: CLINIC | Age: 47
End: 2022-10-27

## 2022-10-28 ENCOUNTER — LAB (OUTPATIENT)
Dept: LAB | Facility: HOSPITAL | Age: 47
End: 2022-10-28

## 2022-10-28 ENCOUNTER — TELEPHONE (OUTPATIENT)
Dept: FAMILY MEDICINE CLINIC | Facility: CLINIC | Age: 47
End: 2022-10-28

## 2022-10-28 ENCOUNTER — OFFICE VISIT (OUTPATIENT)
Dept: FAMILY MEDICINE CLINIC | Facility: CLINIC | Age: 47
End: 2022-10-28

## 2022-10-28 VITALS
BODY MASS INDEX: 51.91 KG/M2 | WEIGHT: 293 LBS | SYSTOLIC BLOOD PRESSURE: 137 MMHG | DIASTOLIC BLOOD PRESSURE: 84 MMHG | HEIGHT: 63 IN | TEMPERATURE: 98 F | OXYGEN SATURATION: 99 % | HEART RATE: 77 BPM

## 2022-10-28 DIAGNOSIS — M79.642 PAIN IN BOTH HANDS: ICD-10-CM

## 2022-10-28 DIAGNOSIS — M79.641 PAIN IN BOTH HANDS: ICD-10-CM

## 2022-10-28 DIAGNOSIS — E66.01 MORBID OBESITY WITH BMI OF 50.0-59.9, ADULT: ICD-10-CM

## 2022-10-28 PROBLEM — Z12.11 SCREENING FOR COLON CANCER: Status: ACTIVE | Noted: 2022-05-02

## 2022-10-28 LAB
ALBUMIN SERPL-MCNC: 4.2 G/DL (ref 3.5–5.2)
ALBUMIN/GLOB SERPL: 1.6 G/DL
ALP SERPL-CCNC: 69 U/L (ref 39–117)
ALT SERPL W P-5'-P-CCNC: 14 U/L (ref 1–33)
ANION GAP SERPL CALCULATED.3IONS-SCNC: 8.4 MMOL/L (ref 5–15)
AST SERPL-CCNC: 20 U/L (ref 1–32)
BASOPHILS # BLD AUTO: 0.02 10*3/MM3 (ref 0–0.2)
BASOPHILS NFR BLD AUTO: 0.4 % (ref 0–1.5)
BILIRUB SERPL-MCNC: 0.9 MG/DL (ref 0–1.2)
BUN SERPL-MCNC: 10 MG/DL (ref 6–20)
BUN/CREAT SERPL: 11.5 (ref 7–25)
CALCIUM SPEC-SCNC: 9 MG/DL (ref 8.6–10.5)
CHLORIDE SERPL-SCNC: 101 MMOL/L (ref 98–107)
CHOLEST SERPL-MCNC: 144 MG/DL (ref 0–200)
CHROMATIN AB SERPL-ACNC: 12.1 IU/ML (ref 0–14)
CO2 SERPL-SCNC: 28.6 MMOL/L (ref 22–29)
CREAT SERPL-MCNC: 0.87 MG/DL (ref 0.57–1)
CRP SERPL-MCNC: 0.66 MG/DL (ref 0–0.5)
DEPRECATED RDW RBC AUTO: 46.5 FL (ref 37–54)
EGFRCR SERPLBLD CKD-EPI 2021: 81.8 ML/MIN/1.73
EOSINOPHIL # BLD AUTO: 0.15 10*3/MM3 (ref 0–0.4)
EOSINOPHIL NFR BLD AUTO: 2.9 % (ref 0.3–6.2)
ERYTHROCYTE [DISTWIDTH] IN BLOOD BY AUTOMATED COUNT: 14.9 % (ref 12.3–15.4)
ERYTHROCYTE [SEDIMENTATION RATE] IN BLOOD: 11 MM/HR (ref 0–20)
FERRITIN SERPL-MCNC: 30.7 NG/ML (ref 13–150)
FOLATE SERPL-MCNC: 7.97 NG/ML (ref 4.78–24.2)
GLOBULIN UR ELPH-MCNC: 2.6 GM/DL
GLUCOSE SERPL-MCNC: 80 MG/DL (ref 65–99)
HBA1C MFR BLD: 4.6 % (ref 3.5–5.6)
HBV SURFACE AG SERPL QL IA: NORMAL
HCT VFR BLD AUTO: 30.2 % (ref 34–46.6)
HCV AB SER DONR QL: NORMAL
HCYS SERPL-MCNC: 10.2 UMOL/L (ref 0–15)
HDLC SERPL-MCNC: 33 MG/DL (ref 40–60)
HGB BLD-MCNC: 9.8 G/DL (ref 12–15.9)
IMM GRANULOCYTES # BLD AUTO: 0.02 10*3/MM3 (ref 0–0.05)
IMM GRANULOCYTES NFR BLD AUTO: 0.4 % (ref 0–0.5)
LDLC SERPL CALC-MCNC: 66 MG/DL (ref 0–100)
LDLC/HDLC SERPL: 1.67 {RATIO}
LYMPHOCYTES # BLD AUTO: 1.1 10*3/MM3 (ref 0.7–3.1)
LYMPHOCYTES NFR BLD AUTO: 21.1 % (ref 19.6–45.3)
MCH RBC QN AUTO: 27.6 PG (ref 26.6–33)
MCHC RBC AUTO-ENTMCNC: 32.5 G/DL (ref 31.5–35.7)
MCV RBC AUTO: 85.1 FL (ref 79–97)
MONOCYTES # BLD AUTO: 0.38 10*3/MM3 (ref 0.1–0.9)
MONOCYTES NFR BLD AUTO: 7.3 % (ref 5–12)
NEUTROPHILS NFR BLD AUTO: 3.54 10*3/MM3 (ref 1.7–7)
NEUTROPHILS NFR BLD AUTO: 67.9 % (ref 42.7–76)
NRBC BLD AUTO-RTO: 0 /100 WBC (ref 0–0.2)
PHOSPHATE SERPL-MCNC: 3.3 MG/DL (ref 2.5–4.5)
PLATELET # BLD AUTO: 268 10*3/MM3 (ref 140–450)
PMV BLD AUTO: 10.5 FL (ref 6–12)
POTASSIUM SERPL-SCNC: 3.5 MMOL/L (ref 3.5–5.2)
PROT SERPL-MCNC: 6.8 G/DL (ref 6–8.5)
RBC # BLD AUTO: 3.55 10*6/MM3 (ref 3.77–5.28)
RPR SER QL: NORMAL
SODIUM SERPL-SCNC: 138 MMOL/L (ref 136–145)
TRIGL SERPL-MCNC: 280 MG/DL (ref 0–150)
TSH SERPL DL<=0.05 MIU/L-ACNC: 2.91 UIU/ML (ref 0.27–4.2)
VIT B12 BLD-MCNC: 621 PG/ML (ref 211–946)
VLDLC SERPL-MCNC: 45 MG/DL (ref 5–40)
WBC NRBC COR # BLD: 5.21 10*3/MM3 (ref 3.4–10.8)

## 2022-10-28 PROCEDURE — 86235 NUCLEAR ANTIGEN ANTIBODY: CPT | Performed by: NURSE PRACTITIONER

## 2022-10-28 PROCEDURE — 86140 C-REACTIVE PROTEIN: CPT | Performed by: NURSE PRACTITIONER

## 2022-10-28 PROCEDURE — 80053 COMPREHEN METABOLIC PANEL: CPT | Performed by: NURSE PRACTITIONER

## 2022-10-28 PROCEDURE — 84425 ASSAY OF VITAMIN B-1: CPT | Performed by: NURSE PRACTITIONER

## 2022-10-28 PROCEDURE — 82746 ASSAY OF FOLIC ACID SERUM: CPT | Performed by: NURSE PRACTITIONER

## 2022-10-28 PROCEDURE — 83516 IMMUNOASSAY NONANTIBODY: CPT | Performed by: NURSE PRACTITIONER

## 2022-10-28 PROCEDURE — 84165 PROTEIN E-PHORESIS SERUM: CPT | Performed by: NURSE PRACTITIONER

## 2022-10-28 PROCEDURE — 84446 ASSAY OF VITAMIN E: CPT | Performed by: NURSE PRACTITIONER

## 2022-10-28 PROCEDURE — 86592 SYPHILIS TEST NON-TREP QUAL: CPT | Performed by: NURSE PRACTITIONER

## 2022-10-28 PROCEDURE — 84100 ASSAY OF PHOSPHORUS: CPT | Performed by: NURSE PRACTITIONER

## 2022-10-28 PROCEDURE — 83825 ASSAY OF MERCURY: CPT | Performed by: NURSE PRACTITIONER

## 2022-10-28 PROCEDURE — 82784 ASSAY IGA/IGD/IGG/IGM EACH: CPT | Performed by: NURSE PRACTITIONER

## 2022-10-28 PROCEDURE — 83036 HEMOGLOBIN GLYCOSYLATED A1C: CPT | Performed by: NURSE PRACTITIONER

## 2022-10-28 PROCEDURE — 86431 RHEUMATOID FACTOR QUANT: CPT | Performed by: NURSE PRACTITIONER

## 2022-10-28 PROCEDURE — 36415 COLL VENOUS BLD VENIPUNCTURE: CPT | Performed by: NURSE PRACTITIONER

## 2022-10-28 PROCEDURE — 84443 ASSAY THYROID STIM HORMONE: CPT | Performed by: NURSE PRACTITIONER

## 2022-10-28 PROCEDURE — 86803 HEPATITIS C AB TEST: CPT | Performed by: NURSE PRACTITIONER

## 2022-10-28 PROCEDURE — 83655 ASSAY OF LEAD: CPT | Performed by: NURSE PRACTITIONER

## 2022-10-28 PROCEDURE — 86225 DNA ANTIBODY NATIVE: CPT | Performed by: NURSE PRACTITIONER

## 2022-10-28 PROCEDURE — 82595 ASSAY OF CRYOGLOBULIN: CPT | Performed by: NURSE PRACTITIONER

## 2022-10-28 PROCEDURE — 82525 ASSAY OF COPPER: CPT | Performed by: NURSE PRACTITIONER

## 2022-10-28 PROCEDURE — 87340 HEPATITIS B SURFACE AG IA: CPT | Performed by: NURSE PRACTITIONER

## 2022-10-28 PROCEDURE — 85025 COMPLETE CBC W/AUTO DIFF WBC: CPT | Performed by: NURSE PRACTITIONER

## 2022-10-28 PROCEDURE — 84207 ASSAY OF VITAMIN B-6: CPT | Performed by: NURSE PRACTITIONER

## 2022-10-28 PROCEDURE — 86038 ANTINUCLEAR ANTIBODIES: CPT | Performed by: NURSE PRACTITIONER

## 2022-10-28 PROCEDURE — 82175 ASSAY OF ARSENIC: CPT | Performed by: NURSE PRACTITIONER

## 2022-10-28 PROCEDURE — 83090 ASSAY OF HOMOCYSTEINE: CPT | Performed by: NURSE PRACTITIONER

## 2022-10-28 PROCEDURE — 83921 ORGANIC ACID SINGLE QUANT: CPT | Performed by: NURSE PRACTITIONER

## 2022-10-28 PROCEDURE — 82607 VITAMIN B-12: CPT | Performed by: NURSE PRACTITIONER

## 2022-10-28 PROCEDURE — 86364 TISS TRNSGLTMNASE EA IG CLAS: CPT | Performed by: NURSE PRACTITIONER

## 2022-10-28 PROCEDURE — 99214 OFFICE O/P EST MOD 30 MIN: CPT | Performed by: NURSE PRACTITIONER

## 2022-10-28 PROCEDURE — 86258 DGP ANTIBODY EACH IG CLASS: CPT | Performed by: NURSE PRACTITIONER

## 2022-10-28 PROCEDURE — 85652 RBC SED RATE AUTOMATED: CPT | Performed by: NURSE PRACTITIONER

## 2022-10-28 PROCEDURE — 86162 COMPLEMENT TOTAL (CH50): CPT | Performed by: NURSE PRACTITIONER

## 2022-10-28 PROCEDURE — 80061 LIPID PANEL: CPT | Performed by: NURSE PRACTITIONER

## 2022-10-28 PROCEDURE — 82728 ASSAY OF FERRITIN: CPT | Performed by: NURSE PRACTITIONER

## 2022-10-28 RX ORDER — TIRZEPATIDE 2.5 MG/.5ML
2.5 INJECTION, SOLUTION SUBCUTANEOUS WEEKLY
Qty: 2 ML | Refills: 0 | Status: SHIPPED | OUTPATIENT
Start: 2022-10-28 | End: 2022-12-30

## 2022-10-28 RX ORDER — MELOXICAM 15 MG/1
15 TABLET ORAL DAILY
Qty: 90 TABLET | Refills: 1 | Status: SHIPPED | OUTPATIENT
Start: 2022-10-28

## 2022-10-28 NOTE — TELEPHONE ENCOUNTER
[employer] prior authorization denied.   Key: [id number]  PA case id: [id number]    [employer] message sent to the patient regarding denial.

## 2022-10-28 NOTE — PROGRESS NOTES
"Subjective     Grace Clement is a 47 y.o. female.     History of Present Illness  Patient is here today for 1 month follow-up on hand and knee pain.  Patient was previously referred to rheumatology for positive PHILIPPE  Pt is seeing neurology/spine for her back pain.   Pt is interested in starting mounjaro for weight loss.  She has been trying to lift some weights 2 days a week  She is busy bc she is trying to get her highschool diploma.   She has been eating more healthily.   She snacks at night.     Hand/elbow pain- this is a chronic issue. She states she has pain when she straightens her arms. She states her hands fall asleep often. She has chronic low back pain.  Patient was started on meloxicam 15 mg daily.  She reports that this is helping quite a bit. She is feeling like a new person.      Right knee pain-this is a chronic issue for the last 4 years.  Patient did not want to see Ortho yet so she was started on meloxicam 15 mg daily.  She reports that her pain has improved quite a bit.           The following portions of the patient's history were reviewed and updated as appropriate: allergies, current medications, past family history, past medical history, past social history, past surgical history and problem list.    Review of Systems   Constitutional: Negative for chills, diaphoresis and fever.   Respiratory: Negative for chest tightness and shortness of breath.    Cardiovascular: Negative for chest pain and palpitations.   Musculoskeletal: Positive for back pain.   Neurological: Negative for dizziness and headache.       Objective     /84 (BP Location: Left arm, Patient Position: Sitting, Cuff Size: Large Adult)   Pulse 77   Temp 98 °F (36.7 °C) (Temporal)   Ht 160 cm (63\")   Wt (!) 138 kg (305 lb)   SpO2 99%   BMI 54.03 kg/m²     Current Outpatient Medications on File Prior to Visit   Medication Sig Dispense Refill   • albuterol (ACCUNEB) 0.63 MG/3ML nebulizer solution Take 3 mL by nebulization " Every 6 (Six) Hours As Needed for Wheezing. 360 mL 3   • aspirin 81 MG tablet Take 81 mg by mouth Daily.     • atorvastatin (LIPITOR) 20 MG tablet Take 1 tablet by mouth Daily. 90 tablet 1   • Breztri Aerosphere 160-9-4.8 MCG/ACT aerosol inhaler      • fexofenadine (ALLEGRA) 60 MG tablet MUCINEX ALLERGY TABLET     • fluticasone (FLONASE) 50 MCG/ACT nasal spray 2 sprays into the nostril(s) as directed by provider Daily.     • gabapentin (NEURONTIN) 300 MG capsule Wk1: take 1 tab at bedtime, Wk2: take 1 pill twice a day, Wk3 1 tab tree times a day 90 capsule 5   • montelukast (Singulair) 10 MG tablet Take 1 tablet by mouth Every Night. 90 tablet 1   • vitamin D3 125 MCG (5000 UT) capsule capsule Take 1 capsule by mouth Daily.     • albuterol sulfate  (90 Base) MCG/ACT inhaler Inhale 2 puffs As Needed.       No current facility-administered medications on file prior to visit.        Physical Exam  Vitals reviewed.   Constitutional:       General: She is not in acute distress.     Appearance: Normal appearance. She is well-developed. She is obese. She is not diaphoretic.   HENT:      Head: Normocephalic and atraumatic.   Eyes:      General:         Right eye: No discharge.         Left eye: No discharge.      Extraocular Movements: Extraocular movements intact.      Conjunctiva/sclera: Conjunctivae normal.   Cardiovascular:      Rate and Rhythm: Normal rate and regular rhythm.   Pulmonary:      Effort: Pulmonary effort is normal. No respiratory distress.      Breath sounds: Normal breath sounds. No wheezing or rales.   Abdominal:      General: Bowel sounds are normal.      Palpations: Abdomen is soft.   Musculoskeletal:         General: Normal range of motion.      Cervical back: Normal range of motion.   Skin:     General: Skin is warm and dry.   Neurological:      General: No focal deficit present.      Mental Status: She is alert and oriented to person, place, and time.   Psychiatric:         Mood and Affect:  Mood normal.         Behavior: Behavior normal.         Thought Content: Thought content normal.         Judgment: Judgment normal.           Assessment & Plan     Diagnoses and all orders for this visit:    1. BMI 50.0-59.9, adult ([facility]) (Primary)  Comments:  start mounjaro  download coupon card from website  work on diet and exercise  f/u 3 mo  Orders:  -     Tirzepatide (Mounjaro) 2.5 MG/0.5ML solution pen-injector; Inject 2.5 mg under the skin into the appropriate area as directed 1 (One) Time Per Week.  Dispense: 2 mL; Refill: 0    2. Pain in both hands  Comments:  improved  cont mobic  check kidney function  Orders:  -     meloxicam (Mobic) 15 MG tablet; Take 1 tablet by mouth Daily.  Dispense: 90 tablet; Refill: 1    3. Morbid obesity with BMI of 50.0-59.9, adult ([facility])  Comments:  start mounjaro  download coupon card from website  work on diet and exercise  f/u 3 mo

## 2022-10-28 NOTE — PATIENT INSTRUCTIONS
Start mounjaro- get coupon card off their website  Cont mobic  Check labs  Push water intake  Work on diet and exercise

## 2022-10-29 LAB
GLIADIN PEPTIDE IGA SER-ACNC: 3 UNITS (ref 0–19)
IGA SERPL-MCNC: 88 MG/DL (ref 87–352)
TTG IGA SER-ACNC: <2 U/ML (ref 0–3)

## 2022-10-30 ENCOUNTER — PATIENT MESSAGE (OUTPATIENT)
Dept: FAMILY MEDICINE CLINIC | Facility: CLINIC | Age: 47
End: 2022-10-30

## 2022-10-31 LAB
ALBUMIN SERPL ELPH-MCNC: 3.6 G/DL (ref 2.9–4.4)
ALBUMIN/GLOB SERPL: 1.2 {RATIO} (ref 0.7–1.7)
ALPHA1 GLOB SERPL ELPH-MCNC: 0.3 G/DL (ref 0–0.4)
ALPHA2 GLOB SERPL ELPH-MCNC: 0.7 G/DL (ref 0.4–1)
ANA SER QL: POSITIVE
B-GLOBULIN SERPL ELPH-MCNC: 1 G/DL (ref 0.7–1.3)
CH50 SERPL-ACNC: 56 U/ML
COPPER SERPL-MCNC: 140 UG/DL (ref 80–158)
GAMMA GLOB SERPL ELPH-MCNC: 1 G/DL (ref 0.4–1.8)
GLOBULIN SER CALC-MCNC: 3 G/DL (ref 2.2–3.9)
LABORATORY COMMENT REPORT: NORMAL
M PROTEIN SERPL ELPH-MCNC: NORMAL G/DL
PROT PATTERN SERPL ELPH-IMP: NORMAL
PROT SERPL-MCNC: 6.6 G/DL (ref 6–8.5)
PYRIDOXAL PHOS SERPL-MCNC: 17.4 UG/L (ref 3.4–65.2)

## 2022-11-01 DIAGNOSIS — D64.9 LOW HEMOGLOBIN: Primary | ICD-10-CM

## 2022-11-01 LAB
ARSENIC BLD-MCNC: 1 UG/L (ref 0–9)
CENTROMERE B AB SER-ACNC: >8 AI (ref 0–0.9)
CHROMATIN AB SERPL-ACNC: 0.2 AI (ref 0–0.9)
DSDNA AB SER-ACNC: <1 IU/ML (ref 0–9)
ENA JO1 AB SER-ACNC: <0.2 AI (ref 0–0.9)
ENA RNP AB SER-ACNC: <0.2 AI (ref 0–0.9)
ENA SCL70 AB SER-ACNC: <0.2 AI (ref 0–0.9)
ENA SM AB SER-ACNC: <0.2 AI (ref 0–0.9)
ENA SM+RNP AB SER-ACNC: <0.2 AI (ref 0–0.9)
ENA SS-A AB SER-ACNC: <0.2 AI (ref 0–0.9)
ENA SS-B AB SER-ACNC: <0.2 AI (ref 0–0.9)
LEAD BLDV-MCNC: <1 UG/DL (ref 0–3.4)
Lab: ABNORMAL
MERCURY BLD-MCNC: <1 UG/L (ref 0–14.9)
RIBOSOMAL P AB SER-ACNC: <0.2 AI (ref 0–0.9)

## 2022-11-03 LAB
CRYOGLOB SER QL 1D COLD INC: NORMAL
VIT B1 BLD-SCNC: 113.1 NMOL/L (ref 66.5–200)

## 2022-11-04 LAB — METHYLMALONATE SERPL-SCNC: 267 NMOL/L (ref 0–378)

## 2022-11-06 LAB
A-TOCOPHEROL VIT E SERPL-MCNC: 7.6 MG/L (ref 7–25.1)
GAMMA TOCOPHEROL SERPL-MCNC: 2.4 MG/L (ref 0.5–5.5)

## 2022-11-10 ENCOUNTER — TELEPHONE (OUTPATIENT)
Dept: ONCOLOGY | Facility: CLINIC | Age: 47
End: 2022-11-10

## 2022-11-10 NOTE — TELEPHONE ENCOUNTER
Hub is instructed to read the documentation below to patient  ATTEMPTED TO CONTACT PATIENT REGARDING HEMATOLOGY REFERRAL.  NO ANSWER. LMV ASKING PATIENT TO CALL BACK.

## 2022-12-02 ENCOUNTER — LAB (OUTPATIENT)
Dept: LAB | Facility: HOSPITAL | Age: 47
End: 2022-12-02
Payer: COMMERCIAL

## 2022-12-02 ENCOUNTER — CONSULT (OUTPATIENT)
Dept: ONCOLOGY | Facility: CLINIC | Age: 47
End: 2022-12-02

## 2022-12-02 VITALS
HEART RATE: 75 BPM | TEMPERATURE: 96.6 F | WEIGHT: 293 LBS | OXYGEN SATURATION: 99 % | SYSTOLIC BLOOD PRESSURE: 122 MMHG | DIASTOLIC BLOOD PRESSURE: 82 MMHG | BODY MASS INDEX: 51.91 KG/M2 | HEIGHT: 63 IN

## 2022-12-02 DIAGNOSIS — E61.1 IRON DEFICIENCY: ICD-10-CM

## 2022-12-02 DIAGNOSIS — E61.1 IRON DEFICIENCY: Primary | ICD-10-CM

## 2022-12-02 DIAGNOSIS — N95.0 POST-MENOPAUSE BLEEDING: ICD-10-CM

## 2022-12-02 LAB
ALBUMIN SERPL-MCNC: 4.1 G/DL (ref 3.5–5.2)
ALBUMIN/GLOB SERPL: 1.6 G/DL
ALP SERPL-CCNC: 80 U/L (ref 39–117)
ALT SERPL W P-5'-P-CCNC: 13 U/L (ref 1–33)
ANION GAP SERPL CALCULATED.3IONS-SCNC: 10 MMOL/L (ref 5–15)
AST SERPL-CCNC: 16 U/L (ref 1–32)
BASOPHILS # BLD AUTO: 0.02 10*3/MM3 (ref 0–0.2)
BASOPHILS NFR BLD AUTO: 0.4 % (ref 0–1.5)
BILIRUB SERPL-MCNC: 0.8 MG/DL (ref 0–1.2)
BUN SERPL-MCNC: 9 MG/DL (ref 6–20)
BUN/CREAT SERPL: 11.1 (ref 7–25)
CALCIUM SPEC-SCNC: 9.2 MG/DL (ref 8.6–10.5)
CHLORIDE SERPL-SCNC: 103 MMOL/L (ref 98–107)
CO2 SERPL-SCNC: 28 MMOL/L (ref 22–29)
CREAT SERPL-MCNC: 0.81 MG/DL (ref 0.57–1)
DEPRECATED RDW RBC AUTO: 48.6 FL (ref 37–54)
EGFRCR SERPLBLD CKD-EPI 2021: 89.1 ML/MIN/1.73
EOSINOPHIL # BLD AUTO: 0.14 10*3/MM3 (ref 0–0.4)
EOSINOPHIL NFR BLD AUTO: 2.8 % (ref 0.3–6.2)
ERYTHROCYTE [DISTWIDTH] IN BLOOD BY AUTOMATED COUNT: 15.3 % (ref 12.3–15.4)
FERRITIN SERPL-MCNC: 27.26 NG/ML (ref 13–150)
GLOBULIN UR ELPH-MCNC: 2.5 GM/DL
GLUCOSE SERPL-MCNC: 99 MG/DL (ref 65–99)
HAPTOGLOB SERPL-MCNC: 167 MG/DL (ref 30–200)
HCT VFR BLD AUTO: 32.2 % (ref 34–46.6)
HGB BLD-MCNC: 10.3 G/DL (ref 12–15.9)
IRON 24H UR-MRATE: 42 MCG/DL (ref 37–145)
IRON SATN MFR SERPL: 9 % (ref 20–50)
LDH SERPL-CCNC: 195 U/L (ref 135–214)
LYMPHOCYTES # BLD AUTO: 1.07 10*3/MM3 (ref 0.7–3.1)
LYMPHOCYTES NFR BLD AUTO: 21.4 % (ref 19.6–45.3)
MCH RBC QN AUTO: 28.4 PG (ref 26.6–33)
MCHC RBC AUTO-ENTMCNC: 32 G/DL (ref 31.5–35.7)
MCV RBC AUTO: 88.7 FL (ref 79–97)
MONOCYTES # BLD AUTO: 0.35 10*3/MM3 (ref 0.1–0.9)
MONOCYTES NFR BLD AUTO: 7 % (ref 5–12)
NEUTROPHILS NFR BLD AUTO: 3.43 10*3/MM3 (ref 1.7–7)
NEUTROPHILS NFR BLD AUTO: 68.4 % (ref 42.7–76)
PLATELET # BLD AUTO: 232 10*3/MM3 (ref 140–450)
PMV BLD AUTO: 9 FL (ref 6–12)
POTASSIUM SERPL-SCNC: 4.3 MMOL/L (ref 3.5–5.2)
PROT SERPL-MCNC: 6.6 G/DL (ref 6–8.5)
RBC # BLD AUTO: 3.63 10*6/MM3 (ref 3.77–5.28)
RETICS # AUTO: 0.1 10*6/MM3 (ref 0.02–0.13)
RETICS/RBC NFR AUTO: 3.04 % (ref 0.7–1.9)
SODIUM SERPL-SCNC: 141 MMOL/L (ref 136–145)
TIBC SERPL-MCNC: 451 MCG/DL (ref 298–536)
TRANSFERRIN SERPL-MCNC: 303 MG/DL (ref 200–360)
VIT B12 BLD-MCNC: 669 PG/ML (ref 211–946)
WBC NRBC COR # BLD: 5.01 10*3/MM3 (ref 3.4–10.8)

## 2022-12-02 PROCEDURE — 83540 ASSAY OF IRON: CPT | Performed by: INTERNAL MEDICINE

## 2022-12-02 PROCEDURE — 85025 COMPLETE CBC W/AUTO DIFF WBC: CPT | Performed by: INTERNAL MEDICINE

## 2022-12-02 PROCEDURE — 80053 COMPREHEN METABOLIC PANEL: CPT

## 2022-12-02 PROCEDURE — 85045 AUTOMATED RETICULOCYTE COUNT: CPT | Performed by: INTERNAL MEDICINE

## 2022-12-02 PROCEDURE — 83010 ASSAY OF HAPTOGLOBIN QUANT: CPT

## 2022-12-02 PROCEDURE — 82728 ASSAY OF FERRITIN: CPT | Performed by: INTERNAL MEDICINE

## 2022-12-02 PROCEDURE — 84466 ASSAY OF TRANSFERRIN: CPT | Performed by: INTERNAL MEDICINE

## 2022-12-02 PROCEDURE — 82607 VITAMIN B-12: CPT

## 2022-12-02 PROCEDURE — 83615 LACTATE (LD) (LDH) ENZYME: CPT

## 2022-12-02 PROCEDURE — 99204 OFFICE O/P NEW MOD 45 MIN: CPT | Performed by: INTERNAL MEDICINE

## 2022-12-02 PROCEDURE — 36415 COLL VENOUS BLD VENIPUNCTURE: CPT

## 2022-12-02 RX ORDER — IBUPROFEN 400 MG/1
400 TABLET ORAL EVERY 6 HOURS PRN
COMMUNITY
End: 2022-12-30

## 2022-12-05 ENCOUNTER — PATIENT ROUNDING (BHMG ONLY) (OUTPATIENT)
Dept: ONCOLOGY | Facility: CLINIC | Age: 47
End: 2022-12-05

## 2022-12-05 NOTE — PROGRESS NOTES
December 5, 2022    Hello, may I speak with Grace Clement?    My name is Naila Bailey      I am  with [facility] [address] [place] [address] [phone].    Before we get started may I verify your date of birth? 1975    I am calling to officially welcome you to our practice and ask about your recent visit. Is this a good time to talk? no    Tell me about your visit with us. What things went well?  A My Chart message was sent to the patient.       We're always looking for ways to make our patients' experiences even better. Do you have recommendations on ways we may improve?  no    Overall were you satisfied with your first visit to our practice? yes       I appreciate you taking the time to speak with me today. Is there anything else I can do for you? no      Thank you, and have a great day.

## 2022-12-06 ENCOUNTER — LAB (OUTPATIENT)
Dept: LAB | Facility: HOSPITAL | Age: 47
End: 2022-12-06
Payer: COMMERCIAL

## 2022-12-06 DIAGNOSIS — E61.1 IRON DEFICIENCY: ICD-10-CM

## 2022-12-06 LAB — HEMOCCULT STL QL IA: NEGATIVE

## 2022-12-06 PROCEDURE — 82274 ASSAY TEST FOR BLOOD FECAL: CPT

## 2022-12-29 NOTE — PROGRESS NOTES
HEMATOLOGY ONCOLOGY FOLLOW UP        Patient name: Grace Clement  : 1975  MRN: [id number]  Primary Care Physician: Jaye Ogden APRN  Referring Physician: Jaye Ogden APRN  Reason For Consult:     History of Present Illness:    2022: In the office for initial evaluation. Had anemia for a long time, at least for 3 years. For the most part normocytic. Not clearly progressive and not associated to any symptoms. Complained of fatigue but carried a history of severe chronic obstructive pulmonary disease and was on oxygen for at least 2 years. Also suffered from very severe obesity. Had been limited by her dyspnea and difficulties with standing up for long periods of time. Eating well and no nausea or vomiting. Persistently dyspneic and with some cough and expectoration. No chest pain or dysphagia. No abdominal pain and no diarrhea. Denied melena, hematochezia or hematuria. Had been taking meloxicam regularly. Intermittently taking ibuprofen 400 mg per dose. Also receiving daily low dose acetyl salicylic acid.     2022: Feeling about the same. Eating well and no weight loss. No bleeding. No fever. On exam there was no significant change. The laboratory exams were consistent with iron deficiency. A decision was made to start treatment with oral iron. She was given instructions and prescriptions were sent. She was asked to return in 2 months     Subjective:  2022: Without new symptoms. Eating well and without weight loss. No fevers. No chest pain or cough and no abdominal pain or diarrhea. She had remained without edema.     The following portions of the patient's history were reviewed and updated as appropriate: allergies, current medications, past family history, past medical history, past social history, past surgical history and problem list.    Past Medical History:   Diagnosis Date   • Allergic    • Asthma    • COPD (chronic obstructive pulmonary disease)  (HCC)    • Hyperlipidemia    • Vitamin D deficiency        Past Surgical History:   Procedure Laterality Date   • CHOLECYSTECTOMY     • COLONOSCOPY  7/20/2022   • CRYOABLATION     • LAPAROSCOPIC TUBAL LIGATION     • TUBAL ABDOMINAL LIGATION  6/16/2005       Current Outpatient Medications:   •  albuterol (ACCUNEB) 0.63 MG/3ML nebulizer solution, Take 3 mL by nebulization Every 6 (Six) Hours As Needed for Wheezing., Disp: 360 mL, Rfl: 3  •  albuterol sulfate  (90 Base) MCG/ACT inhaler, Inhale 2 puffs As Needed., Disp: , Rfl:   •  aspirin 81 MG tablet, Take 81 mg by mouth Daily., Disp: , Rfl:   •  atorvastatin (LIPITOR) 20 MG tablet, Take 1 tablet by mouth Daily., Disp: 90 tablet, Rfl: 1  •  Breztri Aerosphere 160-9-4.8 MCG/ACT aerosol inhaler, , Disp: , Rfl:   •  fexofenadine (ALLEGRA) 60 MG tablet, MUCINEX ALLERGY TABLET, Disp: , Rfl:   •  fluticasone (FLONASE) 50 MCG/ACT nasal spray, 2 sprays into the nostril(s) as directed by provider Daily., Disp: , Rfl:   •  gabapentin (NEURONTIN) 300 MG capsule, Wk1: take 1 tab at bedtime, Wk2: take 1 pill twice a day, Wk3 1 tab tree times a day, Disp: 90 capsule, Rfl: 5  •  ibuprofen (ADVIL,MOTRIN) 400 MG tablet, Take 400 mg by mouth Every 6 (Six) Hours As Needed for Mild Pain., Disp: , Rfl:   •  meloxicam (Mobic) 15 MG tablet, Take 1 tablet by mouth Daily., Disp: 90 tablet, Rfl: 1  •  montelukast (Singulair) 10 MG tablet, Take 1 tablet by mouth Every Night., Disp: 90 tablet, Rfl: 1  •  Tirzepatide (Mounjaro) 2.5 MG/0.5ML solution pen-injector, Inject 2.5 mg under the skin into the appropriate area as directed 1 (One) Time Per Week., Disp: 2 mL, Rfl: 0  •  vitamin D3 125 MCG (5000 UT) capsule capsule, Take 1 capsule by mouth Daily., Disp: , Rfl:     Allergies   Allergen Reactions   • Codeine Hives, Itching and Rash     Family History   Problem Relation Age of Onset   • Alcohol abuse Mother    • COPD Mother    • Depression Mother    • Vision loss Mother    • Early death  Mother    • Alcohol abuse Father    • Early death Father    • Alcohol abuse Sister    • Depression Sister    • Miscarriages / Stillbirths Sister    • Learning disabilities Sister    • Mental illness Sister    • Miscarriages / Stillbirths Sister    • Alcohol abuse Brother    • COPD Brother    • Hyperlipidemia Brother    • Drug abuse Brother    • Early death Brother    • Arthritis Brother    • Drug abuse Brother    • Cancer Maternal Uncle    • Early death Maternal Uncle    • Heart disease Maternal Grandmother    • Early death Maternal Grandmother    • Anxiety disorder Daughter    • Asthma Daughter    • Depression Daughter    • Drug abuse Daughter    • Learning disabilities Daughter    • Mental illness Daughter      Cancer-related family history includes Cancer in her maternal uncle.    Social History     Tobacco Use   • Smoking status: Former     Packs/day: 1.50     Years: 35.00     Pack years: 52.50     Types: Cigarettes     Start date: 3/10/1984     Quit date: 4/27/2019     Years since quitting: 3.6   • Smokeless tobacco: Never   Vaping Use   • Vaping Use: Former   Substance Use Topics   • Alcohol use: No   • Drug use: Not Currently     Social History     Social History Narrative   • Not on file      I have reviewed the history of present illness, past medical history, family history, social history, lab results, all notes and other records since the patient was last seen on  11/10/2022.    ROS:   Review of Systems   Constitutional: Positive for fatigue. Negative for activity change, appetite change, chills, diaphoresis, fever and unexpected weight change.   HENT: Negative for congestion, dental problem, drooling, ear discharge, ear pain, facial swelling, hearing loss, mouth sores, nosebleeds, postnasal drip, rhinorrhea, sinus pressure, sinus pain, sneezing, sore throat, tinnitus, trouble swallowing and voice change.    Eyes: Negative for photophobia, pain, discharge, redness, itching and visual disturbance.    Respiratory: Negative for apnea, cough, choking, chest tightness, shortness of breath, wheezing and stridor.    Cardiovascular: Negative for chest pain, palpitations and leg swelling.   Gastrointestinal: Negative for abdominal distention, abdominal pain, anal bleeding, blood in stool, constipation, diarrhea, nausea, rectal pain and vomiting.   Endocrine: Negative for cold intolerance, heat intolerance, polydipsia and polyuria.   Genitourinary: Positive for vaginal bleeding. Negative for decreased urine volume, difficulty urinating, dysuria, flank pain, frequency, genital sores, hematuria and urgency.   Musculoskeletal: Negative for arthralgias, back pain, gait problem, joint swelling, myalgias, neck pain and neck stiffness.   Skin: Negative for color change, pallor and rash.   Neurological: Negative for dizziness, tremors, seizures, syncope, facial asymmetry, speech difficulty, weakness, light-headedness, numbness and headaches.   Hematological: Negative for adenopathy. Does not bruise/bleed easily.   Psychiatric/Behavioral: Negative for agitation, behavioral problems, confusion, decreased concentration, hallucinations, self-injury, sleep disturbance and suicidal ideas. The patient is not nervous/anxious.      Objective:  Vital signs:  There were no vitals filed for this visit.  There is no height or weight on file to calculate BMI.  ECOG  (2) Ambulatory and capable of self care, unable to carry out work activity, up and about > 50% or waking hours    Physical Exam:   Physical Exam  Constitutional:       General: She is not in acute distress.     Appearance: She is obese. She is ill-appearing. She is not toxic-appearing or diaphoretic.   HENT:      Head: Normocephalic and atraumatic.      Right Ear: External ear normal.      Left Ear: External ear normal.      Nose: Nose normal.      Mouth/Throat:      Mouth: Mucous membranes are moist.      Pharynx: Oropharynx is clear. No oropharyngeal exudate or posterior  oropharyngeal erythema.   Eyes:      General: No scleral icterus.        Right eye: No discharge.         Left eye: No discharge.      Conjunctiva/sclera: Conjunctivae normal.      Pupils: Pupils are equal, round, and reactive to light.   Cardiovascular:      Rate and Rhythm: Normal rate and regular rhythm.      Pulses: Normal pulses.      Heart sounds: No murmur heard.    No friction rub. No gallop.   Pulmonary:      Effort: No respiratory distress.      Breath sounds: No stridor. No wheezing, rhonchi or rales.   Abdominal:      General: Abdomen is flat. Bowel sounds are normal. There is no distension.      Palpations: Abdomen is soft. There is no mass.      Tenderness: There is no abdominal tenderness. There is no right CVA tenderness, left CVA tenderness, guarding or rebound.      Hernia: No hernia is present.   Musculoskeletal:         General: No swelling, tenderness, deformity or signs of injury.      Cervical back: No rigidity.      Right lower leg: No edema.      Left lower leg: No edema.   Lymphadenopathy:      Cervical: No cervical adenopathy.   Skin:     Coloration: Skin is not jaundiced.      Findings: No bruising, lesion or rash.   Neurological:      General: No focal deficit present.      Mental Status: She is alert and oriented to person, place, and time.      Cranial Nerves: No cranial nerve deficit.      Motor: No weakness.      Gait: Gait normal.   Psychiatric:         Mood and Affect: Mood normal.         Behavior: Behavior normal.         Thought Content: Thought content normal.         Judgment: Judgment normal.     SHAYAN Resendiz MD performed the physical exam on 12/30/2022 as documented above.     Lab Results - Last 18 Months   Lab Units 12/02/22  1302 10/28/22  1521 04/20/22  1534   WBC 10*3/mm3 5.01 5.21 5.60   HEMOGLOBIN g/dL 10.3* 9.8* 11.5*   HEMATOCRIT % 32.2* 30.2* 36.2   PLATELETS 10*3/mm3 232 268 253   MCV fL 88.7 85.1 88.9     Lab Results - Last 18 Months   Lab Units  12/02/22  1358 10/28/22  1521 04/20/22  1534   SODIUM mmol/L 141 138 141   POTASSIUM mmol/L 4.3 3.5 4.1   CHLORIDE mmol/L 103 101 103   TOTAL CO2 mmol/L  --   --  24   CO2 mmol/L 28.0 28.6  --    BUN mg/dL 9 10 9   CREATININE mg/dL 0.81 0.87 0.77   CALCIUM mg/dL 9.2 9.0 9.4   BILIRUBIN mg/dL 0.8 0.9 0.8   ALK PHOS U/L 80 69 68   ALT (SGPT) U/L 13 14 12   AST (SGOT) U/L 16 20 26   GLUCOSE mg/dL 99 80  --      Lab Results   Component Value Date    GLUCOSE 99 12/02/2022    BUN 9 12/02/2022    CREATININE 0.81 12/02/2022    EGFRIFNONA 88 04/03/2020    BCR 11.1 12/02/2022    K 4.3 12/02/2022    CO2 28.0 12/02/2022    CALCIUM 9.2 12/02/2022    PROTENTOTREF 6.6 10/28/2022    ALBUMIN 4.10 12/02/2022    LABIL2 1.2 10/28/2022    AST 16 12/02/2022    ALT 13 12/02/2022     Lab Results   Component Value Date    IRON 42 12/02/2022    TIBC 451 12/02/2022    FERRITIN 27.26 12/02/2022     Lab Results   Component Value Date    FOLATE 7.97 10/28/2022     Lab Results   Component Value Date    FFUBNGVL87 669 12/02/2022     Lab Results   Component Value Date    SPEP Comment 10/28/2022     Lab Results   Component Value Date    PHILIPPE Positive (A) 10/28/2022    SEDRATE 11 10/28/2022     Lab Results   Component Value Date    PTT 19.3 (L) 04/04/2019    INR 0.9 04/04/2019     Lab Results   Component Value Date    SEDRATE 11 10/28/2022     Assessment & Plan       Assessment:  1. Chronic normocytic anemia. Appears to be the result of iron deficiency. Discussed with her and review the laboratory exams. Explained the use of iron and sent new prescriptions.   2. No longer using non steroidal anti-inflammatory drugs. Again discussed with her.   3. Postmenopausal vaginal bleeding. Has yet to see gynecology but has an appointment already  4. BMI greater than 50  5. She will see me in 2 months after receiving oral iron 325 mg every other day    Plan:  1. As above.     Oleksandr Resendiz MD on 12/30/2022 at 12:45 PM.

## 2022-12-30 ENCOUNTER — LAB (OUTPATIENT)
Dept: LAB | Facility: HOSPITAL | Age: 47
End: 2022-12-30
Payer: COMMERCIAL

## 2022-12-30 ENCOUNTER — APPOINTMENT (OUTPATIENT)
Dept: LAB | Facility: HOSPITAL | Age: 47
End: 2022-12-30
Payer: COMMERCIAL

## 2022-12-30 ENCOUNTER — OFFICE VISIT (OUTPATIENT)
Dept: ONCOLOGY | Facility: CLINIC | Age: 47
End: 2022-12-30

## 2022-12-30 VITALS
TEMPERATURE: 98.2 F | OXYGEN SATURATION: 98 % | HEART RATE: 84 BPM | DIASTOLIC BLOOD PRESSURE: 80 MMHG | HEIGHT: 63 IN | BODY MASS INDEX: 51.91 KG/M2 | WEIGHT: 293 LBS | SYSTOLIC BLOOD PRESSURE: 129 MMHG

## 2022-12-30 DIAGNOSIS — E61.1 IRON DEFICIENCY: Primary | ICD-10-CM

## 2022-12-30 LAB
BASOPHILS # BLD AUTO: 0.02 10*3/MM3 (ref 0–0.2)
BASOPHILS NFR BLD AUTO: 0.4 % (ref 0–1.5)
DEPRECATED RDW RBC AUTO: 46.6 FL (ref 37–54)
EOSINOPHIL # BLD AUTO: 0.28 10*3/MM3 (ref 0–0.4)
EOSINOPHIL NFR BLD AUTO: 5.3 % (ref 0.3–6.2)
ERYTHROCYTE [DISTWIDTH] IN BLOOD BY AUTOMATED COUNT: 14.7 % (ref 12.3–15.4)
HCT VFR BLD AUTO: 32 % (ref 34–46.6)
HGB BLD-MCNC: 10 G/DL (ref 12–15.9)
HOLD SPECIMEN: NORMAL
HOLD SPECIMEN: NORMAL
LYMPHOCYTES # BLD AUTO: 1.41 10*3/MM3 (ref 0.7–3.1)
LYMPHOCYTES NFR BLD AUTO: 26.6 % (ref 19.6–45.3)
MCH RBC QN AUTO: 28.1 PG (ref 26.6–33)
MCHC RBC AUTO-ENTMCNC: 31.3 G/DL (ref 31.5–35.7)
MCV RBC AUTO: 89.9 FL (ref 79–97)
MONOCYTES # BLD AUTO: 0.32 10*3/MM3 (ref 0.1–0.9)
MONOCYTES NFR BLD AUTO: 6 % (ref 5–12)
NEUTROPHILS NFR BLD AUTO: 3.28 10*3/MM3 (ref 1.7–7)
NEUTROPHILS NFR BLD AUTO: 61.7 % (ref 42.7–76)
PLATELET # BLD AUTO: 215 10*3/MM3 (ref 140–450)
PMV BLD AUTO: 8.3 FL (ref 6–12)
RBC # BLD AUTO: 3.56 10*6/MM3 (ref 3.77–5.28)
WBC NRBC COR # BLD: 5.31 10*3/MM3 (ref 3.4–10.8)

## 2022-12-30 PROCEDURE — 85025 COMPLETE CBC W/AUTO DIFF WBC: CPT

## 2022-12-30 PROCEDURE — 36415 COLL VENOUS BLD VENIPUNCTURE: CPT

## 2022-12-30 PROCEDURE — 99213 OFFICE O/P EST LOW 20 MIN: CPT | Performed by: INTERNAL MEDICINE

## 2022-12-30 RX ORDER — DOXYCYCLINE HYCLATE 50 MG/1
324 CAPSULE, GELATIN COATED ORAL EVERY OTHER DAY
Qty: 15 TABLET | Refills: 11 | Status: SHIPPED | OUTPATIENT
Start: 2022-12-30

## 2023-01-27 ENCOUNTER — OFFICE VISIT (OUTPATIENT)
Dept: FAMILY MEDICINE CLINIC | Facility: CLINIC | Age: 48
End: 2023-01-27
Payer: COMMERCIAL

## 2023-01-27 VITALS
WEIGHT: 293 LBS | HEART RATE: 100 BPM | OXYGEN SATURATION: 98 % | DIASTOLIC BLOOD PRESSURE: 65 MMHG | SYSTOLIC BLOOD PRESSURE: 143 MMHG | TEMPERATURE: 97.8 F | BODY MASS INDEX: 54.74 KG/M2

## 2023-01-27 DIAGNOSIS — M54.9 MID BACK PAIN: Primary | ICD-10-CM

## 2023-01-27 PROCEDURE — 99214 OFFICE O/P EST MOD 30 MIN: CPT | Performed by: NURSE PRACTITIONER

## 2023-01-27 NOTE — PROGRESS NOTES
Answers for HPI/ROS submitted by the patient on 1/21/2023  Please describe your symptoms.: This is a follow up appointment, but would like to discuss back pain I've been having.  Have you had these symptoms before?: Yes  How long have you been having these symptoms?: Greater than 2 weeks  Please list any medications you are currently taking for this condition.: Tylenol  What is the primary reason for your visit?: Other    Subjective     Grace Clement is a 47 y.o. female.     History of Present Illness  Pt is here today to follow up on weight and with c/o back pain.    Obesity-at last patient visit we tried getting her on 1 Jacy for weight management.  This was denied by insurance. She has been working on making diet changes and portion control. She struggles with night eating. She is going to start a vitamin supplement that is supposed to help curb the appetite. She has looked at it with her pharmacist.     Back pain- patient has been seen neurology for paresthesia in both feet.  They believe this is neuropathic in origin.  She is currently on gabapentin 300 mg 3 times daily.  She also takes meloxicam 15 mg daily.  Patient reports that she has been having mid back pain. She has had the pain for a few months. Its near where her bra lays. Its intermittent. She feels like she has to stretch her back. Uses heating pad on occasion. It doesn't keep her from doing activities    Anemia- seeing hematology. Was placed on iron pill.        The following portions of the patient's history were reviewed and updated as appropriate: allergies, current medications, past family history, past medical history, past social history, past surgical history and problem list.    Review of Systems   Constitutional: Positive for fatigue. Negative for chills and fever.   Respiratory: Positive for shortness of breath. Negative for chest tightness.    Cardiovascular: Negative for chest pain and palpitations.   Gastrointestinal: Negative for  abdominal pain, vomiting and indigestion.   Musculoskeletal: Positive for back pain.   Neurological: Positive for numbness. Negative for dizziness and headache.       Objective     /65 (BP Location: Left arm, Patient Position: Sitting, Cuff Size: Large Adult)   Pulse 100   Temp 97.8 °F (36.6 °C) (Tympanic)   Wt (!) 140 kg (309 lb)   SpO2 98%   BMI 54.74 kg/m²     Current Outpatient Medications on File Prior to Visit   Medication Sig Dispense Refill   • albuterol (ACCUNEB) 0.63 MG/3ML nebulizer solution Take 3 mL by nebulization Every 6 (Six) Hours As Needed for Wheezing. 360 mL 3   • albuterol sulfate  (90 Base) MCG/ACT inhaler Inhale 2 puffs As Needed.     • aspirin 81 MG tablet Take 81 mg by mouth Daily.     • atorvastatin (LIPITOR) 20 MG tablet Take 1 tablet by mouth Daily. 90 tablet 1   • Breztri Aerosphere 160-9-4.8 MCG/ACT aerosol inhaler      • ferrous gluconate (FERGON) 324 MG tablet Take 1 tablet by mouth Every Other Day. 15 tablet 11   • fexofenadine (ALLEGRA) 60 MG tablet MUCINEX ALLERGY TABLET     • fluticasone (FLONASE) 50 MCG/ACT nasal spray 2 sprays into the nostril(s) as directed by provider Daily.     • gabapentin (NEURONTIN) 300 MG capsule Wk1: take 1 tab at bedtime, Wk2: take 1 pill twice a day, Wk3 1 tab tree times a day 90 capsule 5   • meloxicam (Mobic) 15 MG tablet Take 1 tablet by mouth Daily. 90 tablet 1   • montelukast (Singulair) 10 MG tablet Take 1 tablet by mouth Every Night. 90 tablet 1   • vitamin D3 125 MCG (5000 UT) capsule capsule Take 1 capsule by mouth Daily.       No current facility-administered medications on file prior to visit.        Physical Exam  Vitals reviewed.   Constitutional:       General: She is not in acute distress.     Appearance: Normal appearance. She is well-developed. She is obese. She is not diaphoretic.   HENT:      Head: Normocephalic and atraumatic.   Eyes:      General:         Right eye: No discharge.         Left eye: No discharge.       Extraocular Movements: Extraocular movements intact.      Conjunctiva/sclera: Conjunctivae normal.   Cardiovascular:      Rate and Rhythm: Normal rate and regular rhythm.      Comments: Difficult to hear heart sounds  Pulmonary:      Effort: Pulmonary effort is normal. No respiratory distress.      Breath sounds: No wheezing or rales.      Comments: Diminished breath sounds  Abdominal:      General: Bowel sounds are normal.      Palpations: Abdomen is soft.   Musculoskeletal:         General: Tenderness (mid back) present. Normal range of motion.      Cervical back: Normal range of motion.   Skin:     General: Skin is warm and dry.   Neurological:      General: No focal deficit present.      Mental Status: She is alert and oriented to person, place, and time.   Psychiatric:         Mood and Affect: Mood normal.         Behavior: Behavior normal.         Thought Content: Thought content normal.         Judgment: Judgment normal.           Assessment & Plan     Diagnoses and all orders for this visit:    1. Mid back pain (Primary)  Comments:  possibly arthritis  work on weight loss  referral to PT  on mobic  tylenol as needed  Orders:  -     Ambulatory Referral to Physical Therapy Evaluate and treat    2. BMI 50.0-59.9, adult (HCC)  Comments:  unable to get injection  work on portion control  starting a natural supplement  monitor weight

## 2023-02-21 ENCOUNTER — TREATMENT (OUTPATIENT)
Dept: PHYSICAL THERAPY | Facility: CLINIC | Age: 48
End: 2023-02-21
Payer: COMMERCIAL

## 2023-02-21 DIAGNOSIS — M54.9 MID BACK PAIN: Primary | ICD-10-CM

## 2023-02-21 PROCEDURE — 97161 PT EVAL LOW COMPLEX 20 MIN: CPT | Performed by: PHYSICAL THERAPIST

## 2023-02-21 PROCEDURE — 97110 THERAPEUTIC EXERCISES: CPT | Performed by: PHYSICAL THERAPIST

## 2023-02-21 NOTE — PROGRESS NOTES
"Physical Therapy Initial Evaluation and Plan of Care    [address] [facility] [place] [address]  [phone] (p)  [phone] (f)    Patient: Grace Clement   : 1975  Diagnosis/ICD-10 Code:  Mid back pain [M54.9]  Referring practitioner: SHAKILA Cavazos  Date of Initial Visit: 2023  Today's Date: 2023  Patient seen for 1 sessions         Subjective Questionnaire: NDI: = 9% dysfunction  Oswestry: 15/50 = 30% dysfunction      Subjective     Pt c/o central mid back pain at level of bra line. Insidious onset ~ 1 year ago, but worse for about a month since she started taking meloxicam and had to stop taking daily ipuprofen/aleve. [facility], nita but not sure if related. Back pain started ~ 3-4 months after. Takes tylenol as needed, helps some. Heating pad and sitting, and stretching, movement (sidebend and rotation) help. Worse with bending, lifting, sweeping, mopping, vacuuming, walking distance, prolonged standing, cooking, doing dishes. Cant lift anything more than 5-10 #. Driving long distances also aggravates (visits son ~ 1 hr away, usually bothers her on way home.) Back pain increases after ~30 mins of shopping, leans on cart. Back stiff in AM but sleeps well, does not interfere with sleep. Cant lay flat on back, sleeps on L side. She has rollator but does not use much. She does not want to take pain medicine. She has arthritis in hands and knee. No xrays taken of back. She has neuropathy in feet. Intermittent \"falling asleep feeling\" hands usually positioning, laying on side or reclined back on couch; sometimes one and sometimes both, intermittent not present currently.    She reports being sedentary due to COPD exacerbation but more active again since getting on supplemental O2 and getting portable unit. She reports gaining weight while sedentary. She was hoping pain would improve with being more active but getting a little worse. Activity " most limited by back pain vs. Breathing at this time.    On disability currently.Wants to get desk job. Currently going to school @ [employer] for high school diploma. Classes are about 1 hr, can generally tolerate sitting that long.    Lives with     Pain 5/10 central ~T8    Med hx: allergies, anemia, arthritis, asthma, COPD, shortness of breath, on supplemental 02, anxiety, low vitamin D, panic attacks, sleep apnea (uses cpap)    Objective          Postural Observations  Seated posture: fair  Standing posture: fair    Additional Postural Observation Details  Rounded shoulders, increased thoracic kyphosis, forward head, UE abducted/IR, holds O2 and purse in front of her.Wearing O2 on L shoulder    Neurological Testing     Sensation   Cervical/Thoracic   Left   Intact: light touch    Right   Intact: light touch    Active Range of Motion     Additional Active Range of Motion Details  Cervical AROM: WFL gross screen mild pulling end range flexion and R rotation.    Thoracic AROM:  Flex better   Ext limited 50% worse stiff  Sidebend better bilat WFL  Rotation no change limited 25%    Shoulder AROM: all painfree  IR T12, with significant compensation thoracic flexion   ER is WFL  Flex and abd mild end range restriction bilat     Strength/Myotome Testing     Additional Strength Details  Weak core and thoracic extensors funcctinoally  UE strength: painfree 4+/5  all directions, scap not tested but functionally weak.     General Comments     Cervical/Thoracic Comments  Ambulation:  Exaggerated APT, lack of arm swing L due to carrying O2, no significant antalgia.                 Assessment & Plan     Assessment  Impairments: abnormal or restricted ROM, activity intolerance, impaired physical strength, lacks appropriate home exercise program and pain with function  Functional Limitations: carrying objects, lifting, sleeping, walking, pulling, pushing, uncomfortable because of pain, moving in bed, sitting, standing,  stooping, reaching behind back, reaching overhead and unable to perform repetitive tasks  Assessment details: Pt presents to PT with symptoms consistent with mid back pain. Chronic issue worse in past month with change in medication and activity. She is motivated to participate in therapy and improve her function in home and community. She would like to control her pain conservatively without use of pain medication. She Pt would benefit from skilled PT intervention to address the deficits noted.      Prognosis: good    Goals  Plan Goals: SHORT TERM GOALS:   1. Pt can demonstrate initial HEP.  2. Pt reports at least 50% improvement overall, improved thoracic AROM and less pain.  3. Pt can tolerate upper core strength ex.   4. Pt to demonstrate body mechanics to decrease stress at thoracic spine.    LONG TERN GOALS:   1. Oswestry score <10% or better by DC.  2. Pt to shop 1 hour prior to sitting rest break by D/C  3. Pt to drive 2 hours to visit son without increased back pain by D/C  4. Pt to voice readiness for D/C to indpendent HEP and self management of symptoms.    Plan  Therapy options: will be seen for skilled therapy services  Planned modality interventions: cryotherapy, electrical stimulation/Kosovan stimulation, ultrasound, thermotherapy (hydrocollator packs) and dry needling  Planned therapy interventions: manual therapy, joint mobilization, home exercise program, functional ROM exercises, flexibility, strengthening, spinal/joint mobilization, soft tissue mobilization, postural training, therapeutic activities, stretching, neuromuscular re-education, body mechanics training and abdominal trunk stabilization  Frequency: 2x week  Duration in visits: 12  Duration in weeks: 12  Treatment plan discussed with: patient          Access Code: [email or web address]  URL: [email or web address]  Date: 02/21/2023  Prepared by: Meli Tomlin    Exercises  Seated Upright Posture Correction - 1 x daily - 7 x weekly - 3 sets - 10  reps  Seated Shoulder Shrug Circles AROM Backward - 1 x daily - 7 x weekly - 3 sets - 10 reps  Seated Scapular Retraction - 1 x daily - 7 x weekly - 3 sets - 10 reps    Patient Education  Office Posture  Lifting Techniques  Household Activities    History # of Personal Factors and/or Comorbidities: MODERATE (1-2)  Examination of Body System(s): # of elements: LOW (1-2)  Clinical Presentation: STABLE   Clinical Decision Making: LOW     Timed:         Manual Therapy:         mins  92801;     Therapeutic Exercise:     10    mins  27685;     Neuromuscular Marino:        mins  67189;    Therapeutic Activity:          mins  02104;     Gait Training:           mins  40271;     Ultrasound:          mins  03197;    Ionto                                   mins   89998  Self Care                      5      mins   43268  Aquatic                               mins 77890      Un-Timed:  Electrical Stimulation:         mins  22664 ( );  Dry Needling          mins self-pay  Traction          mins 32815  Low Eval     30     Mins  34736  Mod Eval          Mins  76955  High Eval                            Mins  01997  Re-Eval                               mins  52956        Timed Treatment:   15   mins   Total Treatment:     45   mins    PT SIGNATURE: Meli Tomlin PT, DPT   IN LICENCE: [id number]  DATE TREATMENT INITIATED: 2/21/23    Initial Certification  Certification Period: 5/21/23  I certify that the therapy services are furnished while this patient is under my care.  The services outlined above are required by this patient, and will be reviewed every 90 days.     PHYSICIAN: Jaye Ogden APRN      DATE:     Please sign and return via fax to [phone].. Thank you, [facility].

## 2023-03-02 ENCOUNTER — TELEPHONE (OUTPATIENT)
Dept: PHYSICAL THERAPY | Facility: CLINIC | Age: 48
End: 2023-03-02

## 2023-03-02 NOTE — TELEPHONE ENCOUNTER
Caller: Grace Clement    Relationship: Self       What was the call regarding: WOKE UP WITH A HEAD COLD

## 2023-03-03 ENCOUNTER — TELEPHONE (OUTPATIENT)
Dept: ONCOLOGY | Facility: CLINIC | Age: 48
End: 2023-03-03

## 2023-03-03 NOTE — TELEPHONE ENCOUNTER
Hub staff attempted to follow warm transfer process and was unsuccessful     Caller: Grace Clement    Relationship to patient: Self    Best call back number: [phone]    Patient is needing: R/S

## 2023-03-07 NOTE — PROGRESS NOTES
HEMATOLOGY ONCOLOGY FOLLOW UP        Patient name: Grace Clement  : 1975  MRN: [id number]  Primary Care Physician: Jaye Ogden APRN  Referring Physician: Jaye Ogden APRN  Reason For Consult:     History of Present Illness:    2022: In the office for initial evaluation. Had anemia for a long time, at least for 3 years. For the most part normocytic. Not clearly progressive and not associated to any symptoms. Complained of fatigue but carried a history of severe chronic obstructive pulmonary disease and was on oxygen for at least 2 years. Also suffered from very severe obesity. Had been limited by her dyspnea and difficulties with standing up for long periods of time. Eating well and no nausea or vomiting. Persistently dyspneic and with some cough and expectoration. No chest pain or dysphagia. No abdominal pain and no diarrhea. Denied melena, hematochezia or hematuria. Had been taking meloxicam regularly. Intermittently taking ibuprofen 400 mg per dose. Also receiving daily low dose acetyl salicylic acid.     2022: Feeling about the same. Eating well and no weight loss. No bleeding. No fever. On exam there was no significant change. The laboratory exams were consistent with iron deficiency. A decision was made to start treatment with oral iron. She was given instructions and prescriptions were sent. She was asked to return in 2 months     3/9/2023: Without much change but feeling somewhat better.  Stronger.  Able to take the iron without side effects.  The physical exam was unchanged.  The laboratory exams revealed very modest improvement in the hemoglobin.  A decision was made to investigate iron again as she may be absorbing the supplement poorly and may justify treatment with intravenous iron.    Subjective:  3/9/2023: Feeling reasonably well.  Not more active than before.  Eating well.  No changes in weight and afebrile.  No chest pains or cough.  No abdominal  pain or diarrhea and no dysuria.  No peripheral edema and no skin rash.    The following portions of the patient's history were reviewed and updated as appropriate: allergies, current medications, past family history, past medical history, past social history, past surgical history and problem list.    Past Medical History:   Diagnosis Date   • Allergic 1994   • Asthma    • COPD (chronic obstructive pulmonary disease) (HCC)    • Hyperlipidemia    • Vitamin D deficiency        Past Surgical History:   Procedure Laterality Date   • CHOLECYSTECTOMY     • COLONOSCOPY  7/20/2022   • CRYOABLATION     • LAPAROSCOPIC TUBAL LIGATION     • TUBAL ABDOMINAL LIGATION  6/16/2005       Current Outpatient Medications:   •  albuterol (ACCUNEB) 0.63 MG/3ML nebulizer solution, Take 3 mL by nebulization Every 6 (Six) Hours As Needed for Wheezing., Disp: 360 mL, Rfl: 3  •  aspirin 81 MG tablet, Take 1 tablet by mouth Daily., Disp: , Rfl:   •  atorvastatin (LIPITOR) 20 MG tablet, Take 1 tablet by mouth Daily., Disp: 90 tablet, Rfl: 1  •  Breztri Aerosphere 160-9-4.8 MCG/ACT aerosol inhaler, , Disp: , Rfl:   •  ferrous gluconate (FERGON) 324 MG tablet, Take 1 tablet by mouth Every Other Day., Disp: 15 tablet, Rfl: 11  •  fexofenadine (ALLEGRA) 60 MG tablet, MUCINEX ALLERGY TABLET, Disp: , Rfl:   •  fluticasone (FLONASE) 50 MCG/ACT nasal spray, 2 sprays into the nostril(s) as directed by provider Daily., Disp: , Rfl:   •  gabapentin (NEURONTIN) 300 MG capsule, Wk1: take 1 tab at bedtime, Wk2: take 1 pill twice a day, Wk3 1 tab tree times a day, Disp: 90 capsule, Rfl: 5  •  medroxyPROGESTERone (PROVERA) 10 MG tablet, , Disp: , Rfl:   •  meloxicam (Mobic) 15 MG tablet, Take 1 tablet by mouth Daily., Disp: 90 tablet, Rfl: 1  •  montelukast (Singulair) 10 MG tablet, Take 1 tablet by mouth Every Night., Disp: 90 tablet, Rfl: 1  •  vitamin D3 125 MCG (5000 UT) capsule capsule, Take 1 capsule by mouth Daily., Disp: , Rfl:   •  albuterol sulfate   (90 Base) MCG/ACT inhaler, Inhale 2 puffs As Needed., Disp: , Rfl:     Allergies   Allergen Reactions   • Codeine Hives, Itching and Rash     Family History   Problem Relation Age of Onset   • Alcohol abuse Mother    • COPD Mother    • Depression Mother    • Vision loss Mother    • Early death Mother    • Alcohol abuse Father    • Early death Father    • Alcohol abuse Sister    • Depression Sister    • Miscarriages / Stillbirths Sister    • Learning disabilities Sister    • Mental illness Sister    • Miscarriages / Stillbirths Sister    • Alcohol abuse Brother    • COPD Brother    • Hyperlipidemia Brother    • Drug abuse Brother    • Early death Brother    • Arthritis Brother    • Drug abuse Brother    • Cancer Maternal Uncle    • Early death Maternal Uncle    • Heart disease Maternal Grandmother    • Early death Maternal Grandmother    • Anxiety disorder Daughter    • Asthma Daughter    • Depression Daughter    • Drug abuse Daughter    • Learning disabilities Daughter    • Mental illness Daughter      Cancer-related family history includes Cancer in her maternal uncle.    Social History     Tobacco Use   • Smoking status: Former     Packs/day: 1.50     Years: 30.00     Pack years: 45.00     Types: Cigarettes     Start date: 3/10/1984     Quit date: 4/27/2019     Years since quitting: 3.8   • Smokeless tobacco: Never   Vaping Use   • Vaping Use: Former   Substance Use Topics   • Alcohol use: No   • Drug use: Not Currently     Social History     Social History Narrative   • Not on file      I have reviewed the history of present illness, past medical history, family history, social history, lab results, all notes and other records since the patient was last seen on  11/10/2022.    ROS:   Review of Systems   Constitutional: Negative for activity change, appetite change, chills, diaphoresis, fatigue, fever and unexpected weight change.        Feeling more energetic.   HENT: Negative for congestion, dental  "problem, drooling, ear discharge, ear pain, facial swelling, hearing loss, mouth sores, nosebleeds, postnasal drip, rhinorrhea, sinus pressure, sinus pain, sneezing, sore throat, tinnitus, trouble swallowing and voice change.    Eyes: Negative for photophobia, pain, discharge, redness, itching and visual disturbance.   Respiratory: Negative for apnea, cough, choking, chest tightness, shortness of breath, wheezing and stridor.    Cardiovascular: Negative for chest pain, palpitations and leg swelling.   Gastrointestinal: Negative for abdominal distention, abdominal pain, anal bleeding, blood in stool, constipation, diarrhea, nausea, rectal pain and vomiting.   Endocrine: Negative for cold intolerance, heat intolerance, polydipsia and polyuria.   Genitourinary: Positive for vaginal bleeding ( Was seen by gynecology.  Continues to bleed but an endometrial biopsy did not suggest malignancy.). Negative for decreased urine volume, difficulty urinating, dysuria, flank pain, frequency, genital sores, hematuria and urgency.   Musculoskeletal: Negative for arthralgias, back pain, gait problem, joint swelling, myalgias, neck pain and neck stiffness.   Skin: Negative for color change, pallor and rash.   Neurological: Negative for dizziness, tremors, seizures, syncope, facial asymmetry, speech difficulty, weakness, light-headedness, numbness and headaches.   Hematological: Negative for adenopathy. Does not bruise/bleed easily.   Psychiatric/Behavioral: Negative for agitation, behavioral problems, confusion, decreased concentration, hallucinations, self-injury, sleep disturbance and suicidal ideas. The patient is not nervous/anxious.      Objective:  Vital signs:  Vitals:    03/09/23 1456   BP: 149/83   Pulse: 83   Temp: 96.9 °F (36.1 °C)   SpO2: 98%  Comment: on O2 2 L via nasal cannula   Weight: (!) 138 kg (305 lb 3.2 oz)   Height: 160 cm (63\")   PainSc: 0-No pain     Body mass index is 54.06 kg/m².  ECOG  (2) Ambulatory and " capable of self care, unable to carry out work activity, up and about > 50% or waking hours    Physical Exam:   Physical Exam  Constitutional:       General: She is not in acute distress.     Appearance: She is ill-appearing. She is not toxic-appearing or diaphoretic.      Comments: Well-built and well oriented.  BMI greater than 54 kg/m².   HENT:      Head: Normocephalic and atraumatic.      Right Ear: External ear normal.      Left Ear: External ear normal.      Nose: Nose normal.      Mouth/Throat:      Mouth: Mucous membranes are moist.      Pharynx: Oropharynx is clear. No oropharyngeal exudate or posterior oropharyngeal erythema.   Eyes:      General: No scleral icterus.        Right eye: No discharge.         Left eye: No discharge.      Conjunctiva/sclera: Conjunctivae normal.      Pupils: Pupils are equal, round, and reactive to light.   Cardiovascular:      Rate and Rhythm: Normal rate and regular rhythm.      Pulses: Normal pulses.      Heart sounds: No murmur heard.    No friction rub. No gallop.   Pulmonary:      Effort: No respiratory distress.      Breath sounds: No stridor. No wheezing, rhonchi or rales.   Abdominal:      General: Abdomen is flat. Bowel sounds are normal. There is no distension.      Palpations: Abdomen is soft. There is no mass.      Tenderness: There is no abdominal tenderness. There is no right CVA tenderness, left CVA tenderness, guarding or rebound.      Hernia: No hernia is present.   Musculoskeletal:         General: No swelling, tenderness, deformity or signs of injury.      Cervical back: No rigidity.      Right lower leg: No edema.      Left lower leg: No edema.   Lymphadenopathy:      Cervical: No cervical adenopathy.   Skin:     Coloration: Skin is not jaundiced.      Findings: No bruising, lesion or rash.   Neurological:      General: No focal deficit present.      Mental Status: She is alert and oriented to person, place, and time.      Cranial Nerves: No cranial nerve  deficit.      Motor: No weakness.      Gait: Gait normal.   Psychiatric:         Mood and Affect: Mood normal.         Behavior: Behavior normal.         Thought Content: Thought content normal.         Judgment: Judgment normal.     SHAYAN Resendiz MD performed the physical exam on 3/9/2023 as documented above.    Lab Results - Last 18 Months   Lab Units 03/09/23  1450 12/30/22  1016 12/02/22  1302   WBC 10*3/mm3 6.16 5.31 5.01   HEMOGLOBIN g/dL 10.5* 10.0* 10.3*   HEMATOCRIT % 33.7* 32.0* 32.2*   PLATELETS 10*3/mm3 245 215 232   MCV fL 91.6 89.9 88.7     Lab Results - Last 18 Months   Lab Units 03/09/23  1450 12/02/22  1358 10/28/22  1521   SODIUM mmol/L 142 141 138   POTASSIUM mmol/L 4.3 4.3 3.5   CHLORIDE mmol/L 105 103 101   CO2 mmol/L 25.0 28.0 28.6   BUN mg/dL 17 9 10   CREATININE mg/dL 0.90 0.81 0.87   CALCIUM mg/dL 9.2 9.2 9.0   BILIRUBIN mg/dL 1.0 0.8 0.9   ALK PHOS U/L 73 80 69   ALT (SGPT) U/L 17 13 14   AST (SGOT) U/L 19 16 20   GLUCOSE mg/dL 105* 99 80     Lab Results   Component Value Date    GLUCOSE 105 (H) 03/09/2023    BUN 17 03/09/2023    CREATININE 0.90 03/09/2023    EGFRIFNONA 88 04/03/2020    BCR 18.9 03/09/2023    K 4.3 03/09/2023    CO2 25.0 03/09/2023    CALCIUM 9.2 03/09/2023    PROTENTOTREF 6.6 10/28/2022    ALBUMIN 4.0 03/09/2023    LABIL2 1.2 10/28/2022    AST 19 03/09/2023    ALT 17 03/09/2023     Lab Results   Component Value Date    IRON 69 03/09/2023    TIBC 428 03/09/2023    FERRITIN 36.84 03/09/2023     Lab Results   Component Value Date    FOLATE 7.97 10/28/2022     Lab Results   Component Value Date    TTFPHBWX37 669 12/02/2022     Lab Results   Component Value Date    SPEP Comment 10/28/2022     Lab Results   Component Value Date    PHILIPPE Positive (A) 10/28/2022    SEDRATE 11 10/28/2022     Lab Results   Component Value Date    PTT 19.3 (L) 04/04/2019    INR 0.9 04/04/2019     Lab Results   Component Value Date    SEDRATE 11 10/28/2022     Assessment & Plan        Assessment:  1. Iron deficiency anemia: Not particularly better.  We will recheck.  For now continue to take iron.  Discussed with her.  2. Postmenopausal vaginal bleeding: We will continue to observe.  She reported evaluation by gynecology and no suggestion of malignancy.  3. BMI greater than 54 kg/m².  4. She will see me again in approximately 6 weeks.  To obtain iron and ferritin today.    Plan:  1. As above.    Oleksandr Resendiz MD on 3/9/2023 at 1636

## 2023-03-09 ENCOUNTER — OFFICE VISIT (OUTPATIENT)
Dept: ONCOLOGY | Facility: CLINIC | Age: 48
End: 2023-03-09
Payer: COMMERCIAL

## 2023-03-09 ENCOUNTER — LAB (OUTPATIENT)
Dept: LAB | Facility: HOSPITAL | Age: 48
End: 2023-03-09
Payer: COMMERCIAL

## 2023-03-09 VITALS
TEMPERATURE: 96.9 F | BODY MASS INDEX: 51.91 KG/M2 | HEART RATE: 83 BPM | HEIGHT: 63 IN | DIASTOLIC BLOOD PRESSURE: 83 MMHG | SYSTOLIC BLOOD PRESSURE: 149 MMHG | OXYGEN SATURATION: 98 % | WEIGHT: 293 LBS

## 2023-03-09 DIAGNOSIS — E61.1 IRON DEFICIENCY: Primary | ICD-10-CM

## 2023-03-09 LAB
ALBUMIN SERPL-MCNC: 4 G/DL (ref 3.5–5.2)
ALBUMIN/GLOB SERPL: 1.4 G/DL
ALP SERPL-CCNC: 73 U/L (ref 39–117)
ALT SERPL W P-5'-P-CCNC: 17 U/L (ref 1–33)
ANION GAP SERPL CALCULATED.3IONS-SCNC: 12 MMOL/L (ref 5–15)
AST SERPL-CCNC: 19 U/L (ref 1–32)
BASOPHILS # BLD AUTO: 0.01 10*3/MM3 (ref 0–0.2)
BASOPHILS NFR BLD AUTO: 0.2 % (ref 0–1.5)
BILIRUB SERPL-MCNC: 1 MG/DL (ref 0–1.2)
BUN SERPL-MCNC: 17 MG/DL (ref 6–20)
BUN/CREAT SERPL: 18.9 (ref 7–25)
CALCIUM SPEC-SCNC: 9.2 MG/DL (ref 8.6–10.5)
CHLORIDE SERPL-SCNC: 105 MMOL/L (ref 98–107)
CO2 SERPL-SCNC: 25 MMOL/L (ref 22–29)
CREAT SERPL-MCNC: 0.9 MG/DL (ref 0.57–1)
DEPRECATED RDW RBC AUTO: 51.4 FL (ref 37–54)
EGFRCR SERPLBLD CKD-EPI 2021: 78 ML/MIN/1.73
EOSINOPHIL # BLD AUTO: 0.18 10*3/MM3 (ref 0–0.4)
EOSINOPHIL NFR BLD AUTO: 2.9 % (ref 0.3–6.2)
ERYTHROCYTE [DISTWIDTH] IN BLOOD BY AUTOMATED COUNT: 15.8 % (ref 12.3–15.4)
FERRITIN SERPL-MCNC: 36.84 NG/ML (ref 13–150)
GLOBULIN UR ELPH-MCNC: 2.8 GM/DL
GLUCOSE SERPL-MCNC: 105 MG/DL (ref 65–99)
HCT VFR BLD AUTO: 33.7 % (ref 34–46.6)
HGB BLD-MCNC: 10.5 G/DL (ref 12–15.9)
HOLD SPECIMEN: NORMAL
IRON 24H UR-MRATE: 69 MCG/DL (ref 37–145)
IRON SATN MFR SERPL: 16 % (ref 20–50)
LYMPHOCYTES # BLD AUTO: 1.31 10*3/MM3 (ref 0.7–3.1)
LYMPHOCYTES NFR BLD AUTO: 21.3 % (ref 19.6–45.3)
MCH RBC QN AUTO: 28.5 PG (ref 26.6–33)
MCHC RBC AUTO-ENTMCNC: 31.2 G/DL (ref 31.5–35.7)
MCV RBC AUTO: 91.6 FL (ref 79–97)
MONOCYTES # BLD AUTO: 0.4 10*3/MM3 (ref 0.1–0.9)
MONOCYTES NFR BLD AUTO: 6.5 % (ref 5–12)
NEUTROPHILS NFR BLD AUTO: 4.26 10*3/MM3 (ref 1.7–7)
NEUTROPHILS NFR BLD AUTO: 69.1 % (ref 42.7–76)
PLATELET # BLD AUTO: 245 10*3/MM3 (ref 140–450)
PMV BLD AUTO: 8.7 FL (ref 6–12)
POTASSIUM SERPL-SCNC: 4.3 MMOL/L (ref 3.5–5.2)
PROT SERPL-MCNC: 6.8 G/DL (ref 6–8.5)
RBC # BLD AUTO: 3.68 10*6/MM3 (ref 3.77–5.28)
SODIUM SERPL-SCNC: 142 MMOL/L (ref 136–145)
TIBC SERPL-MCNC: 428 MCG/DL (ref 298–536)
TRANSFERRIN SERPL-MCNC: 287 MG/DL (ref 200–360)
WBC NRBC COR # BLD: 6.16 10*3/MM3 (ref 3.4–10.8)

## 2023-03-09 PROCEDURE — 84466 ASSAY OF TRANSFERRIN: CPT | Performed by: INTERNAL MEDICINE

## 2023-03-09 PROCEDURE — 80053 COMPREHEN METABOLIC PANEL: CPT | Performed by: INTERNAL MEDICINE

## 2023-03-09 PROCEDURE — 83540 ASSAY OF IRON: CPT | Performed by: INTERNAL MEDICINE

## 2023-03-09 PROCEDURE — 82728 ASSAY OF FERRITIN: CPT | Performed by: INTERNAL MEDICINE

## 2023-03-09 PROCEDURE — 99213 OFFICE O/P EST LOW 20 MIN: CPT | Performed by: INTERNAL MEDICINE

## 2023-03-09 PROCEDURE — 36415 COLL VENOUS BLD VENIPUNCTURE: CPT

## 2023-03-09 PROCEDURE — 85025 COMPLETE CBC W/AUTO DIFF WBC: CPT

## 2023-03-09 RX ORDER — MEDROXYPROGESTERONE ACETATE 10 MG/1
TABLET ORAL
COMMUNITY
Start: 2023-02-20

## 2023-03-10 ENCOUNTER — TELEPHONE (OUTPATIENT)
Dept: PHYSICAL THERAPY | Facility: CLINIC | Age: 48
End: 2023-03-10

## 2023-03-24 DIAGNOSIS — J44.9 CHRONIC OBSTRUCTIVE PULMONARY DISEASE, UNSPECIFIED COPD TYPE: ICD-10-CM

## 2023-03-27 RX ORDER — MONTELUKAST SODIUM 10 MG/1
TABLET ORAL
Qty: 90 TABLET | Refills: 1 | Status: SHIPPED | OUTPATIENT
Start: 2023-03-27

## 2023-03-27 RX ORDER — ATORVASTATIN CALCIUM 20 MG/1
TABLET, FILM COATED ORAL
Qty: 90 TABLET | Refills: 1 | Status: SHIPPED | OUTPATIENT
Start: 2023-03-27

## 2023-04-10 ENCOUNTER — DOCUMENTATION (OUTPATIENT)
Dept: PHYSICAL THERAPY | Facility: CLINIC | Age: 48
End: 2023-04-10
Payer: COMMERCIAL

## 2023-04-10 NOTE — LETTER
Discharge Summary  Discharge Summary from Physical Therapy Report    Grace Clement  [birth year]    Dates  PT visit:2/21/23   Number of Visits: 1    Discharge Status of Patient: Pt present for eval only. She had to cancel due to illness. Pt decided to cancel remaining visits due to busy schedule, will continue HEP at this time.     Goals: Not Met    Discharge Plan: Continue with current home exercise program as instructed  Future need for rehabilitation activities  Patient to return to referring/providing physician      Date of Discharge 4/10/23        Meli Tomlin, PT  Physical Therapist

## 2023-04-18 NOTE — PROGRESS NOTES
HEMATOLOGY ONCOLOGY FOLLOW UP        Patient name: Grace Clement  : 1975  MRN: [id number]  Primary Care Physician: Jaye Ogden APRN  Referring Physician: Jaye Ogden APRN  Reason For Consult:     History of Present Illness:    2022: In the office for initial evaluation. Had anemia for a long time, at least for 3 years. For the most part normocytic. Not clearly progressive and not associated to any symptoms. Complained of fatigue but carried a history of severe chronic obstructive pulmonary disease and was on oxygen for at least 2 years. Also suffered from very severe obesity. Had been limited by her dyspnea and difficulties with standing up for long periods of time. Eating well and no nausea or vomiting. Persistently dyspneic and with some cough and expectoration. No chest pain or dysphagia. No abdominal pain and no diarrhea. Denied melena, hematochezia or hematuria. Had been taking meloxicam regularly. Intermittently taking ibuprofen 400 mg per dose. Also receiving daily low dose acetyl salicylic acid.     2022: Feeling about the same. Eating well and no weight loss. No bleeding. No fever. On exam there was no significant change. The laboratory exams were consistent with iron deficiency. A decision was made to start treatment with oral iron. She was given instructions and prescriptions were sent. She was asked to return in 2 months     3/9/2023: Without much change but feeling somewhat better.  Stronger.  Able to take the iron without side effects.  The physical exam was unchanged.  The laboratory exams revealed very modest improvement in the hemoglobin.  A decision was made to investigate iron again as she may be absorbing the supplement poorly and may justify treatment with intravenous iron.    2023: Feeling about the same as before. Still having some fatigue. Went back to school and is able to fulfill her duties. Eating as much as before and no weight  loss. Denied increasing dyspnea though still needing oxygen. No melena or hematochezia. Able to take the iron every other day without difficulties and taking it compliantly. The exam without significant changes. BMI greater than 50 kg/m2. The laboratory exams were reviewed and discussed with her. Persisted with anemia of the same degree as before. The iron had increased some but her total iron binding capacity remained subnormal. A decision was made to treat her with intravenous iron. Side effects and potential complications were described and the measures taken to reduce risk were explained. She was interested in proceeding.     Subjective:  4/21/2023: Feeling about the same. Fatigued. Afebrile. No weight loss. Sleeping well. No more dyspnea than usual. No chest pain or cough and no abdominal pain or diarrhea. No dysuria. No longer having vaginal bleeding.     The following portions of the patient's history were reviewed and updated as appropriate: allergies, current medications, past family history, past medical history, past social history, past surgical history and problem list.    Past Medical History:   Diagnosis Date   • Allergic 1994   • Asthma    • COPD (chronic obstructive pulmonary disease)    • Hyperlipidemia    • Vitamin D deficiency        Past Surgical History:   Procedure Laterality Date   • CHOLECYSTECTOMY     • COLONOSCOPY  7/20/2022   • CRYOABLATION     • LAPAROSCOPIC TUBAL LIGATION     • TUBAL ABDOMINAL LIGATION  6/16/2005       Current Outpatient Medications:   •  albuterol (ACCUNEB) 0.63 MG/3ML nebulizer solution, Take 3 mL by nebulization Every 6 (Six) Hours As Needed for Wheezing., Disp: 360 mL, Rfl: 3  •  aspirin 81 MG tablet, Take 1 tablet by mouth Daily., Disp: , Rfl:   •  atorvastatin (LIPITOR) 20 MG tablet, TAKE ONE TABLET BY MOUTH DAILY, Disp: 90 tablet, Rfl: 1  •  Breztri Aerosphere 160-9-4.8 MCG/ACT aerosol inhaler, , Disp: , Rfl:   •  ferrous gluconate (FERGON) 324 MG tablet, Take 1  tablet by mouth Every Other Day., Disp: 15 tablet, Rfl: 11  •  fexofenadine (ALLEGRA) 60 MG tablet, MUCINEX ALLERGY TABLET, Disp: , Rfl:   •  fluticasone (FLONASE) 50 MCG/ACT nasal spray, 2 sprays into the nostril(s) as directed by provider Daily., Disp: , Rfl:   •  gabapentin (NEURONTIN) 300 MG capsule, Wk1: take 1 tab at bedtime, Wk2: take 1 pill twice a day, Wk3 1 tab tree times a day, Disp: 90 capsule, Rfl: 5  •  medroxyPROGESTERone (PROVERA) 10 MG tablet, , Disp: , Rfl:   •  meloxicam (Mobic) 15 MG tablet, Take 1 tablet by mouth Daily., Disp: 90 tablet, Rfl: 1  •  montelukast (SINGULAIR) 10 MG tablet, TAKE ONE TABLET BY MOUTH ONCE NIGHTLY, Disp: 90 tablet, Rfl: 1  •  vitamin D3 125 MCG (5000 UT) capsule capsule, Take 1 capsule by mouth Daily., Disp: , Rfl:   •  albuterol sulfate  (90 Base) MCG/ACT inhaler, Inhale 2 puffs As Needed., Disp: , Rfl:     Allergies   Allergen Reactions   • Codeine Hives, Itching and Rash     Family History   Problem Relation Age of Onset   • Alcohol abuse Mother    • COPD Mother    • Depression Mother    • Vision loss Mother    • Early death Mother    • Alcohol abuse Father    • Early death Father    • Alcohol abuse Sister    • Depression Sister    • Miscarriages / Stillbirths Sister    • Learning disabilities Sister    • Mental illness Sister    • Miscarriages / Stillbirths Sister    • Alcohol abuse Brother    • COPD Brother    • Hyperlipidemia Brother    • Drug abuse Brother    • Early death Brother    • Arthritis Brother    • Drug abuse Brother    • Cancer Maternal Uncle    • Early death Maternal Uncle    • Heart disease Maternal Grandmother    • Early death Maternal Grandmother    • Anxiety disorder Daughter    • Asthma Daughter    • Depression Daughter    • Drug abuse Daughter    • Learning disabilities Daughter    • Mental illness Daughter      Cancer-related family history includes Cancer in her maternal uncle.    Social History     Tobacco Use   • Smoking status:  Former     Packs/day: 1.50     Years: 30.00     Pack years: 45.00     Types: Cigarettes     Start date: 3/10/1984     Quit date: 4/27/2019     Years since quitting: 3.9   • Smokeless tobacco: Never   Vaping Use   • Vaping Use: Former   Substance Use Topics   • Alcohol use: No   • Drug use: Not Currently     Social History     Social History Narrative   • Not on file      I have reviewed the history of present illness, past medical history, family history, social history, lab results, all notes and other records since the patient was last seen on  11/10/2022.    ROS:   Review of Systems   Constitutional: Negative for activity change, appetite change, chills, diaphoresis, fatigue, fever and unexpected weight change.        Feeling more energetic.   HENT: Negative for congestion, dental problem, drooling, ear discharge, ear pain, facial swelling, hearing loss, mouth sores, nosebleeds, postnasal drip, rhinorrhea, sinus pressure, sinus pain, sneezing, sore throat, tinnitus, trouble swallowing and voice change.    Eyes: Negative for photophobia, pain, discharge, redness, itching and visual disturbance.   Respiratory: Negative for apnea, cough, choking, chest tightness, shortness of breath, wheezing and stridor.    Cardiovascular: Negative for chest pain, palpitations and leg swelling.   Gastrointestinal: Negative for abdominal distention, abdominal pain, anal bleeding, blood in stool, constipation, diarrhea, nausea, rectal pain and vomiting.   Endocrine: Negative for cold intolerance, heat intolerance, polydipsia and polyuria.   Genitourinary: Negative for decreased urine volume, difficulty urinating, dysuria, flank pain, frequency, genital sores, hematuria, urgency and vaginal bleeding.   Musculoskeletal: Negative for arthralgias, back pain, gait problem, joint swelling, myalgias, neck pain and neck stiffness.   Skin: Negative for color change, pallor and rash.   Neurological: Negative for dizziness, tremors, seizures,  "syncope, facial asymmetry, speech difficulty, weakness, light-headedness, numbness and headaches.   Hematological: Negative for adenopathy. Does not bruise/bleed easily.   Psychiatric/Behavioral: Negative for agitation, behavioral problems, confusion, decreased concentration, hallucinations, self-injury, sleep disturbance and suicidal ideas. The patient is not nervous/anxious.      Objective:  Vital signs:  Vitals:    04/21/23 1049   BP: 125/74   Pulse: 84   Temp: 98.2 °F (36.8 °C)   TempSrc: Oral   SpO2: 99%  Comment: with oxygen on   Weight: (!) 138 kg (304 lb 6.4 oz)   Height: 160 cm (63\")   PainSc: 0-No pain     Body mass index is 53.92 kg/m².  ECOG  (2) Ambulatory and capable of self care, unable to carry out work activity, up and about > 50% or waking hours    Physical Exam:   Physical Exam  Constitutional:       General: She is not in acute distress.     Appearance: She is ill-appearing. She is not toxic-appearing or diaphoretic.      Comments: Chronically ill appearing.  BMI greater than 54 kg/m².   HENT:      Head: Normocephalic and atraumatic.      Right Ear: External ear normal.      Left Ear: External ear normal.      Nose: Nose normal.      Mouth/Throat:      Mouth: Mucous membranes are moist.      Pharynx: Oropharynx is clear. No oropharyngeal exudate or posterior oropharyngeal erythema.   Eyes:      General: No scleral icterus.        Right eye: No discharge.         Left eye: No discharge.      Conjunctiva/sclera: Conjunctivae normal.      Pupils: Pupils are equal, round, and reactive to light.   Cardiovascular:      Rate and Rhythm: Normal rate and regular rhythm.      Pulses: Normal pulses.      Heart sounds: No murmur heard.    No friction rub. No gallop.   Pulmonary:      Effort: No respiratory distress.      Breath sounds: No stridor. No wheezing, rhonchi or rales.   Abdominal:      General: Abdomen is flat. Bowel sounds are normal. There is no distension.      Palpations: Abdomen is soft. There " is no mass.      Tenderness: There is no abdominal tenderness. There is no right CVA tenderness, left CVA tenderness, guarding or rebound.      Hernia: No hernia is present.   Musculoskeletal:         General: No swelling, tenderness, deformity or signs of injury.      Cervical back: No rigidity.      Right lower leg: No edema.      Left lower leg: No edema.   Lymphadenopathy:      Cervical: No cervical adenopathy.   Skin:     Coloration: Skin is not jaundiced.      Findings: No bruising, lesion or rash.   Neurological:      General: No focal deficit present.      Mental Status: She is alert and oriented to person, place, and time.      Cranial Nerves: No cranial nerve deficit.      Motor: No weakness.      Gait: Gait normal.   Psychiatric:         Mood and Affect: Mood normal.         Behavior: Behavior normal.         Thought Content: Thought content normal.         Judgment: Judgment normal.     SHAYAN Resendiz MD performed the physical exam on 4/21/2023 as documented above.    Lab Results - Last 18 Months   Lab Units 04/21/23  1054 03/09/23  1450 12/30/22  1016   WBC 10*3/mm3 6.19 6.16 5.31   HEMOGLOBIN g/dL 10.5* 10.5* 10.0*   HEMATOCRIT % 33.5* 33.7* 32.0*   PLATELETS 10*3/mm3 247 245 215   MCV fL 90.3 91.6 89.9     Lab Results - Last 18 Months   Lab Units 03/09/23  1450 12/02/22  1358 10/28/22  1521   SODIUM mmol/L 142 141 138   POTASSIUM mmol/L 4.3 4.3 3.5   CHLORIDE mmol/L 105 103 101   CO2 mmol/L 25.0 28.0 28.6   BUN mg/dL 17 9 10   CREATININE mg/dL 0.90 0.81 0.87   CALCIUM mg/dL 9.2 9.2 9.0   BILIRUBIN mg/dL 1.0 0.8 0.9   ALK PHOS U/L 73 80 69   ALT (SGPT) U/L 17 13 14   AST (SGOT) U/L 19 16 20   GLUCOSE mg/dL 105* 99 80     Lab Results   Component Value Date    GLUCOSE 105 (H) 03/09/2023    BUN 17 03/09/2023    CREATININE 0.90 03/09/2023    EGFRIFNONA 88 04/03/2020    BCR 18.9 03/09/2023    K 4.3 03/09/2023    CO2 25.0 03/09/2023    CALCIUM 9.2 03/09/2023    PROTENTOTREF 6.6 10/28/2022    ALBUMIN 4.0  03/09/2023    LABIL2 1.2 10/28/2022    AST 19 03/09/2023    ALT 17 03/09/2023     Lab Results   Component Value Date    IRON 69 03/09/2023    TIBC 428 03/09/2023    FERRITIN 36.84 03/09/2023     Lab Results   Component Value Date    FOLATE 7.97 10/28/2022     Lab Results   Component Value Date    SSHJFCFL78 669 12/02/2022     Lab Results   Component Value Date    SPEP Comment 10/28/2022     Lab Results   Component Value Date    PHILIPPE Positive (A) 10/28/2022    SEDRATE 11 10/28/2022     Lab Results   Component Value Date    PTT 19.3 (L) 04/04/2019    INR 0.9 04/04/2019     Lab Results   Component Value Date    SEDRATE 11 10/28/2022     Assessment & Plan     Assessment:  1. Iron deficiency anemia: Not improved after a reasonable period of oral iron. Will proceed with intravenous iron. Discussed with her the side effects and the potential complications. Treatment plan in place.  2. Postmenopausal vaginal bleeding: Resolved at this time. No suggestion of malignancy  3. BMI greater than 54 Kg/m2  4. She will return to see me in approximately 8 weeks.     Plan:  1. As above.     Oleksandr Resendiz MD on 4/21/2023 at 12:35 PM.

## 2023-04-21 ENCOUNTER — LAB (OUTPATIENT)
Dept: LAB | Facility: HOSPITAL | Age: 48
End: 2023-04-21
Payer: COMMERCIAL

## 2023-04-21 ENCOUNTER — OFFICE VISIT (OUTPATIENT)
Dept: ONCOLOGY | Facility: CLINIC | Age: 48
End: 2023-04-21
Payer: COMMERCIAL

## 2023-04-21 VITALS
DIASTOLIC BLOOD PRESSURE: 74 MMHG | WEIGHT: 293 LBS | OXYGEN SATURATION: 99 % | BODY MASS INDEX: 51.91 KG/M2 | HEIGHT: 63 IN | TEMPERATURE: 98.2 F | HEART RATE: 84 BPM | SYSTOLIC BLOOD PRESSURE: 125 MMHG

## 2023-04-21 DIAGNOSIS — E61.1 IRON DEFICIENCY: Primary | ICD-10-CM

## 2023-04-21 PROBLEM — K90.49 MALABSORPTION DUE TO INTOLERANCE, NOT ELSEWHERE CLASSIFIED: Status: ACTIVE | Noted: 2023-04-21

## 2023-04-21 LAB
ALBUMIN SERPL-MCNC: 4.5 G/DL (ref 3.5–5.2)
ALBUMIN/GLOB SERPL: 2 G/DL
ALP SERPL-CCNC: 68 U/L (ref 39–117)
ALT SERPL W P-5'-P-CCNC: 12 U/L (ref 1–33)
ANION GAP SERPL CALCULATED.3IONS-SCNC: 12 MMOL/L (ref 5–15)
AST SERPL-CCNC: 17 U/L (ref 1–32)
BASOPHILS # BLD AUTO: 0.01 10*3/MM3 (ref 0–0.2)
BASOPHILS NFR BLD AUTO: 0.2 % (ref 0–1.5)
BILIRUB SERPL-MCNC: 1.1 MG/DL (ref 0–1.2)
BUN SERPL-MCNC: 9 MG/DL (ref 6–20)
BUN/CREAT SERPL: 10.2 (ref 7–25)
CALCIUM SPEC-SCNC: 9 MG/DL (ref 8.6–10.5)
CHLORIDE SERPL-SCNC: 105 MMOL/L (ref 98–107)
CO2 SERPL-SCNC: 24 MMOL/L (ref 22–29)
CREAT SERPL-MCNC: 0.88 MG/DL (ref 0.57–1)
DEPRECATED RDW RBC AUTO: 47.6 FL (ref 37–54)
EGFRCR SERPLBLD CKD-EPI 2021: 80.2 ML/MIN/1.73
EOSINOPHIL # BLD AUTO: 0.16 10*3/MM3 (ref 0–0.4)
EOSINOPHIL NFR BLD AUTO: 2.6 % (ref 0.3–6.2)
ERYTHROCYTE [DISTWIDTH] IN BLOOD BY AUTOMATED COUNT: 15 % (ref 12.3–15.4)
FERRITIN SERPL-MCNC: 48.21 NG/ML (ref 13–150)
GLOBULIN UR ELPH-MCNC: 2.2 GM/DL
GLUCOSE SERPL-MCNC: 113 MG/DL (ref 65–99)
HCT VFR BLD AUTO: 33.5 % (ref 34–46.6)
HGB BLD-MCNC: 10.5 G/DL (ref 12–15.9)
HOLD SPECIMEN: NORMAL
IRON 24H UR-MRATE: 66 MCG/DL (ref 37–145)
IRON SATN MFR SERPL: 17 % (ref 20–50)
LYMPHOCYTES # BLD AUTO: 1.28 10*3/MM3 (ref 0.7–3.1)
LYMPHOCYTES NFR BLD AUTO: 20.7 % (ref 19.6–45.3)
MCH RBC QN AUTO: 28.3 PG (ref 26.6–33)
MCHC RBC AUTO-ENTMCNC: 31.3 G/DL (ref 31.5–35.7)
MCV RBC AUTO: 90.3 FL (ref 79–97)
MONOCYTES # BLD AUTO: 0.33 10*3/MM3 (ref 0.1–0.9)
MONOCYTES NFR BLD AUTO: 5.3 % (ref 5–12)
NEUTROPHILS NFR BLD AUTO: 4.41 10*3/MM3 (ref 1.7–7)
NEUTROPHILS NFR BLD AUTO: 71.2 % (ref 42.7–76)
PLATELET # BLD AUTO: 247 10*3/MM3 (ref 140–450)
PMV BLD AUTO: 8.6 FL (ref 6–12)
POTASSIUM SERPL-SCNC: 4 MMOL/L (ref 3.5–5.2)
PROT SERPL-MCNC: 6.7 G/DL (ref 6–8.5)
RBC # BLD AUTO: 3.71 10*6/MM3 (ref 3.77–5.28)
SODIUM SERPL-SCNC: 141 MMOL/L (ref 136–145)
TIBC SERPL-MCNC: 384 MCG/DL (ref 298–536)
TRANSFERRIN SERPL-MCNC: 258 MG/DL (ref 200–360)
WBC NRBC COR # BLD: 6.19 10*3/MM3 (ref 3.4–10.8)

## 2023-04-21 PROCEDURE — 83540 ASSAY OF IRON: CPT | Performed by: INTERNAL MEDICINE

## 2023-04-21 PROCEDURE — 85025 COMPLETE CBC W/AUTO DIFF WBC: CPT

## 2023-04-21 PROCEDURE — 82728 ASSAY OF FERRITIN: CPT | Performed by: INTERNAL MEDICINE

## 2023-04-21 PROCEDURE — 84466 ASSAY OF TRANSFERRIN: CPT | Performed by: INTERNAL MEDICINE

## 2023-04-21 PROCEDURE — 36415 COLL VENOUS BLD VENIPUNCTURE: CPT

## 2023-04-21 PROCEDURE — 80053 COMPREHEN METABOLIC PANEL: CPT | Performed by: INTERNAL MEDICINE

## 2023-04-28 ENCOUNTER — HOSPITAL ENCOUNTER (OUTPATIENT)
Dept: ONCOLOGY | Facility: HOSPITAL | Age: 48
Discharge: HOME | End: 2023-04-28
Payer: COMMERCIAL

## 2023-04-28 VITALS
BODY MASS INDEX: 51.91 KG/M2 | WEIGHT: 293 LBS | SYSTOLIC BLOOD PRESSURE: 111 MMHG | RESPIRATION RATE: 20 BRPM | DIASTOLIC BLOOD PRESSURE: 71 MMHG | HEIGHT: 63 IN | OXYGEN SATURATION: 96 % | TEMPERATURE: 97.8 F | HEART RATE: 108 BPM

## 2023-04-28 DIAGNOSIS — K90.49 MALABSORPTION DUE TO INTOLERANCE, NOT ELSEWHERE CLASSIFIED: Primary | ICD-10-CM

## 2023-04-28 DIAGNOSIS — E61.1 IRON DEFICIENCY: ICD-10-CM

## 2023-04-28 PROCEDURE — 96375 TX/PRO/DX INJ NEW DRUG ADDON: CPT

## 2023-04-28 PROCEDURE — 96374 THER/PROPH/DIAG INJ IV PUSH: CPT

## 2023-04-28 PROCEDURE — 25010000002 IRON SUCROSE PER 1 MG: Performed by: INTERNAL MEDICINE

## 2023-04-28 RX ORDER — SODIUM CHLORIDE 9 MG/ML
250 INJECTION, SOLUTION INTRAVENOUS ONCE
Status: COMPLETED | OUTPATIENT
Start: 2023-04-28 | End: 2023-04-28

## 2023-04-28 RX ADMIN — IRON SUCROSE 200 MG: 20 INJECTION, SOLUTION INTRAVENOUS at 10:41

## 2023-04-28 RX ADMIN — SODIUM CHLORIDE 250 ML: 9 INJECTION, SOLUTION INTRAVENOUS at 10:41

## 2023-04-28 NOTE — PROGRESS NOTES
Patient is here for C1D1 venofer 200mg. PIV started with + blood return and no issues when removed. Patient had no complaints on arrival. Patient's BP did drop after the 30min wait to low 90's then high 90's- kept patient on fluids and monitored a 19 extra minutes-rechecked BP and it came up to 111/71-Patient stated she felt fine and had no complaints at discharge. AVS given.

## 2023-05-05 ENCOUNTER — HOSPITAL ENCOUNTER (OUTPATIENT)
Dept: ONCOLOGY | Facility: HOSPITAL | Age: 48
Discharge: HOME | End: 2023-05-05
Payer: COMMERCIAL

## 2023-05-05 VITALS
DIASTOLIC BLOOD PRESSURE: 74 MMHG | SYSTOLIC BLOOD PRESSURE: 132 MMHG | HEART RATE: 75 BPM | WEIGHT: 293 LBS | BODY MASS INDEX: 54.91 KG/M2 | TEMPERATURE: 98.2 F

## 2023-05-05 DIAGNOSIS — K90.49 MALABSORPTION DUE TO INTOLERANCE, NOT ELSEWHERE CLASSIFIED: Primary | ICD-10-CM

## 2023-05-05 DIAGNOSIS — E61.1 IRON DEFICIENCY: ICD-10-CM

## 2023-05-05 PROCEDURE — 25010000002 IRON SUCROSE PER 1 MG: Performed by: INTERNAL MEDICINE

## 2023-05-05 PROCEDURE — 96365 THER/PROPH/DIAG IV INF INIT: CPT

## 2023-05-05 PROCEDURE — 96374 THER/PROPH/DIAG INJ IV PUSH: CPT

## 2023-05-05 RX ORDER — SODIUM CHLORIDE 9 MG/ML
250 INJECTION, SOLUTION INTRAVENOUS ONCE
Status: COMPLETED | OUTPATIENT
Start: 2023-05-05 | End: 2023-05-05

## 2023-05-05 RX ADMIN — SODIUM CHLORIDE 250 ML: 9 INJECTION, SOLUTION INTRAVENOUS at 11:38

## 2023-05-05 RX ADMIN — IRON SUCROSE 200 MG: 20 INJECTION, SOLUTION INTRAVENOUS at 11:38

## 2023-05-10 DIAGNOSIS — M79.642 PAIN IN BOTH HANDS: ICD-10-CM

## 2023-05-10 DIAGNOSIS — M79.641 PAIN IN BOTH HANDS: ICD-10-CM

## 2023-05-11 RX ORDER — MELOXICAM 15 MG/1
TABLET ORAL
Qty: 90 TABLET | Refills: 1 | Status: SHIPPED | OUTPATIENT
Start: 2023-05-11

## 2023-05-12 ENCOUNTER — HOSPITAL ENCOUNTER (OUTPATIENT)
Dept: ONCOLOGY | Facility: HOSPITAL | Age: 48
Discharge: HOME | End: 2023-05-12
Payer: COMMERCIAL

## 2023-05-12 VITALS
SYSTOLIC BLOOD PRESSURE: 121 MMHG | OXYGEN SATURATION: 96 % | WEIGHT: 293 LBS | TEMPERATURE: 98 F | BODY MASS INDEX: 51.91 KG/M2 | DIASTOLIC BLOOD PRESSURE: 78 MMHG | RESPIRATION RATE: 16 BRPM | HEIGHT: 63 IN | HEART RATE: 73 BPM

## 2023-05-12 DIAGNOSIS — E61.1 IRON DEFICIENCY: ICD-10-CM

## 2023-05-12 DIAGNOSIS — K90.49 MALABSORPTION DUE TO INTOLERANCE, NOT ELSEWHERE CLASSIFIED: Primary | ICD-10-CM

## 2023-05-12 PROCEDURE — 96374 THER/PROPH/DIAG INJ IV PUSH: CPT

## 2023-05-12 PROCEDURE — 25010000002 IRON SUCROSE PER 1 MG: Performed by: INTERNAL MEDICINE

## 2023-05-12 PROCEDURE — 96365 THER/PROPH/DIAG IV INF INIT: CPT

## 2023-05-12 RX ORDER — SODIUM CHLORIDE 9 MG/ML
250 INJECTION, SOLUTION INTRAVENOUS ONCE
Status: COMPLETED | OUTPATIENT
Start: 2023-05-12 | End: 2023-05-12

## 2023-05-12 RX ADMIN — IRON SUCROSE 200 MG: 20 INJECTION, SOLUTION INTRAVENOUS at 11:15

## 2023-05-12 RX ADMIN — SODIUM CHLORIDE 250 ML: 9 INJECTION, SOLUTION INTRAVENOUS at 11:14

## 2023-05-12 NOTE — PROGRESS NOTES
Patient here C3 Venofer 200mg. PIV started without issue. Patient tolerated treatment. Declined to wait the 30 minute observation time.

## 2023-05-19 ENCOUNTER — HOSPITAL ENCOUNTER (OUTPATIENT)
Dept: ONCOLOGY | Facility: HOSPITAL | Age: 48
Discharge: HOME | End: 2023-05-19
Payer: COMMERCIAL

## 2023-05-19 VITALS
OXYGEN SATURATION: 98 % | RESPIRATION RATE: 18 BRPM | HEART RATE: 66 BPM | BODY MASS INDEX: 51.91 KG/M2 | DIASTOLIC BLOOD PRESSURE: 75 MMHG | TEMPERATURE: 98 F | WEIGHT: 293 LBS | SYSTOLIC BLOOD PRESSURE: 129 MMHG | HEIGHT: 63 IN

## 2023-05-19 DIAGNOSIS — K90.49 MALABSORPTION DUE TO INTOLERANCE, NOT ELSEWHERE CLASSIFIED: Primary | ICD-10-CM

## 2023-05-19 DIAGNOSIS — E61.1 IRON DEFICIENCY: ICD-10-CM

## 2023-05-19 PROCEDURE — 96365 THER/PROPH/DIAG IV INF INIT: CPT

## 2023-05-19 PROCEDURE — 96374 THER/PROPH/DIAG INJ IV PUSH: CPT

## 2023-05-19 PROCEDURE — 25010000002 IRON SUCROSE PER 1 MG: Performed by: INTERNAL MEDICINE

## 2023-05-19 RX ORDER — SODIUM CHLORIDE 9 MG/ML
250 INJECTION, SOLUTION INTRAVENOUS ONCE
Status: COMPLETED | OUTPATIENT
Start: 2023-05-19 | End: 2023-05-19

## 2023-05-19 RX ADMIN — IRON SUCROSE 200 MG: 20 INJECTION, SOLUTION INTRAVENOUS at 14:35

## 2023-05-19 RX ADMIN — SODIUM CHLORIDE 250 ML: 9 INJECTION, SOLUTION INTRAVENOUS at 14:30

## 2023-05-26 ENCOUNTER — HOSPITAL ENCOUNTER (OUTPATIENT)
Dept: ONCOLOGY | Facility: HOSPITAL | Age: 48
Discharge: HOME | End: 2023-05-26
Payer: COMMERCIAL

## 2023-05-26 VITALS
WEIGHT: 293 LBS | HEIGHT: 63 IN | DIASTOLIC BLOOD PRESSURE: 70 MMHG | SYSTOLIC BLOOD PRESSURE: 143 MMHG | RESPIRATION RATE: 14 BRPM | OXYGEN SATURATION: 95 % | BODY MASS INDEX: 51.91 KG/M2 | HEART RATE: 94 BPM

## 2023-05-26 DIAGNOSIS — K90.49 MALABSORPTION DUE TO INTOLERANCE, NOT ELSEWHERE CLASSIFIED: Primary | ICD-10-CM

## 2023-05-26 DIAGNOSIS — E61.1 IRON DEFICIENCY: ICD-10-CM

## 2023-05-26 PROCEDURE — 96365 THER/PROPH/DIAG IV INF INIT: CPT

## 2023-05-26 PROCEDURE — 25010000002 IRON SUCROSE PER 1 MG: Performed by: INTERNAL MEDICINE

## 2023-05-26 RX ORDER — SODIUM CHLORIDE 9 MG/ML
250 INJECTION, SOLUTION INTRAVENOUS ONCE
Status: COMPLETED | OUTPATIENT
Start: 2023-05-26 | End: 2023-05-26

## 2023-05-26 RX ADMIN — IRON SUCROSE 200 MG: 20 INJECTION, SOLUTION INTRAVENOUS at 14:15

## 2023-05-26 RX ADMIN — SODIUM CHLORIDE 250 ML: 9 INJECTION, SOLUTION INTRAVENOUS at 14:15

## 2023-07-28 ENCOUNTER — OFFICE VISIT (OUTPATIENT)
Dept: FAMILY MEDICINE CLINIC | Facility: CLINIC | Age: 48
End: 2023-07-28
Payer: COMMERCIAL

## 2023-07-28 VITALS
DIASTOLIC BLOOD PRESSURE: 75 MMHG | OXYGEN SATURATION: 98 % | HEART RATE: 87 BPM | BODY MASS INDEX: 54.38 KG/M2 | TEMPERATURE: 97.9 F | SYSTOLIC BLOOD PRESSURE: 110 MMHG | WEIGHT: 293 LBS

## 2023-07-28 DIAGNOSIS — G62.9 NEUROPATHY: ICD-10-CM

## 2023-07-28 DIAGNOSIS — Z12.31 BREAST CANCER SCREENING BY MAMMOGRAM: ICD-10-CM

## 2023-07-28 DIAGNOSIS — E78.5 DYSLIPIDEMIA: ICD-10-CM

## 2023-07-28 DIAGNOSIS — Z87.891 HISTORY OF CIGARETTE SMOKING: ICD-10-CM

## 2023-07-28 DIAGNOSIS — E66.01 CLASS 3 SEVERE OBESITY WITH SERIOUS COMORBIDITY AND BODY MASS INDEX (BMI) OF 50.0 TO 59.9 IN ADULT, UNSPECIFIED OBESITY TYPE: ICD-10-CM

## 2023-07-28 DIAGNOSIS — J44.9 CHRONIC OBSTRUCTIVE PULMONARY DISEASE, UNSPECIFIED COPD TYPE: Primary | ICD-10-CM

## 2023-07-28 PROCEDURE — 99214 OFFICE O/P EST MOD 30 MIN: CPT | Performed by: NURSE PRACTITIONER

## 2023-07-28 NOTE — PROGRESS NOTES
Subjective     Grace Clement is a 48 y.o. female.     History of Present Illness  Patient is here today for 6-month follow-up on chronic medical conditions.    Hyperlipidemia-patient is currently on Lipitor 20 mg daily and aspirin 81 mg daily. Doing well on the medication. She eats a well balanced diet. She doesn't exercise much due to her feet pain.     COPD-patient is currently on Singulair 10 mg nightly, Allegra, Flonase.  She is also on Breztri twice daily inhalation and albuterol nebulizers as needed.  She is on continuous oxygen at 2 L.  She sees Dr. Engle with pulmonary. She states her breathing is doing well. She does not smoke- quit 4 yrs ago.     Iron deficiency anemia-patient sees hematology for this.  She has had IV iron infusions.  She is stable at this time.  She is on every other day iron supplementation.    Sleep apnea-patient sees Dr. Engle.  She uses a CPAP nightly.    Chronic back pain/neuropathy-patient sees neuro spine for this.  She is currently on gabapentin 300 mg 3 times daily. She states the medication helps but she continues to have pain which is worse on some days.     Labs- UTD  Pap smear- UTD- sees GYN  Mammogram- due  DEXA-  Colonoscopy-    Vaccines:  Flu-  PNA-  Shingles-  Tdap-  Covid-19-    Dental exam-  Eye exam-       The following portions of the patient's history were reviewed and updated as appropriate: allergies, current medications, past family history, past medical history, past social history, past surgical history, and problem list.    Review of Systems   Constitutional:  Positive for fatigue. Negative for chills and fever.   Respiratory:  Negative for chest tightness and shortness of breath.    Cardiovascular:  Negative for chest pain and palpitations.   Gastrointestinal:  Negative for abdominal pain, nausea and vomiting.   Genitourinary:  Negative for breast lump, breast pain, frequency and urgency.   Musculoskeletal:  Positive for arthralgias (feet pain).    Neurological:  Negative for dizziness and headache.   Psychiatric/Behavioral:  Negative for depressed mood and stress. The patient is not nervous/anxious.      Objective     /75   Pulse 87   Temp 97.9 °F (36.6 °C) (Oral)   Wt (!) 139 kg (307 lb)   SpO2 98%   BMI 54.38 kg/m²     Current Outpatient Medications on File Prior to Visit   Medication Sig Dispense Refill    albuterol (ACCUNEB) 0.63 MG/3ML nebulizer solution Take 3 mL by nebulization Every 6 (Six) Hours As Needed for Wheezing. 360 mL 3    albuterol sulfate  (90 Base) MCG/ACT inhaler Inhale 2 puffs As Needed.      aspirin 81 MG tablet Take 1 tablet by mouth Daily.      atorvastatin (LIPITOR) 20 MG tablet TAKE ONE TABLET BY MOUTH DAILY 90 tablet 1    Breztri Aerosphere 160-9-4.8 MCG/ACT aerosol inhaler       ferrous gluconate (FERGON) 324 MG tablet Take 1 tablet by mouth Every Other Day. 15 tablet 11    fexofenadine (ALLEGRA) 60 MG tablet MUCINEX ALLERGY TABLET      fluticasone (FLONASE) 50 MCG/ACT nasal spray 2 sprays into the nostril(s) as directed by provider Daily.      gabapentin (NEURONTIN) 300 MG capsule Wk1: take 1 tab at bedtime, Wk2: take 1 pill twice a day, Wk3 1 tab tree times a day 90 capsule 5    gabapentin (NEURONTIN) 300 MG capsule Take 1 capsule by mouth 3 (Three) Times a Day for 180 days. 90 capsule 5    medroxyPROGESTERone (PROVERA) 10 MG tablet       meloxicam (MOBIC) 15 MG tablet TAKE ONE TABLET BY MOUTH DAILY 90 tablet 1    montelukast (SINGULAIR) 10 MG tablet TAKE ONE TABLET BY MOUTH ONCE NIGHTLY 90 tablet 1    vitamin D3 125 MCG (5000 UT) capsule capsule Take 1 capsule by mouth Daily.       No current facility-administered medications on file prior to visit.        Class 3 Severe Obesity (BMI >=40). Obesity-related health conditions include the following: obstructive sleep apnea and dyslipidemias. Obesity is unchanged. BMI is is above average; BMI management plan is completed. We discussed portion control and  increasing exercise.       Physical Exam  Vitals reviewed.   Constitutional:       General: She is not in acute distress.     Appearance: Normal appearance. She is well-developed. She is obese. She is not diaphoretic.   HENT:      Head: Normocephalic and atraumatic.   Eyes:      General:         Right eye: No discharge.         Left eye: No discharge.      Extraocular Movements: Extraocular movements intact.      Conjunctiva/sclera: Conjunctivae normal.   Cardiovascular:      Rate and Rhythm: Normal rate and regular rhythm.      Heart sounds: No murmur heard.  Pulmonary:      Effort: Pulmonary effort is normal. No respiratory distress.      Breath sounds: Normal breath sounds. No wheezing or rales.      Comments: On 2L NC continuous  Abdominal:      General: Bowel sounds are normal.      Palpations: Abdomen is soft.   Musculoskeletal:         General: Normal range of motion.      Cervical back: Normal range of motion.   Skin:     General: Skin is warm and dry.   Neurological:      General: No focal deficit present.      Mental Status: She is alert and oriented to person, place, and time.   Psychiatric:         Mood and Affect: Mood normal.         Behavior: Behavior normal.         Thought Content: Thought content normal.         Judgment: Judgment normal.         Assessment & Plan     Diagnoses and all orders for this visit:    1. Chronic obstructive pulmonary disease, unspecified COPD type (Primary)  Comments:  stable  sees pulmonary  cont home O2  cont treatment    2. Breast cancer screening by mammogram  -     Mammo Screening Digital Tomosynthesis Bilateral With CAD; Future    3. History of cigarette smoking  Comments:  get CT scan lung cancer screening  Orders:  -     CT Chest Low Dose Wo; Future    4. Neuropathy  Comments:  stable  cont gabapentin   sees neuro spine    5. Dyslipidemia  Comments:  stable  work on diet and exercise  cont statin    6. Class 3 severe obesity with serious comorbidity and body mass  index (BMI) of 50.0 to 59.9 in adult, unspecified obesity type  Comments:  exercise 150min/wk  work on diet and exercise  discussed intuitive eating  will think about semaglutide

## 2023-07-28 NOTE — PATIENT INSTRUCTIONS
Think about semaglutide Injections  Start exercising 150mins/wk  Read up on intuitive eating  Get mammogram  Get CT scan

## 2023-08-24 NOTE — PROGRESS NOTES
Subjective: Small fiber Neuropathy     Patient ID: Grace Clement is a 48 y.o. female.    History of Present Illness small fiber neuropathy  Ms. Clement is a 48-year-old  female with a BMI of 53.50 who presented alone for a follow-up on small fiber neuropathy.  She has been placed on gabapentin 300 mg and is taking the medication 1 in the morning and 2 at night.  She also feels she has some arthritis in her feet and states the Voltaren cream she has actually helps her feet as well.  She denies any falls.  She states she feels very good on the current dose of gabapentin and would like to continue it.  She was notified if she felt she needed a higher dose at night to call the clinic or send a message through the bandar that the gabapentin could be increased.    She reports she is trying to lose weight and is exercising.      The following portions of the patient's history were reviewed and updated as appropriate: allergies, current medications, past family history, past medical history, past social history, past surgical history and problem list.    Family History   Problem Relation Age of Onset    Alcohol abuse Mother     COPD Mother     Depression Mother     Vision loss Mother     Early death Mother     Alcohol abuse Father     Early death Father     Alcohol abuse Sister     Depression Sister     Miscarriages / Stillbirths Sister     Learning disabilities Sister     Mental illness Sister     Miscarriages / Stillbirths Sister     Alcohol abuse Brother     COPD Brother     Hyperlipidemia Brother     Drug abuse Brother     Early death Brother     Arthritis Brother     Drug abuse Brother     Cancer Maternal Uncle     Early death Maternal Uncle     Heart disease Maternal Grandmother     Early death Maternal Grandmother     Anxiety disorder Daughter     Asthma Daughter     Depression Daughter     Drug abuse Daughter     Learning disabilities Daughter     Mental illness Daughter        Past Medical History:    Diagnosis Date    Allergic     Asthma     COPD (chronic obstructive pulmonary disease)     Hyperlipidemia     Vitamin D deficiency        Social History     Socioeconomic History    Marital status:    Tobacco Use    Smoking status: Former     Packs/day: 1.50     Years: 30.00     Pack years: 45.00     Types: Cigarettes     Start date: 3/10/1984     Quit date: 2019     Years since quittin.3    Smokeless tobacco: Never   Vaping Use    Vaping Use: Former   Substance and Sexual Activity    Alcohol use: No    Drug use: Not Currently    Sexual activity: Not Currently     Partners: Male     Birth control/protection: Post-menopausal         Current Outpatient Medications:     albuterol (ACCUNEB) 0.63 MG/3ML nebulizer solution, Take 3 mL by nebulization Every 6 (Six) Hours As Needed for Wheezing., Disp: 360 mL, Rfl: 3    albuterol sulfate  (90 Base) MCG/ACT inhaler, Inhale 2 puffs As Needed., Disp: , Rfl:     aspirin 81 MG tablet, Take 1 tablet by mouth Daily., Disp: , Rfl:     atorvastatin (LIPITOR) 20 MG tablet, TAKE ONE TABLET BY MOUTH DAILY, Disp: 90 tablet, Rfl: 1    Breztri Aerosphere 160-9-4.8 MCG/ACT aerosol inhaler, , Disp: , Rfl:     fexofenadine (ALLEGRA) 60 MG tablet, MUCINEX ALLERGY TABLET, Disp: , Rfl:     fluticasone (FLONASE) 50 MCG/ACT nasal spray, 2 sprays into the nostril(s) as directed by provider Daily., Disp: , Rfl:     gabapentin (NEURONTIN) 300 MG capsule, Wk1: take 1 tab at bedtime, Wk2: take 1 pill twice a day, Wk3 1 tab tree times a day, Disp: 90 capsule, Rfl: 5    gabapentin (NEURONTIN) 300 MG capsule, Take 1 capsule by mouth 3 (Three) Times a Day for 180 days., Disp: 90 capsule, Rfl: 5    medroxyPROGESTERone (PROVERA) 10 MG tablet, , Disp: , Rfl:     meloxicam (MOBIC) 15 MG tablet, TAKE ONE TABLET BY MOUTH DAILY, Disp: 90 tablet, Rfl: 1    montelukast (SINGULAIR) 10 MG tablet, TAKE ONE TABLET BY MOUTH ONCE NIGHTLY, Disp: 90 tablet, Rfl: 1    vitamin D3 125 MCG (5000  UT) capsule capsule, Take 1 capsule by mouth Daily., Disp: , Rfl:     Review of Systems   Constitutional:  Positive for fatigue.   HENT:  Positive for dental problem.    Respiratory:  Positive for shortness of breath and wheezing.    Endocrine: Positive for cold intolerance.   Allergic/Immunologic: Positive for environmental allergies.   All other systems reviewed and are negative.       I have reviewed ROS completed by medical assistant.     Objective:    Neurologic Exam     Mental Status   Oriented to person, place, and time.     Cranial Nerves   Cranial nerves II through XII intact.     CN III, IV, VI   Pupils are equal, round, and reactive to light.    Gait, Coordination, and Reflexes     Gait  Gait: normal    Physical Exam  Vitals and nursing note reviewed.   Constitutional:       Appearance: Normal appearance. She is morbidly obese.   HENT:      Head: Normocephalic and atraumatic.      Right Ear: Hearing normal.      Left Ear: Hearing normal.      Nose: Nose normal.      Mouth/Throat:      Lips: Pink.   Eyes:      General: Lids are normal. No visual field deficit.     Extraocular Movements: Extraocular movements intact.      Pupils: Pupils are equal, round, and reactive to light.   Cardiovascular:      Rate and Rhythm: Normal rate and regular rhythm.   Pulmonary:      Effort: Pulmonary effort is normal.   Musculoskeletal:         General: Normal range of motion.      Cervical back: Normal range of motion.   Skin:     General: Skin is warm.   Neurological:      General: No focal deficit present.      Mental Status: She is alert and oriented to person, place, and time.      Cranial Nerves: Cranial nerves 2-12 are intact. No cranial nerve deficit, dysarthria or facial asymmetry.      Sensory: Sensory deficit (Paresthesias in her feet that are improved) present.      Motor: Motor function is intact. No weakness, tremor, atrophy, abnormal muscle tone, seizure activity or pronator drift.      Gait: Gait is intact.  Gait normal.   Psychiatric:         Attention and Perception: Attention normal.         Mood and Affect: Mood normal.         Behavior: Behavior is cooperative.       Assessment/Plan:  Discussion: The patient is to continue gabapentin 1 in the morning and 2 at bedtime.  Should she be able to go to 1 tablet 3 times a day and feels she needs to increase the bedtime dose she is to contact the clinic so that I can arrange that for her.  She was encouraged to continue the Voltaren cream as it may help any arthritis she has in her feet.    Diagnoses and all orders for this visit:    1. Neuropathy (Primary)      Continue Gabapentin 300 mg tid     Return in about 6 months (around 2/28/2024) for Annabelle Weaver.     I spent 26 minutes caring for Grace on this date of service. This time includes time spent by me in the following activities: reviewing tests, obtaining and/or reviewing a separately obtained history, performing a medically appropriate examination and/or evaluation, counseling and educating the patient/family/caregiver, documenting information in the medical record, and independently interpreting results and communicating that information with the patient/family/caregiver.      This document has been electronically signed by SAMMY Monroy on August 28, 2023 13:22 EDT

## 2023-08-28 ENCOUNTER — OFFICE VISIT (OUTPATIENT)
Dept: NEUROLOGY | Facility: CLINIC | Age: 48
End: 2023-08-28
Payer: COMMERCIAL

## 2023-08-28 VITALS
DIASTOLIC BLOOD PRESSURE: 68 MMHG | WEIGHT: 293 LBS | HEIGHT: 63 IN | HEART RATE: 89 BPM | SYSTOLIC BLOOD PRESSURE: 108 MMHG | BODY MASS INDEX: 51.91 KG/M2

## 2023-08-28 DIAGNOSIS — G62.9 NEUROPATHY: Primary | ICD-10-CM

## 2023-08-28 PROCEDURE — 99213 OFFICE O/P EST LOW 20 MIN: CPT | Performed by: NURSE PRACTITIONER

## 2023-09-15 ENCOUNTER — HOSPITAL ENCOUNTER (OUTPATIENT)
Dept: MAMMOGRAPHY | Facility: HOSPITAL | Age: 48
Discharge: HOME | End: 2023-09-15
Payer: COMMERCIAL

## 2023-09-15 ENCOUNTER — HOSPITAL ENCOUNTER (OUTPATIENT)
Dept: CT IMAGING | Facility: HOSPITAL | Age: 48
Discharge: HOME | End: 2023-09-15
Payer: COMMERCIAL

## 2023-09-15 DIAGNOSIS — Z87.891 HISTORY OF CIGARETTE SMOKING: ICD-10-CM

## 2023-09-15 DIAGNOSIS — Z12.31 BREAST CANCER SCREENING BY MAMMOGRAM: ICD-10-CM

## 2023-09-15 PROCEDURE — 77063 BREAST TOMOSYNTHESIS BI: CPT

## 2023-09-15 PROCEDURE — 71271 CT THORAX LUNG CANCER SCR C-: CPT

## 2023-09-15 PROCEDURE — 77067 SCR MAMMO BI INCL CAD: CPT

## 2023-09-18 ENCOUNTER — OFFICE VISIT (OUTPATIENT)
Dept: FAMILY MEDICINE CLINIC | Facility: CLINIC | Age: 48
End: 2023-09-18
Payer: COMMERCIAL

## 2023-09-18 VITALS
TEMPERATURE: 97.3 F | OXYGEN SATURATION: 97 % | HEIGHT: 63 IN | HEART RATE: 96 BPM | SYSTOLIC BLOOD PRESSURE: 182 MMHG | DIASTOLIC BLOOD PRESSURE: 88 MMHG | BODY MASS INDEX: 51.91 KG/M2 | WEIGHT: 293 LBS

## 2023-09-18 DIAGNOSIS — J06.9 UPPER RESPIRATORY TRACT INFECTION, UNSPECIFIED TYPE: Primary | ICD-10-CM

## 2023-09-18 PROCEDURE — 99213 OFFICE O/P EST LOW 20 MIN: CPT | Performed by: NURSE PRACTITIONER

## 2023-09-18 RX ORDER — CEFDINIR 300 MG/1
300 CAPSULE ORAL 2 TIMES DAILY
Qty: 14 CAPSULE | Refills: 0 | Status: SHIPPED | OUTPATIENT
Start: 2023-09-18 | End: 2023-09-25

## 2023-09-18 RX ORDER — METHYLPREDNISOLONE 4 MG/1
TABLET ORAL
Qty: 21 TABLET | Refills: 0 | Status: SHIPPED | OUTPATIENT
Start: 2023-09-18

## 2023-09-18 NOTE — PROGRESS NOTES
Subjective   Grace Clement is a 48 y.o. female.       HPI   Pt is here today with concern of chest congestion and cough.   Symptoms started last week.    Her grandson was around her and recently dx with croup.   She has had chest congestion with green sputum.  Some wheezes on ad off.  She has needed to use her albuterol nebs.  Denies any fever.  Mild head congestion.     The following portions of the patient's history were reviewed and updated as appropriate: allergies, current medications, past family history, past medical history, past social history, past surgical history, and problem list.    Review of Systems   Constitutional:  Negative for chills, fatigue and fever.   HENT:  Positive for congestion. Negative for ear discharge, ear pain, postnasal drip, rhinorrhea, sinus pressure, sneezing and sore throat.    Respiratory:  Positive for cough, shortness of breath and wheezing.    Cardiovascular:  Negative for chest pain and palpitations.   Gastrointestinal:  Negative for diarrhea, nausea and vomiting.   Neurological:  Negative for dizziness, weakness and headache.   Psychiatric/Behavioral:  Negative for depressed mood. The patient is not nervous/anxious.      Objective   Physical Exam  Vitals reviewed.   Constitutional:       General: She is not in acute distress.     Appearance: Normal appearance. She is obese.   HENT:      Head: Normocephalic and atraumatic.      Right Ear: Hearing, tympanic membrane, ear canal and external ear normal.      Left Ear: Hearing, tympanic membrane, ear canal and external ear normal.      Nose: Nose normal.      Mouth/Throat:      Lips: Pink.      Mouth: Mucous membranes are moist.      Pharynx: Posterior oropharyngeal erythema (postnasal drip) present. No pharyngeal swelling, oropharyngeal exudate or uvula swelling.   Eyes:      General:         Right eye: No discharge.         Left eye: No discharge.      Conjunctiva/sclera: Conjunctivae normal.   Cardiovascular:      Rate and  Rhythm: Normal rate and regular rhythm.      Pulses: Normal pulses.      Heart sounds: Normal heart sounds. No murmur heard.  Pulmonary:      Effort: Pulmonary effort is normal. No respiratory distress.      Breath sounds: Wheezing (a few expiratory wheezes in both bases.) present. No rhonchi or rales.   Chest:      Chest wall: No tenderness.   Abdominal:      Tenderness: There is no right CVA tenderness or left CVA tenderness.   Skin:     General: Skin is warm and dry.      Findings: No erythema.   Neurological:      General: No focal deficit present.      Mental Status: She is alert and oriented to person, place, and time.   Psychiatric:         Mood and Affect: Mood normal.                Assessment & Plan   Diagnoses and all orders for this visit:    1. Upper respiratory tract infection, unspecified type (Primary)  Comments:  Given cefdinir and medrol pack.    Increase fluids and rest.   Call if worsening.  Orders:  -     methylPREDNISolone (MEDROL) 4 MG dose pack; Take as directed on package instructions.  Dispense: 21 tablet; Refill: 0  -     cefdinir (OMNICEF) 300 MG capsule; Take 1 capsule by mouth 2 (Two) Times a Day for 7 days.  Dispense: 14 capsule; Refill: 0

## 2023-09-19 ENCOUNTER — TELEPHONE (OUTPATIENT)
Dept: FAMILY MEDICINE CLINIC | Facility: CLINIC | Age: 48
End: 2023-09-19
Payer: COMMERCIAL

## 2023-09-19 NOTE — TELEPHONE ENCOUNTER
Prior Auth completed for methylPREDNISolone 4MG tablets via covermymeds. Pending determination.      Outcome  Additional Information Required  Prior Authorization Not Required    Faxed to patient pharmacy so medication can be filled.

## 2023-09-22 ENCOUNTER — TELEPHONE (OUTPATIENT)
Dept: ONCOLOGY | Facility: CLINIC | Age: 48
End: 2023-09-22

## 2023-09-26 DIAGNOSIS — G62.9 NEUROPATHY: Primary | ICD-10-CM

## 2023-09-26 RX ORDER — GABAPENTIN 300 MG/1
CAPSULE ORAL
Qty: 120 CAPSULE | Refills: 5 | Status: SHIPPED | OUTPATIENT
Start: 2023-09-26

## 2023-09-27 ENCOUNTER — TELEPHONE (OUTPATIENT)
Dept: ONCOLOGY | Facility: CLINIC | Age: 48
End: 2023-09-27

## 2023-09-27 DIAGNOSIS — J44.9 CHRONIC OBSTRUCTIVE PULMONARY DISEASE, UNSPECIFIED COPD TYPE: ICD-10-CM

## 2023-09-27 RX ORDER — MONTELUKAST SODIUM 10 MG/1
TABLET ORAL
Qty: 90 TABLET | Refills: 1 | Status: SHIPPED | OUTPATIENT
Start: 2023-09-27 | End: 2023-09-30 | Stop reason: SDUPTHER

## 2023-09-27 RX ORDER — ATORVASTATIN CALCIUM 20 MG/1
TABLET, FILM COATED ORAL
Qty: 90 TABLET | Refills: 1 | Status: SHIPPED | OUTPATIENT
Start: 2023-09-27 | End: 2023-09-30 | Stop reason: SDUPTHER

## 2023-09-27 NOTE — PROGRESS NOTES
HEMATOLOGY ONCOLOGY FOLLOW UP        Patient name: Grace Clement  : 1975  MRN: [id number]  Primary Care Physician: Jaye Ogden APRN  Referring Physician: No ref. provider found  Reason For Consult:     History of Present Illness:    2022: In the office for initial evaluation. Had anemia for a long time, at least for 3 years. For the most part normocytic. Not clearly progressive and not associated to any symptoms. Complained of fatigue but carried a history of severe chronic obstructive pulmonary disease and was on oxygen for at least 2 years. Also suffered from very severe obesity. Had been limited by her dyspnea and difficulties with standing up for long periods of time. Eating well and no nausea or vomiting. Persistently dyspneic and with some cough and expectoration. No chest pain or dysphagia. No abdominal pain and no diarrhea. Denied melena, hematochezia or hematuria. Had been taking meloxicam regularly. Intermittently taking ibuprofen 400 mg per dose. Also receiving daily low dose acetyl salicylic acid.     2022: Feeling about the same. Eating well and no weight loss. No bleeding. No fever. On exam there was no significant change. The laboratory exams were consistent with iron deficiency. A decision was made to start treatment with oral iron. She was given instructions and prescriptions were sent. She was asked to return in 2 months     3/9/2023: Without much change but feeling somewhat better.  Stronger.  Able to take the iron without side effects.  The physical exam was unchanged.  The laboratory exams revealed very modest improvement in the hemoglobin.  A decision was made to investigate iron again as she may be absorbing the supplement poorly and may justify treatment with intravenous iron.    2023: Feeling about the same as before. Still having some fatigue. Went back to school and is able to fulfill her duties. Eating as much as before and no weight  loss. Denied increasing dyspnea though still needing oxygen. No melena or hematochezia. Able to take the iron every other day without difficulties and taking it compliantly. The exam without significant changes. BMI greater than 50 kg/m2. The laboratory exams were reviewed and discussed with her. Persisted with anemia of the same degree as before. The iron had increased some but her total iron binding capacity remained subnormal. A decision was made to treat her with intravenous iron. Side effects and potential complications were described and the measures taken to reduce risk were explained. She was interested in proceeding.     6/23/2023: Received the intravenous iron without complications. Feeling better at this time. No chest pain and no cough. No abdominal pain or diarrhea. No dysuria. Persists with edema of the lower extremities. On exam no changes. The laboratory exams revealed evidence of improvement of the hemoglobin and resolution of the microcytosis. A decision was made to continue to follow and she was asked to see me in 4 months.     9/29/2023: Feeling well and without new symptoms. Dyspneic with exertion and still using 2 lpm of oxygen. No bleeding. No chest pain or cough and no abdominal pain. On exam alert. Chronically ill appearing woman. No distress. No jaundice. No oral lesions and lungs diminished bilaterally. Heart regular. Abdomen soft and not tender. No edema. The laboratory exams reported near complete resolution of her anemia. Her white blood cell count and differential and platelet count were unremarkable. A decision was made to continue observation.     Subjective:  9/29/2023: Without new symptoms. Active and eating well. No weight loss. No fevers or nocturnal diaphoresis. Denied chest pain or cough and no abdominal pain. No edema.    The following portions of the patient's history were reviewed and updated as appropriate: allergies, current medications, past family history, past medical  history, past social history, past surgical history and problem list.    Past Medical History:   Diagnosis Date    Allergic 1994    Arthritis     Asthma     COPD (chronic obstructive pulmonary disease)     Hyperlipidemia     Obesity     Vitamin D deficiency      Past Surgical History:   Procedure Laterality Date    CHOLECYSTECTOMY      COLONOSCOPY  7/20/2022    CRYOABLATION      LAPAROSCOPIC TUBAL LIGATION      TUBAL ABDOMINAL LIGATION  6/16/2005       Current Outpatient Medications:     albuterol (ACCUNEB) 0.63 MG/3ML nebulizer solution, Take 3 mL by nebulization Every 6 (Six) Hours As Needed for Wheezing., Disp: 360 mL, Rfl: 3    aspirin 81 MG tablet, Take 1 tablet by mouth Daily., Disp: , Rfl:     atorvastatin (LIPITOR) 20 MG tablet, TAKE ONE TABLET BY MOUTH DAILY, Disp: 90 tablet, Rfl: 1    Breztri Aerosphere 160-9-4.8 MCG/ACT aerosol inhaler, , Disp: , Rfl:     fexofenadine (ALLEGRA) 60 MG tablet, MUCINEX ALLERGY TABLET, Disp: , Rfl:     fluticasone (FLONASE) 50 MCG/ACT nasal spray, 2 sprays into the nostril(s) as directed by provider Daily., Disp: , Rfl:     gabapentin (NEURONTIN) 300 MG capsule, Wk1: take 1 tab at bedtime, Wk2: take 1 pill twice a day, Wk3 1 tab tree times a day, Disp: 90 capsule, Rfl: 5    gabapentin (NEURONTIN) 300 MG capsule, 1 tab in am, 1 tab in afternoon, 2 tabs at bedtime, Disp: 120 capsule, Rfl: 5    medroxyPROGESTERone (PROVERA) 10 MG tablet, , Disp: , Rfl:     meloxicam (MOBIC) 15 MG tablet, TAKE ONE TABLET BY MOUTH DAILY, Disp: 90 tablet, Rfl: 1    methylPREDNISolone (MEDROL) 4 MG dose pack, Take as directed on package instructions., Disp: 21 tablet, Rfl: 0    montelukast (SINGULAIR) 10 MG tablet, TAKE ONE TABLET BY MOUTH ONCE NIGHTLY, Disp: 90 tablet, Rfl: 1    vitamin D3 125 MCG (5000 UT) capsule capsule, Take 1 capsule by mouth Daily., Disp: , Rfl:     albuterol sulfate  (90 Base) MCG/ACT inhaler, Inhale 2 puffs As Needed., Disp: , Rfl:     Allergies   Allergen Reactions     Codeine Hives, Itching and Rash     Family History   Problem Relation Age of Onset    Alcohol abuse Mother     COPD Mother     Depression Mother     Vision loss Mother     Early death Mother     Alcohol abuse Father     Early death Father     Alcohol abuse Sister     Depression Sister     Miscarriages / Stillbirths Sister     Learning disabilities Sister     Mental illness Sister     Miscarriages / Stillbirths Sister     Alcohol abuse Brother     COPD Brother     Hyperlipidemia Brother     Drug abuse Brother     Early death Brother     Arthritis Brother     Drug abuse Brother     Cancer Maternal Uncle     Early death Maternal Uncle     Heart disease Maternal Grandmother     Early death Maternal Grandmother     Anxiety disorder Daughter     Asthma Daughter     Depression Daughter     Drug abuse Daughter     Learning disabilities Daughter     Mental illness Daughter      Cancer-related family history includes Cancer in her maternal uncle.    Social History     Tobacco Use    Smoking status: Former     Packs/day: 1.50     Years: 30.00     Pack years: 45.00     Types: Cigarettes     Start date: 3/10/1984     Quit date: 2019     Years since quittin.4    Smokeless tobacco: Never   Vaping Use    Vaping Use: Former   Substance Use Topics    Alcohol use: No    Drug use: Not Currently     Social History     Social History Narrative    Not on file      ROS:   Review of Systems   Constitutional:  Negative for activity change, appetite change, chills, diaphoresis, fatigue, fever and unexpected weight change.   HENT:  Negative for congestion, dental problem, drooling, ear discharge, ear pain, facial swelling, hearing loss, mouth sores, nosebleeds, postnasal drip, rhinorrhea, sinus pressure, sinus pain, sneezing, sore throat, tinnitus, trouble swallowing and voice change.    Eyes:  Negative for photophobia, pain, discharge, redness, itching and visual disturbance.   Respiratory:  Negative for apnea, cough, choking,  "chest tightness, shortness of breath, wheezing and stridor.    Cardiovascular:  Negative for chest pain, palpitations and leg swelling.   Gastrointestinal:  Negative for abdominal distention, abdominal pain, anal bleeding, blood in stool, constipation, diarrhea, nausea, rectal pain and vomiting.   Endocrine: Negative for cold intolerance, heat intolerance, polydipsia and polyuria.   Genitourinary:  Negative for decreased urine volume, difficulty urinating, dysuria, flank pain, frequency, genital sores, hematuria, urgency and vaginal bleeding.   Musculoskeletal:  Negative for arthralgias, back pain, gait problem, joint swelling, myalgias, neck pain and neck stiffness.   Skin:  Negative for color change, pallor and rash.   Neurological:  Negative for dizziness, tremors, seizures, syncope, facial asymmetry, speech difficulty, weakness, light-headedness, numbness and headaches.   Hematological:  Negative for adenopathy. Does not bruise/bleed easily.   Psychiatric/Behavioral:  Negative for agitation, behavioral problems, confusion, decreased concentration, hallucinations, self-injury, sleep disturbance and suicidal ideas. The patient is not nervous/anxious.      Objective:  Vital signs:  Vitals:    09/29/23 1118   BP: 129/69   Pulse: 74   Temp: 98.1 °F (36.7 °C)   TempSrc: Oral   SpO2: 99%   Weight: (!) 141 kg (310 lb 5.8 oz)   Height: 160 cm (63\")   PainSc: 0-No pain     Body mass index is 54.98 kg/m².  ECOG  (2) Ambulatory and capable of self care, unable to carry out work activity, up and about > 50% or waking hours    Physical Exam:   Physical Exam  Constitutional:       General: She is not in acute distress.     Appearance: She is ill-appearing. She is not toxic-appearing or diaphoretic.      Comments: Chronically ill appearing.  BMI greater than 54 kg/m².   HENT:      Head: Normocephalic and atraumatic.      Right Ear: External ear normal.      Left Ear: External ear normal.      Nose: Nose normal.      " Mouth/Throat:      Mouth: Mucous membranes are moist.      Pharynx: Oropharynx is clear. No oropharyngeal exudate or posterior oropharyngeal erythema.   Eyes:      General: No scleral icterus.        Right eye: No discharge.         Left eye: No discharge.      Conjunctiva/sclera: Conjunctivae normal.      Pupils: Pupils are equal, round, and reactive to light.   Cardiovascular:      Rate and Rhythm: Normal rate and regular rhythm.      Pulses: Normal pulses.      Heart sounds: No murmur heard.    No friction rub. No gallop.   Pulmonary:      Effort: No respiratory distress.      Breath sounds: No stridor. No wheezing, rhonchi or rales.   Abdominal:      General: Abdomen is flat. Bowel sounds are normal. There is no distension.      Palpations: Abdomen is soft. There is no mass.      Tenderness: There is no abdominal tenderness. There is no right CVA tenderness, left CVA tenderness, guarding or rebound.      Hernia: No hernia is present.   Musculoskeletal:         General: No swelling, tenderness, deformity or signs of injury.      Cervical back: No rigidity.      Right lower leg: No edema.      Left lower leg: No edema.   Lymphadenopathy:      Cervical: No cervical adenopathy.   Skin:     Coloration: Skin is not jaundiced.      Findings: No bruising, lesion or rash.   Neurological:      General: No focal deficit present.      Mental Status: She is alert and oriented to person, place, and time.      Cranial Nerves: No cranial nerve deficit.      Motor: No weakness.      Gait: Gait normal.   Psychiatric:         Mood and Affect: Mood normal.         Behavior: Behavior normal.         Thought Content: Thought content normal.         Judgment: Judgment normal.   SHAYAN Resendiz MD performed the physical exam on 9/29/2023 as documented above.     Lab Results - Last 18 Months   Lab Units 09/29/23  1113 06/23/23  1034 04/21/23  1054   WBC 10*3/mm3 6.05 5.39 6.19   HEMOGLOBIN g/dL 11.5* 11.2* 10.5*   HEMATOCRIT % 35.2  34.7 33.5*   PLATELETS 10*3/mm3 189 236 247   MCV fL 99.7* 94.3 90.3     Lab Results - Last 18 Months   Lab Units 06/23/23  1034 04/21/23  1054 03/09/23  1450   SODIUM mmol/L 142 141 142   POTASSIUM mmol/L 4.0 4.0 4.3   CHLORIDE mmol/L 103 105 105   CO2 mmol/L 27.0 24.0 25.0   BUN mg/dL 11 9 17   CREATININE mg/dL 0.94 0.88 0.90   CALCIUM mg/dL 9.7 9.0 9.2   BILIRUBIN mg/dL 0.8 1.1 1.0   ALK PHOS U/L 68 68 73   ALT (SGPT) U/L 29 12 17   AST (SGOT) U/L 24 17 19   GLUCOSE mg/dL 100* 113* 105*     Lab Results   Component Value Date    GLUCOSE 100 (H) 06/23/2023    BUN 11 06/23/2023    CREATININE 0.94 06/23/2023    EGFRIFNONA 88 04/03/2020    BCR 11.7 06/23/2023    K 4.0 06/23/2023    CO2 27.0 06/23/2023    CALCIUM 9.7 06/23/2023    PROTENTOTREF 6.6 10/28/2022    ALBUMIN 4.3 06/23/2023    LABIL2 1.2 10/28/2022    AST 24 06/23/2023    ALT 29 06/23/2023     Lab Results   Component Value Date    IRON 66 04/21/2023    TIBC 384 04/21/2023    FERRITIN 48.21 04/21/2023     Lab Results   Component Value Date    FOLATE 7.97 10/28/2022     Lab Results   Component Value Date    YCIOPOMS46 669 12/02/2022     Lab Results   Component Value Date    SPEP Comment 10/28/2022     Lab Results   Component Value Date    PHILIPPE Positive (A) 10/28/2022    SEDRATE 11 10/28/2022     Lab Results   Component Value Date    PTT 19.3 (L) 04/04/2019    INR 0.9 04/04/2019     Lab Results   Component Value Date    SEDRATE 11 10/28/2022     Assessment & Plan     Assessment:  Iron deficiency anemia: Nearly completely resolved. Persists with some anemia but this likely is related to chronic disease. She is to continue oral iron taking it every other day. She is to see me in 6 months.   Postmenopausal vaginal bleeding: Resolved. Following with her gynecologist.   BMI greater than 54 Kg/m2  She will see me in approximately 6 months.     Plan:  As above.     Oleksandr Resendiz MD on 9/29/2023 at 12:40 PM.

## 2023-09-29 ENCOUNTER — OFFICE VISIT (OUTPATIENT)
Dept: ONCOLOGY | Facility: CLINIC | Age: 48
End: 2023-09-29
Payer: COMMERCIAL

## 2023-09-29 ENCOUNTER — LAB (OUTPATIENT)
Dept: LAB | Facility: HOSPITAL | Age: 48
End: 2023-09-29
Payer: COMMERCIAL

## 2023-09-29 VITALS
OXYGEN SATURATION: 99 % | SYSTOLIC BLOOD PRESSURE: 129 MMHG | WEIGHT: 293 LBS | DIASTOLIC BLOOD PRESSURE: 69 MMHG | HEIGHT: 63 IN | HEART RATE: 74 BPM | BODY MASS INDEX: 51.91 KG/M2 | TEMPERATURE: 98.1 F

## 2023-09-29 DIAGNOSIS — E61.1 IRON DEFICIENCY: Primary | ICD-10-CM

## 2023-09-29 LAB
ALBUMIN SERPL-MCNC: 3.7 G/DL (ref 3.5–5.2)
ALBUMIN/GLOB SERPL: 1.6 G/DL
ALP SERPL-CCNC: 64 U/L (ref 39–117)
ALT SERPL W P-5'-P-CCNC: 19 U/L (ref 1–33)
ANION GAP SERPL CALCULATED.3IONS-SCNC: 12 MMOL/L (ref 5–15)
AST SERPL-CCNC: 24 U/L (ref 1–32)
BASOPHILS # BLD AUTO: 0.03 10*3/MM3 (ref 0–0.2)
BASOPHILS NFR BLD AUTO: 0.5 % (ref 0–1.5)
BILIRUB SERPL-MCNC: 1 MG/DL (ref 0–1.2)
BUN SERPL-MCNC: 10 MG/DL (ref 6–20)
BUN/CREAT SERPL: 11.6 (ref 7–25)
CALCIUM SPEC-SCNC: 8.8 MG/DL (ref 8.6–10.5)
CHLORIDE SERPL-SCNC: 106 MMOL/L (ref 98–107)
CO2 SERPL-SCNC: 20 MMOL/L (ref 22–29)
CREAT SERPL-MCNC: 0.86 MG/DL (ref 0.57–1)
DEPRECATED RDW RBC AUTO: 52.8 FL (ref 37–54)
EGFRCR SERPLBLD CKD-EPI 2021: 82.4 ML/MIN/1.73
EOSINOPHIL # BLD AUTO: 0.14 10*3/MM3 (ref 0–0.4)
EOSINOPHIL NFR BLD AUTO: 2.3 % (ref 0.3–6.2)
ERYTHROCYTE [DISTWIDTH] IN BLOOD BY AUTOMATED COUNT: 14.8 % (ref 12.3–15.4)
GLOBULIN UR ELPH-MCNC: 2.3 GM/DL
GLUCOSE SERPL-MCNC: 125 MG/DL (ref 65–99)
HCT VFR BLD AUTO: 35.2 % (ref 34–46.6)
HGB BLD-MCNC: 11.5 G/DL (ref 12–15.9)
HOLD SPECIMEN: NORMAL
LYMPHOCYTES # BLD AUTO: 1.45 10*3/MM3 (ref 0.7–3.1)
LYMPHOCYTES NFR BLD AUTO: 24 % (ref 19.6–45.3)
MCH RBC QN AUTO: 32.6 PG (ref 26.6–33)
MCHC RBC AUTO-ENTMCNC: 32.7 G/DL (ref 31.5–35.7)
MCV RBC AUTO: 99.7 FL (ref 79–97)
MONOCYTES # BLD AUTO: 0.37 10*3/MM3 (ref 0.1–0.9)
MONOCYTES NFR BLD AUTO: 6.1 % (ref 5–12)
NEUTROPHILS NFR BLD AUTO: 4.06 10*3/MM3 (ref 1.7–7)
NEUTROPHILS NFR BLD AUTO: 67.1 % (ref 42.7–76)
PLATELET # BLD AUTO: 189 10*3/MM3 (ref 140–450)
PMV BLD AUTO: 9.2 FL (ref 6–12)
POTASSIUM SERPL-SCNC: 4.5 MMOL/L (ref 3.5–5.2)
PROT SERPL-MCNC: 6 G/DL (ref 6–8.5)
RBC # BLD AUTO: 3.53 10*6/MM3 (ref 3.77–5.28)
SODIUM SERPL-SCNC: 138 MMOL/L (ref 136–145)
WBC NRBC COR # BLD: 6.05 10*3/MM3 (ref 3.4–10.8)

## 2023-09-29 PROCEDURE — 85025 COMPLETE CBC W/AUTO DIFF WBC: CPT

## 2023-09-29 PROCEDURE — 80053 COMPREHEN METABOLIC PANEL: CPT | Performed by: INTERNAL MEDICINE

## 2023-09-29 PROCEDURE — 36415 COLL VENOUS BLD VENIPUNCTURE: CPT

## 2023-09-30 DIAGNOSIS — J44.9 CHRONIC OBSTRUCTIVE PULMONARY DISEASE, UNSPECIFIED COPD TYPE: ICD-10-CM

## 2023-10-02 RX ORDER — ATORVASTATIN CALCIUM 20 MG/1
20 TABLET, FILM COATED ORAL DAILY
Qty: 90 TABLET | Refills: 1 | Status: SHIPPED | OUTPATIENT
Start: 2023-10-02

## 2023-10-02 RX ORDER — MONTELUKAST SODIUM 10 MG/1
10 TABLET ORAL NIGHTLY
Qty: 90 TABLET | Refills: 1 | Status: SHIPPED | OUTPATIENT
Start: 2023-10-02

## 2023-11-15 DIAGNOSIS — M79.642 PAIN IN BOTH HANDS: ICD-10-CM

## 2023-11-15 DIAGNOSIS — M79.641 PAIN IN BOTH HANDS: ICD-10-CM

## 2023-11-15 RX ORDER — MELOXICAM 15 MG/1
TABLET ORAL
Qty: 90 TABLET | Refills: 1 | Status: SHIPPED | OUTPATIENT
Start: 2023-11-15

## 2023-11-21 DIAGNOSIS — M79.641 PAIN IN BOTH HANDS: ICD-10-CM

## 2023-11-21 DIAGNOSIS — M79.642 PAIN IN BOTH HANDS: ICD-10-CM

## 2023-11-21 RX ORDER — MELOXICAM 15 MG/1
15 TABLET ORAL DAILY
Qty: 90 TABLET | Refills: 1 | Status: SHIPPED | OUTPATIENT
Start: 2023-11-21

## 2024-01-22 ENCOUNTER — TELEPHONE (OUTPATIENT)
Dept: ONCOLOGY | Facility: CLINIC | Age: 49
End: 2024-01-22

## 2024-01-22 NOTE — TELEPHONE ENCOUNTER
Caller: Grace Clement    Relationship: Self    Best call back number: [phone]    Who are you requesting to speak with (clinical staff, provider,  specific staff member): scheduling    Do you know the name of the person who called: patient    What was the call regarding: pt needs to reschedule 01/31's appts until after 1st of february due to insurance coverage concerns

## 2024-02-05 ENCOUNTER — TELEPHONE (OUTPATIENT)
Dept: ONCOLOGY | Facility: CLINIC | Age: 49
End: 2024-02-05

## 2024-02-05 NOTE — TELEPHONE ENCOUNTER
Caller: Grace Clement    Relationship: Self    Best call back number: [phone]    Who are you requesting to speak with (clinical staff, provider,  specific staff member): scheduling    Do you know the name of the person who called: patient    What was the call regarding: pt advises that she needs to cancel 02/07's appts due to transportation issues. she states that she'll call back when she's ready to reschedule

## 2024-02-13 ENCOUNTER — TELEPHONE (OUTPATIENT)
Dept: ONCOLOGY | Facility: CLINIC | Age: 49
End: 2024-02-13

## 2024-02-13 NOTE — TELEPHONE ENCOUNTER
Caller: Grace Clement    Relationship: Self    Best call back number: [phone]    Who are you requesting to speak with (clinical staff, provider,  specific staff member): scheduling    Do you know the name of the person who called: patient    What was the call regarding: pt needs to reschedule 02/07's appts

## 2024-02-14 ENCOUNTER — OFFICE VISIT (OUTPATIENT)
Dept: FAMILY MEDICINE CLINIC | Facility: CLINIC | Age: 49
End: 2024-02-14
Payer: COMMERCIAL

## 2024-02-14 ENCOUNTER — LAB (OUTPATIENT)
Dept: FAMILY MEDICINE CLINIC | Facility: CLINIC | Age: 49
End: 2024-02-14
Payer: COMMERCIAL

## 2024-02-14 VITALS
HEART RATE: 75 BPM | SYSTOLIC BLOOD PRESSURE: 135 MMHG | BODY MASS INDEX: 55.09 KG/M2 | TEMPERATURE: 98.2 F | OXYGEN SATURATION: 98 % | WEIGHT: 293 LBS | DIASTOLIC BLOOD PRESSURE: 81 MMHG

## 2024-02-14 DIAGNOSIS — F32.A DEPRESSION, UNSPECIFIED DEPRESSION TYPE: Primary | ICD-10-CM

## 2024-02-14 DIAGNOSIS — J44.9 CHRONIC OBSTRUCTIVE PULMONARY DISEASE, UNSPECIFIED COPD TYPE: ICD-10-CM

## 2024-02-14 DIAGNOSIS — G62.9 NEUROPATHY: ICD-10-CM

## 2024-02-14 DIAGNOSIS — E78.5 DYSLIPIDEMIA: ICD-10-CM

## 2024-02-14 DIAGNOSIS — E61.1 IRON DEFICIENCY: ICD-10-CM

## 2024-02-14 PROCEDURE — 99214 OFFICE O/P EST MOD 30 MIN: CPT | Performed by: NURSE PRACTITIONER

## 2024-02-14 RX ORDER — BUPROPION HYDROCHLORIDE 150 MG/1
150 TABLET ORAL DAILY
Qty: 30 TABLET | Refills: 0 | Status: SHIPPED | OUTPATIENT
Start: 2024-02-14

## 2024-03-11 NOTE — PROGRESS NOTES
Subjective: Neuropathy with normal EMG    Patient ID: Grace Clement is a 49 y.o. female.    History of Present Illness   Ms. Clement is a 49-year-old  female who has been followed by this office by Dr. Seiple for neuropathic pain.  She presented for follow-up today.    She has been treated with gabapentin 300 mg 3 times daily.    She states that Gabapentin helps, but is not helping as much as it was and is wanting to possibly increase it today  I notified the patient that we could go up to 400 mg 3 times a day and she stated that would be beneficial.  She was in agreement with following up in 6 months for reevaluation.  She was notified that her EMG study was normal, she may have a small fiber neuropathy.          Last EMG 2/5/2020  EMG and Nerve Conduction Studies   The complete report includes the data sheets.    Referring Doctor: Jaye Ogden APRN    History: This patient complains of pain /cold feeling in feet and occasional sharp pains.   Results:   1.  The skin Temp was 91F   2.  The bilateral sural sensory stet were normal.   3.  The left peroneal motor study was normal   4.  The bilateral tibial motor studies were essentially normal .  The amplitude of the CMAP with proximal stimulation were low but this is felt likely due to technical factors associated with obesity.    5  EMG of the muscles tested in the bilateral L4-S1 myotomes were normal.     Impression:   This is a normal study of the lower extremities.     Joseph Seipel, M.D.                The following portions of the patient's history were reviewed and updated as appropriate: allergies, current medications, past family history, past medical history, past social history, past surgical history and problem list.    Family History   Problem Relation Age of Onset    Alcohol abuse Mother     COPD Mother     Depression Mother     Vision loss Mother     Early death Mother     Alcohol abuse Father     Early death Father     Alcohol abuse  Sister     Depression Sister     Miscarriages / Stillbirths Sister     Learning disabilities Sister     Mental illness Sister     Miscarriages / Stillbirths Sister     Alcohol abuse Brother     COPD Brother     Hyperlipidemia Brother     Drug abuse Brother     Early death Brother     Arthritis Brother     Drug abuse Brother     Cancer Maternal Uncle     Early death Maternal Uncle     Heart disease Maternal Grandmother     Early death Maternal Grandmother     Anxiety disorder Daughter     Asthma Daughter     Depression Daughter     Drug abuse Daughter     Learning disabilities Daughter     Mental illness Daughter        Past Medical History:   Diagnosis Date    Allergic     Arthritis     Asthma     COPD (chronic obstructive pulmonary disease)     Hyperlipidemia     Obesity     Vitamin D deficiency        Social History     Socioeconomic History    Marital status:    Tobacco Use    Smoking status: Former     Current packs/day: 0.00     Average packs/day: 1.5 packs/day for 35.1 years (52.7 ttl pk-yrs)     Types: Cigarettes     Start date: 3/10/1984     Quit date: 2019     Years since quittin.8    Smokeless tobacco: Never   Vaping Use    Vaping status: Former   Substance and Sexual Activity    Alcohol use: No    Drug use: Not Currently    Sexual activity: Not Currently     Partners: Male     Birth control/protection: Post-menopausal         Current Outpatient Medications:     albuterol (ACCUNEB) 0.63 MG/3ML nebulizer solution, Take 3 mL by nebulization Every 6 (Six) Hours As Needed for Wheezing., Disp: 360 mL, Rfl: 3    albuterol sulfate  (90 Base) MCG/ACT inhaler, Inhale 2 puffs As Needed., Disp: , Rfl:     aspirin 81 MG tablet, Take 1 tablet by mouth Daily., Disp: , Rfl:     atorvastatin (LIPITOR) 20 MG tablet, Take 1 tablet by mouth Daily., Disp: 90 tablet, Rfl: 1    Breztri Aerosphere 160-9-4.8 MCG/ACT aerosol inhaler, , Disp: , Rfl:     buPROPion XL (Wellbutrin XL) 150 MG 24 hr tablet,  Take 1 tablet by mouth Daily., Disp: 30 tablet, Rfl: 0    fexofenadine (ALLEGRA) 60 MG tablet, MUCINEX ALLERGY TABLET, Disp: , Rfl:     fluticasone (FLONASE) 50 MCG/ACT nasal spray, 2 sprays into the nostril(s) as directed by provider Daily., Disp: , Rfl:     meloxicam (MOBIC) 15 MG tablet, Take 1 tablet by mouth Daily., Disp: 90 tablet, Rfl: 1    montelukast (SINGULAIR) 10 MG tablet, Take 1 tablet by mouth Every Night., Disp: 90 tablet, Rfl: 1    vitamin D3 125 MCG (5000 UT) capsule capsule, Take 1 capsule by mouth Daily., Disp: , Rfl:     gabapentin (NEURONTIN) 400 MG capsule, Take 1 capsule by mouth 3 (Three) Times a Day for 180 days., Disp: 90 capsule, Rfl: 5    Review of Systems   Constitutional:  Positive for fatigue.   HENT:  Positive for dental problem.    Respiratory:  Positive for apnea, cough and shortness of breath.    Cardiovascular:  Positive for leg swelling.   Musculoskeletal:  Positive for back pain.   Allergic/Immunologic: Positive for environmental allergies.   Neurological:  Positive for numbness.   All other systems reviewed and are negative.         I have reviewed ROS completed by medical assistant.     Objective:    Neurologic Exam     Mental Status   Oriented to person, place, and time.   Speech: speech is normal     Cranial Nerves     CN III, IV, VI   Pupils are equal, round, and reactive to light.    Physical Exam  Vitals reviewed.   Constitutional:       Appearance: Normal appearance. She is well-developed and well-groomed. She is morbidly obese.   HENT:      Head: Normocephalic and atraumatic.      Right Ear: Hearing normal.      Left Ear: Hearing normal.      Nose: Nose normal.      Mouth/Throat:      Lips: Pink.      Mouth: Mucous membranes are moist.   Eyes:      General: Lids are normal. No visual field deficit.     Pupils: Pupils are equal, round, and reactive to light.   Cardiovascular:      Rate and Rhythm: Normal rate.   Pulmonary:      Effort: Pulmonary effort is normal.    Musculoskeletal:         General: Normal range of motion.      Cervical back: Normal range of motion.   Skin:     General: Skin is warm and dry.   Neurological:      Mental Status: She is alert and oriented to person, place, and time.      Cranial Nerves: No dysarthria or facial asymmetry.      Sensory: Sensory deficit (Bilateral foot tingling and burning) present.      Motor: No weakness or tremor.      Gait: Gait (Wide gait due to body habitus.) normal.   Psychiatric:         Attention and Perception: Attention and perception normal.         Mood and Affect: Mood normal.         Speech: Speech normal.         Behavior: Behavior is cooperative.         Thought Content: Thought content normal.     Assessment/Plan:  Discussion: The patient will be increased to 400 mg 3 times daily.  She is to call if that does not control her pain.  She will be scheduled back for a follow-up visit in 6 months.    Diagnoses and all orders for this visit:    1. Neuropathy (Primary)  -     gabapentin (NEURONTIN) 400 MG capsule; Take 1 capsule by mouth 3 (Three) Times a Day for 180 days.  Dispense: 90 capsule; Refill: 5          Return in about 6 months (around 9/12/2024) for Next scheduled follow up Annabelle Weaver .     I spent 17 minutes caring for Grace on this date of service. This time includes time spent by me in the following activities: obtaining and/or reviewing a separately obtained history, performing a medically appropriate examination and/or evaluation, counseling and educating the patient/family/caregiver, ordering medications, tests, or procedures, documenting information in the medical record, and independently interpreting results and communicating that information with the patient/family/caregiver.      This document has been electronically signed by SAMMY Monroy on March 12, 2024 17:05 EDT

## 2024-03-12 ENCOUNTER — OFFICE VISIT (OUTPATIENT)
Dept: NEUROLOGY | Facility: CLINIC | Age: 49
End: 2024-03-12
Payer: COMMERCIAL

## 2024-03-12 VITALS
WEIGHT: 293 LBS | HEART RATE: 80 BPM | BODY MASS INDEX: 51.91 KG/M2 | SYSTOLIC BLOOD PRESSURE: 114 MMHG | HEIGHT: 63 IN | DIASTOLIC BLOOD PRESSURE: 65 MMHG

## 2024-03-12 DIAGNOSIS — G62.9 NEUROPATHY: Primary | ICD-10-CM

## 2024-03-12 PROCEDURE — 99212 OFFICE O/P EST SF 10 MIN: CPT | Performed by: NURSE PRACTITIONER

## 2024-03-12 RX ORDER — GABAPENTIN 400 MG/1
400 CAPSULE ORAL 3 TIMES DAILY
Qty: 90 CAPSULE | Refills: 5 | Status: SHIPPED | OUTPATIENT
Start: 2024-03-12 | End: 2024-09-08

## 2024-03-14 ENCOUNTER — LAB (OUTPATIENT)
Dept: FAMILY MEDICINE CLINIC | Facility: CLINIC | Age: 49
End: 2024-03-14
Payer: COMMERCIAL

## 2024-03-14 ENCOUNTER — OFFICE VISIT (OUTPATIENT)
Dept: FAMILY MEDICINE CLINIC | Facility: CLINIC | Age: 49
End: 2024-03-14
Payer: COMMERCIAL

## 2024-03-14 VITALS
HEART RATE: 98 BPM | TEMPERATURE: 97.5 F | BODY MASS INDEX: 55.27 KG/M2 | DIASTOLIC BLOOD PRESSURE: 75 MMHG | SYSTOLIC BLOOD PRESSURE: 129 MMHG | OXYGEN SATURATION: 97 % | WEIGHT: 293 LBS

## 2024-03-14 DIAGNOSIS — F32.A DEPRESSION, UNSPECIFIED DEPRESSION TYPE: Primary | ICD-10-CM

## 2024-03-14 DIAGNOSIS — E78.5 DYSLIPIDEMIA: ICD-10-CM

## 2024-03-14 LAB
ALBUMIN SERPL-MCNC: 4.3 G/DL (ref 3.5–5.2)
ALBUMIN/GLOB SERPL: 1.8 G/DL
ALP SERPL-CCNC: 69 U/L (ref 39–117)
ALT SERPL W P-5'-P-CCNC: 19 U/L (ref 1–33)
ANION GAP SERPL CALCULATED.3IONS-SCNC: 11.3 MMOL/L (ref 5–15)
AST SERPL-CCNC: 22 U/L (ref 1–32)
BASOPHILS # BLD AUTO: 0.02 10*3/MM3 (ref 0–0.2)
BASOPHILS NFR BLD AUTO: 0.4 % (ref 0–1.5)
BILIRUB SERPL-MCNC: 1.1 MG/DL (ref 0–1.2)
BUN SERPL-MCNC: 11 MG/DL (ref 6–20)
BUN/CREAT SERPL: 12.4 (ref 7–25)
CALCIUM SPEC-SCNC: 9 MG/DL (ref 8.6–10.5)
CHLORIDE SERPL-SCNC: 105 MMOL/L (ref 98–107)
CHOLEST SERPL-MCNC: 120 MG/DL (ref 0–200)
CO2 SERPL-SCNC: 24.7 MMOL/L (ref 22–29)
CREAT SERPL-MCNC: 0.89 MG/DL (ref 0.57–1)
DEPRECATED RDW RBC AUTO: 48.2 FL (ref 37–54)
EGFRCR SERPLBLD CKD-EPI 2021: 78.6 ML/MIN/1.73
EOSINOPHIL # BLD AUTO: 0.16 10*3/MM3 (ref 0–0.4)
EOSINOPHIL NFR BLD AUTO: 3 % (ref 0.3–6.2)
ERYTHROCYTE [DISTWIDTH] IN BLOOD BY AUTOMATED COUNT: 14.4 % (ref 12.3–15.4)
GLOBULIN UR ELPH-MCNC: 2.4 GM/DL
GLUCOSE SERPL-MCNC: 109 MG/DL (ref 65–99)
HCT VFR BLD AUTO: 34.2 % (ref 34–46.6)
HDLC SERPL-MCNC: 34 MG/DL (ref 40–60)
HGB BLD-MCNC: 11.3 G/DL (ref 12–15.9)
IMM GRANULOCYTES # BLD AUTO: 0.02 10*3/MM3 (ref 0–0.05)
IMM GRANULOCYTES NFR BLD AUTO: 0.4 % (ref 0–0.5)
LDLC SERPL CALC-MCNC: 60 MG/DL (ref 0–100)
LDLC/HDLC SERPL: 1.66 {RATIO}
LYMPHOCYTES # BLD AUTO: 1.4 10*3/MM3 (ref 0.7–3.1)
LYMPHOCYTES NFR BLD AUTO: 26 % (ref 19.6–45.3)
MCH RBC QN AUTO: 30.5 PG (ref 26.6–33)
MCHC RBC AUTO-ENTMCNC: 33 G/DL (ref 31.5–35.7)
MCV RBC AUTO: 92.4 FL (ref 79–97)
MONOCYTES # BLD AUTO: 0.36 10*3/MM3 (ref 0.1–0.9)
MONOCYTES NFR BLD AUTO: 6.7 % (ref 5–12)
NEUTROPHILS NFR BLD AUTO: 3.42 10*3/MM3 (ref 1.7–7)
NEUTROPHILS NFR BLD AUTO: 63.5 % (ref 42.7–76)
NRBC BLD AUTO-RTO: 0 /100 WBC (ref 0–0.2)
PLATELET # BLD AUTO: 230 10*3/MM3 (ref 140–450)
PMV BLD AUTO: 10 FL (ref 6–12)
POTASSIUM SERPL-SCNC: 4.1 MMOL/L (ref 3.5–5.2)
PROT SERPL-MCNC: 6.7 G/DL (ref 6–8.5)
RBC # BLD AUTO: 3.7 10*6/MM3 (ref 3.77–5.28)
SODIUM SERPL-SCNC: 141 MMOL/L (ref 136–145)
TRIGL SERPL-MCNC: 148 MG/DL (ref 0–150)
VLDLC SERPL-MCNC: 26 MG/DL (ref 5–40)
WBC NRBC COR # BLD AUTO: 5.38 10*3/MM3 (ref 3.4–10.8)

## 2024-03-14 PROCEDURE — 36415 COLL VENOUS BLD VENIPUNCTURE: CPT

## 2024-03-14 PROCEDURE — 99213 OFFICE O/P EST LOW 20 MIN: CPT | Performed by: NURSE PRACTITIONER

## 2024-03-14 PROCEDURE — 85025 COMPLETE CBC W/AUTO DIFF WBC: CPT | Performed by: NURSE PRACTITIONER

## 2024-03-14 PROCEDURE — 80061 LIPID PANEL: CPT | Performed by: NURSE PRACTITIONER

## 2024-03-14 PROCEDURE — 80053 COMPREHEN METABOLIC PANEL: CPT | Performed by: NURSE PRACTITIONER

## 2024-03-14 RX ORDER — SERTRALINE HYDROCHLORIDE 25 MG/1
25 TABLET, FILM COATED ORAL DAILY
Qty: 30 TABLET | Refills: 0 | Status: SHIPPED | OUTPATIENT
Start: 2024-03-14

## 2024-03-14 NOTE — PROGRESS NOTES
Subjective     Grace Clement is a 49 y.o. female.     History of Present Illness  Pt is here today for a 1 mo follow up on depression.   She was started on wellbutrin XL 150mg daily  She states she has not seen much of a change.   She would like to try a different medication  She gets overwhelming feelings everyday.  She cries often  Denies SI or HI         The following portions of the patient's history were reviewed and updated as appropriate: allergies, current medications, past family history, past medical history, past social history, past surgical history, and problem list.    Review of Systems   Constitutional:  Negative for chills, fatigue and fever.   Respiratory:  Positive for shortness of breath. Negative for chest tightness.    Cardiovascular:  Negative for chest pain and palpitations.   Neurological:  Negative for dizziness and headache.   Psychiatric/Behavioral:  Positive for depressed mood. Negative for self-injury and suicidal ideas. The patient is nervous/anxious.        Objective     /75 (BP Location: Left arm, Patient Position: Sitting, Cuff Size: Large Adult)   Pulse 98   Temp 97.5 °F (36.4 °C) (Tympanic)   Wt (!) 142 kg (312 lb)   SpO2 97%   BMI 55.27 kg/m²     Current Outpatient Medications on File Prior to Visit   Medication Sig Dispense Refill    albuterol (ACCUNEB) 0.63 MG/3ML nebulizer solution Take 3 mL by nebulization Every 6 (Six) Hours As Needed for Wheezing. 360 mL 3    albuterol sulfate  (90 Base) MCG/ACT inhaler Inhale 2 puffs As Needed.      aspirin 81 MG tablet Take 1 tablet by mouth Daily.      atorvastatin (LIPITOR) 20 MG tablet Take 1 tablet by mouth Daily. 90 tablet 1    Breztri Aerosphere 160-9-4.8 MCG/ACT aerosol inhaler       fexofenadine (ALLEGRA) 60 MG tablet MUCINEX ALLERGY TABLET      fluticasone (FLONASE) 50 MCG/ACT nasal spray 2 sprays into the nostril(s) as directed by provider Daily.      gabapentin (NEURONTIN) 400 MG capsule Take 1 capsule by  mouth 3 (Three) Times a Day for 180 days. 90 capsule 5    meloxicam (MOBIC) 15 MG tablet Take 1 tablet by mouth Daily. 90 tablet 1    montelukast (SINGULAIR) 10 MG tablet Take 1 tablet by mouth Every Night. 90 tablet 1    vitamin D3 125 MCG (5000 UT) capsule capsule Take 1 capsule by mouth Daily.      [DISCONTINUED] buPROPion XL (Wellbutrin XL) 150 MG 24 hr tablet Take 1 tablet by mouth Daily. 30 tablet 0     No current facility-administered medications on file prior to visit.                 Physical Exam  Constitutional:       General: She is not in acute distress.     Appearance: Normal appearance. She is obese. She is not ill-appearing.   HENT:      Head: Normocephalic and atraumatic.   Pulmonary:      Effort: Pulmonary effort is normal. No respiratory distress.   Skin:     General: Skin is warm and dry.   Neurological:      General: No focal deficit present.      Mental Status: She is alert and oriented to person, place, and time.   Psychiatric:         Mood and Affect: Mood normal.         Behavior: Behavior normal.         Thought Content: Thought content normal.         Judgment: Judgment normal.           Assessment & Plan     Diagnoses and all orders for this visit:    1. Depression, unspecified depression type (Primary)  Comments:  no change with wellbutrin  start zoloft  denies SI or HI  f/u 1 mo  Orders:  -     sertraline (Zoloft) 25 MG tablet; Take 1 tablet by mouth Daily.  Dispense: 30 tablet; Refill: 0

## 2024-03-16 DIAGNOSIS — F32.A DEPRESSION, UNSPECIFIED DEPRESSION TYPE: ICD-10-CM

## 2024-03-18 RX ORDER — BUPROPION HYDROCHLORIDE 150 MG/1
150 TABLET ORAL DAILY
Qty: 30 TABLET | Refills: 0 | OUTPATIENT
Start: 2024-03-18

## 2024-03-20 RX ORDER — TRAZODONE HYDROCHLORIDE 50 MG/1
50 TABLET ORAL NIGHTLY
Qty: 30 TABLET | Refills: 0 | Status: SHIPPED | OUTPATIENT
Start: 2024-03-20

## 2024-04-15 ENCOUNTER — OFFICE VISIT (OUTPATIENT)
Dept: FAMILY MEDICINE CLINIC | Facility: CLINIC | Age: 49
End: 2024-04-15
Payer: COMMERCIAL

## 2024-04-15 VITALS
DIASTOLIC BLOOD PRESSURE: 79 MMHG | TEMPERATURE: 97.9 F | WEIGHT: 293 LBS | BODY MASS INDEX: 53.67 KG/M2 | HEART RATE: 79 BPM | OXYGEN SATURATION: 97 % | SYSTOLIC BLOOD PRESSURE: 141 MMHG

## 2024-04-15 DIAGNOSIS — G47.00 INSOMNIA, UNSPECIFIED TYPE: ICD-10-CM

## 2024-04-15 DIAGNOSIS — F32.A DEPRESSION, UNSPECIFIED DEPRESSION TYPE: Primary | ICD-10-CM

## 2024-04-15 DIAGNOSIS — R10.13 EPIGASTRIC PAIN: ICD-10-CM

## 2024-04-15 PROCEDURE — 99214 OFFICE O/P EST MOD 30 MIN: CPT | Performed by: NURSE PRACTITIONER

## 2024-04-15 RX ORDER — SERTRALINE HYDROCHLORIDE 25 MG/1
25 TABLET, FILM COATED ORAL DAILY
Qty: 90 TABLET | Refills: 1 | Status: SHIPPED | OUTPATIENT
Start: 2024-04-15

## 2024-04-15 RX ORDER — OMEPRAZOLE 40 MG/1
40 CAPSULE, DELAYED RELEASE ORAL DAILY
Qty: 90 CAPSULE | Refills: 3 | Status: SHIPPED | OUTPATIENT
Start: 2024-04-15

## 2024-04-15 NOTE — PROGRESS NOTES
Subjective     Grace Clement is a 49 y.o. female.     History of Present Illness  Pt is here today for a 1 mo follow up on depression and insomnia  Gets occasional epigastric pain that comes and goes  Seems to be worse in the evening after dinner.   She takes tums which helps  She states she started her cycle last night and she hasn't had cycles in over a year  She will call her GYN    Depression- last month pt was started on zoloft 25mg daily. She was previously on wellbutrin, which didn't seem to help. She has seen great improvement with the zoloft. Denies any side effects. Denies SI or HI    Insomnia- pt was started on trazodone 50mg nightly prn. She states it has helped with sleep when she remembers to take it.       The following portions of the patient's history were reviewed and updated as appropriate: allergies, current medications, past family history, past medical history, past social history, past surgical history, and problem list.    Review of Systems   Constitutional:  Negative for chills, fatigue and fever.   Respiratory:  Negative for chest tightness and shortness of breath.    Cardiovascular:  Positive for chest pain. Negative for palpitations.   Neurological:  Negative for dizziness and headache.   Psychiatric/Behavioral:  Negative for self-injury, suicidal ideas, depressed mood and stress. The patient is not nervous/anxious.        Objective     /79 (BP Location: Left arm, Patient Position: Sitting, Cuff Size: Large Adult)   Pulse 79   Temp 97.9 °F (36.6 °C) (Tympanic)   Wt (!) 137 kg (303 lb)   SpO2 97%   BMI 53.67 kg/m²     Current Outpatient Medications on File Prior to Visit   Medication Sig Dispense Refill    albuterol (ACCUNEB) 0.63 MG/3ML nebulizer solution Take 3 mL by nebulization Every 6 (Six) Hours As Needed for Wheezing. 360 mL 3    albuterol sulfate  (90 Base) MCG/ACT inhaler Inhale 2 puffs As Needed.      aspirin 81 MG tablet Take 1 tablet by mouth Daily.       atorvastatin (LIPITOR) 20 MG tablet Take 1 tablet by mouth Daily. 90 tablet 1    Breztri Aerosphere 160-9-4.8 MCG/ACT aerosol inhaler       fexofenadine (ALLEGRA) 60 MG tablet MUCINEX ALLERGY TABLET      fluticasone (FLONASE) 50 MCG/ACT nasal spray 2 sprays into the nostril(s) as directed by provider Daily.      gabapentin (NEURONTIN) 400 MG capsule Take 1 capsule by mouth 3 (Three) Times a Day for 180 days. 90 capsule 5    meloxicam (MOBIC) 15 MG tablet Take 1 tablet by mouth Daily. 90 tablet 1    montelukast (SINGULAIR) 10 MG tablet Take 1 tablet by mouth Every Night. 90 tablet 1    traZODone (DESYREL) 50 MG tablet Take 1 tablet by mouth Every Night. 30 tablet 0    vitamin D3 125 MCG (5000 UT) capsule capsule Take 1 capsule by mouth Daily.      [DISCONTINUED] sertraline (Zoloft) 25 MG tablet Take 1 tablet by mouth Daily. 30 tablet 0     No current facility-administered medications on file prior to visit.                 Physical Exam  Constitutional:       General: She is not in acute distress.     Appearance: Normal appearance. She is obese. She is not ill-appearing.   HENT:      Head: Normocephalic and atraumatic.   Cardiovascular:      Rate and Rhythm: Normal rate and regular rhythm.      Heart sounds: No murmur heard.  Pulmonary:      Effort: Pulmonary effort is normal. No respiratory distress.      Breath sounds: Normal breath sounds.   Skin:     General: Skin is warm and dry.   Neurological:      General: No focal deficit present.      Mental Status: She is alert and oriented to person, place, and time.   Psychiatric:         Mood and Affect: Mood normal.         Behavior: Behavior normal.         Thought Content: Thought content normal.         Judgment: Judgment normal.         Assessment & Plan     Diagnoses and all orders for this visit:    1. Depression, unspecified depression type (Primary)  Comments:  improved  cont zoloft  denies SI or HI  Orders:  -     sertraline (Zoloft) 25 MG tablet; Take 1  tablet by mouth Daily.  Dispense: 90 tablet; Refill: 1    2. Epigastric pain  Comments:  appears to be reflux  start omeprazole  Orders:  -     omeprazole (priLOSEC) 40 MG capsule; Take 1 capsule by mouth Daily.  Dispense: 90 capsule; Refill: 3    3. Insomnia, unspecified type  Comments:  stable  cont trazodone

## 2024-05-06 RX ORDER — ATORVASTATIN CALCIUM 20 MG/1
20 TABLET, FILM COATED ORAL DAILY
Qty: 90 TABLET | Refills: 1 | Status: SHIPPED | OUTPATIENT
Start: 2024-05-06
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
## 2024-07-10 ENCOUNTER — OFFICE VISIT (OUTPATIENT)
Dept: FAMILY MEDICINE CLINIC | Facility: CLINIC | Age: 49
End: 2024-07-10
Payer: MEDICARE

## 2024-07-10 VITALS
SYSTOLIC BLOOD PRESSURE: 144 MMHG | DIASTOLIC BLOOD PRESSURE: 81 MMHG | WEIGHT: 293 LBS | BODY MASS INDEX: 53.32 KG/M2 | HEART RATE: 83 BPM | TEMPERATURE: 97.8 F | OXYGEN SATURATION: 96 %

## 2024-07-10 DIAGNOSIS — M79.604 RIGHT LEG PAIN: ICD-10-CM

## 2024-07-10 DIAGNOSIS — H93.13 TINNITUS OF BOTH EARS: Primary | ICD-10-CM

## 2024-07-10 DIAGNOSIS — R07.9 CHEST PAIN, UNSPECIFIED TYPE: ICD-10-CM

## 2024-07-10 PROCEDURE — 1159F MED LIST DOCD IN RCRD: CPT | Performed by: NURSE PRACTITIONER

## 2024-07-10 PROCEDURE — 99214 OFFICE O/P EST MOD 30 MIN: CPT | Performed by: NURSE PRACTITIONER

## 2024-07-10 PROCEDURE — 1160F RVW MEDS BY RX/DR IN RCRD: CPT | Performed by: NURSE PRACTITIONER

## 2024-07-10 PROCEDURE — 1126F AMNT PAIN NOTED NONE PRSNT: CPT | Performed by: NURSE PRACTITIONER

## 2024-07-10 RX ORDER — MULTIPLE VITAMINS W/ MINERALS TAB 9MG-400MCG
1 TAB ORAL DAILY
COMMUNITY

## 2024-07-10 NOTE — PATIENT INSTRUCTIONS
Cont mobic  Go to ER for worsening CP  Referral to cardiology  Referral to PT  Referral to ENT  Cont flonase

## 2024-07-23 ENCOUNTER — TREATMENT (OUTPATIENT)
Dept: PHYSICAL THERAPY | Facility: CLINIC | Age: 49
End: 2024-07-23
Payer: MEDICARE

## 2024-07-23 DIAGNOSIS — M79.604 RIGHT LEG PAIN: Primary | ICD-10-CM

## 2024-07-23 DIAGNOSIS — M53.3 SI (SACROILIAC) JOINT DYSFUNCTION: ICD-10-CM

## 2024-07-23 DIAGNOSIS — R26.2 DIFFICULTY WALKING: ICD-10-CM

## 2024-07-23 PROCEDURE — 97110 THERAPEUTIC EXERCISES: CPT | Performed by: PHYSICAL THERAPIST

## 2024-07-23 PROCEDURE — 97162 PT EVAL MOD COMPLEX 30 MIN: CPT | Performed by: PHYSICAL THERAPIST
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
## 2024-07-28 DIAGNOSIS — L60.0 INGROWN TOENAIL: Primary | ICD-10-CM

## 2024-08-01 ENCOUNTER — TREATMENT (OUTPATIENT)
Dept: PHYSICAL THERAPY | Facility: CLINIC | Age: 49
End: 2024-08-01
Payer: MEDICARE

## 2024-08-01 DIAGNOSIS — R26.2 DIFFICULTY WALKING: ICD-10-CM

## 2024-08-01 DIAGNOSIS — M79.604 RIGHT LEG PAIN: Primary | ICD-10-CM

## 2024-08-01 DIAGNOSIS — M53.3 SI (SACROILIAC) JOINT DYSFUNCTION: ICD-10-CM

## 2024-08-01 NOTE — PROGRESS NOTES
"Physical Therapy Treatment Note  [address] [place] [address]      Patient: Grace Clement   : 1975  Diagnosis/ICD-10 Code:  Right leg pain [M79.604]  Referring practitioner: SHAKILA Cavazos  Date of Initial Visit: Type: THERAPY  Noted: 2024  Today's Date: 2024  Patient seen for 2 sessions           Visit Diagnoses:     ICD-10-CM ICD-9-CM   1. Right leg pain  M79.604 729.5   2. Difficulty walking  R26.2 719.7   3. SI (sacroiliac) joint dysfunction  M53.3 724.6       Subjective Grace Clement reports: the leg is feeling better. It doesn't start hurting until later in the day.     Pain: none at present; 5 at worst    Objective     See Exercise, Manual, and Modality Logs for complete treatment.     Patient Education: cues for therex/NMR; discussed performing tasks in sitting or laying as tolerated; updated HEP pictures    Assessment/Plan Pt took her O2 off for her exs and noted that she doesn't need it  unless she does too much. Pt was instr to let me know if she needs it, but was able to tolerate the full session without it on. Pt tolerated well with additions to tasks, but declined doing standing activities at the end as her legs felt \"like jelly\".  Pt declined modalities at end of session.     Progress per Plan of Care and Progress strengthening /stabilization /functional activity            Timed:         Manual Therapy:         mins  60075;     Therapeutic Exercise:    28   mins  66750;     Neuromuscular Marino:  10      mins  36870;    Therapeutic Activity:          mins  65785;     Gait Training:           mins  28649;     Ultrasound:          mins  84772;    Ionto                                   mins   49558  Self Care                            mins   82601    Un-Timed:  Electrical Stimulation:         mins  88746 ( );  Traction          mins 17675  Canalith Repos                   mins  63360  Dry Needle 1-2 ms      ___  mins 68968  Dry Needle  " 3+ ms              mins 86692  Low Eval          mins  00569  Mod Eval          Mins  77073  High Eval                            Mins  47817  Re-Eval                               mins  64895    Timed Treatment:   38   mins   Total Treatment:     38   mins          Louisa Hampton PT    Physical Therapist

## 2024-08-06 ENCOUNTER — OFFICE VISIT (OUTPATIENT)
Dept: FAMILY MEDICINE CLINIC | Facility: CLINIC | Age: 49
End: 2024-08-06
Payer: MEDICARE

## 2024-08-06 ENCOUNTER — LAB (OUTPATIENT)
Dept: FAMILY MEDICINE CLINIC | Facility: CLINIC | Age: 49
End: 2024-08-06
Payer: MEDICARE

## 2024-08-06 ENCOUNTER — TELEPHONE (OUTPATIENT)
Dept: PHYSICAL THERAPY | Facility: CLINIC | Age: 49
End: 2024-08-06

## 2024-08-06 VITALS
OXYGEN SATURATION: 97 % | HEIGHT: 63 IN | BODY MASS INDEX: 51.74 KG/M2 | SYSTOLIC BLOOD PRESSURE: 128 MMHG | HEART RATE: 64 BPM | DIASTOLIC BLOOD PRESSURE: 65 MMHG | TEMPERATURE: 97 F | WEIGHT: 292 LBS

## 2024-08-06 DIAGNOSIS — R50.9 FEVER, UNSPECIFIED FEVER CAUSE: Primary | ICD-10-CM

## 2024-08-06 LAB
BASOPHILS # BLD AUTO: 0.04 10*3/MM3 (ref 0–0.2)
BASOPHILS NFR BLD AUTO: 0.2 % (ref 0–1.5)
DEPRECATED RDW RBC AUTO: 47.2 FL (ref 37–54)
EOSINOPHIL # BLD AUTO: 0.02 10*3/MM3 (ref 0–0.4)
EOSINOPHIL NFR BLD AUTO: 0.1 % (ref 0.3–6.2)
ERYTHROCYTE [DISTWIDTH] IN BLOOD BY AUTOMATED COUNT: 14.5 % (ref 12.3–15.4)
EXPIRATION DATE: NORMAL
HCT VFR BLD AUTO: 30.7 % (ref 34–46.6)
HGB BLD-MCNC: 9.8 G/DL (ref 12–15.9)
IMM GRANULOCYTES # BLD AUTO: 0.09 10*3/MM3 (ref 0–0.05)
IMM GRANULOCYTES NFR BLD AUTO: 0.5 % (ref 0–0.5)
INTERNAL CONTROL: NORMAL
LYMPHOCYTES # BLD AUTO: 1.1 10*3/MM3 (ref 0.7–3.1)
LYMPHOCYTES NFR BLD AUTO: 6.1 % (ref 19.6–45.3)
Lab: NORMAL
MCH RBC QN AUTO: 28.8 PG (ref 26.6–33)
MCHC RBC AUTO-ENTMCNC: 31.9 G/DL (ref 31.5–35.7)
MCV RBC AUTO: 90.3 FL (ref 79–97)
MONOCYTES # BLD AUTO: 1.01 10*3/MM3 (ref 0.1–0.9)
MONOCYTES NFR BLD AUTO: 5.6 % (ref 5–12)
NEUTROPHILS NFR BLD AUTO: 15.67 10*3/MM3 (ref 1.7–7)
NEUTROPHILS NFR BLD AUTO: 87.5 % (ref 42.7–76)
NRBC BLD AUTO-RTO: 0 /100 WBC (ref 0–0.2)
PLATELET # BLD AUTO: 229 10*3/MM3 (ref 140–450)
PMV BLD AUTO: 10.3 FL (ref 6–12)
RBC # BLD AUTO: 3.4 10*6/MM3 (ref 3.77–5.28)
SARS-COV-2 AG UPPER RESP QL IA.RAPID: NOT DETECTED
WBC NRBC COR # BLD AUTO: 17.93 10*3/MM3 (ref 3.4–10.8)

## 2024-08-06 PROCEDURE — 85025 COMPLETE CBC W/AUTO DIFF WBC: CPT | Performed by: NURSE PRACTITIONER

## 2024-08-06 PROCEDURE — 1126F AMNT PAIN NOTED NONE PRSNT: CPT | Performed by: NURSE PRACTITIONER

## 2024-08-06 PROCEDURE — 36415 COLL VENOUS BLD VENIPUNCTURE: CPT | Performed by: NURSE PRACTITIONER

## 2024-08-06 PROCEDURE — 87426 SARSCOV CORONAVIRUS AG IA: CPT | Performed by: NURSE PRACTITIONER

## 2024-08-06 PROCEDURE — 99213 OFFICE O/P EST LOW 20 MIN: CPT | Performed by: NURSE PRACTITIONER

## 2024-08-06 NOTE — PROGRESS NOTES
Chief Complaint  Fever (Since Sat ), Dizziness, Fatigue, and Generalized Body Aches (Yesterday started feeling alittle better , but woke up this morning and felt worse )    Subjective        Grace Clement presents to [facility]  History of Present Illness  Grace is a 49-year-old female presenting today with complaints of fever and myalgia. Her symptoms started Saturday with fatigue. She ran a temp of 104F on Sunday. Yesterday she felt a lot better, but woke up early this morning with a temp of 101 again. She reports some dizziness. She had COPD, but denies any more SOA that she normally has. She reports some right sided pleuritic pain.         The following portions of the patient's history were reviewed and updated as appropriate: allergies, current medications, past family history, past medical history, past social history, past surgical history and problem list.    Allergies   Allergen Reactions    Codeine Hives, Itching and Rash       Patient Active Problem List   Diagnosis    Anxiety    Asthma    Acute exacerbation of chronic obstructive pulmonary disease    Dyslipidemia    Hand tingling    Iron deficiency    Pain of foot    Panic attack    Sleeping difficulties    Unspecified sprain of left wrist, initial encounter    Vitamin D deficiency    Screening for colon cancer    Malabsorption due to intolerance, not elsewhere classified       Current Outpatient Medications   Medication Instructions    albuterol (ACCUNEB) 0.63 mg, Nebulization, Every 6 Hours PRN    albuterol sulfate  (90 Base) MCG/ACT inhaler 2 puffs, Inhalation, As Needed    aspirin 81 mg, Oral, Daily    atorvastatin (LIPITOR) 20 mg, Oral, Daily    Breztri Aerosphere 160-9-4.8 MCG/ACT aerosol inhaler No dose, route, or frequency recorded.    fexofenadine (ALLEGRA) 60 MG tablet MUCINEX ALLERGY TABLET    fluticasone (FLONASE) 50 MCG/ACT nasal spray 2 sprays, Nasal, Daily    gabapentin (NEURONTIN) 400 mg,  "Oral, 3 Times Daily    meloxicam (MOBIC) 15 mg, Oral, Daily    montelukast (SINGULAIR) 10 mg, Oral, Nightly    multivitamin with minerals (MULTIVITAL PO) 1 tablet, Oral, Daily    omeprazole (PRILOSEC) 40 mg, Oral, Daily    sertraline (ZOLOFT) 25 mg, Oral, Daily    traZODone (DESYREL) 50 mg, Oral, Nightly    vitamin D3 5,000 Units, Oral, Daily          Objective   Vital Signs:  /65 (BP Location: Left arm, Patient Position: Sitting, Cuff Size: Large Adult)   Pulse 64   Temp 97 °F (36.1 °C) (Temporal)   Ht 160 cm (63\")   Wt 132 kg (292 lb)   SpO2 97%   BMI 51.73 kg/m²   Estimated body mass index is 51.73 kg/m² as calculated from the following:    Height as of this encounter: 160 cm (63\").    Weight as of this encounter: 132 kg (292 lb).               Review of Systems   Constitutional:  Positive for fatigue and fever. Negative for appetite change and chills.   HENT:  Negative for congestion, ear pain, postnasal drip, rhinorrhea, sinus pressure, sinus pain, sneezing, sore throat and tinnitus.    Eyes:  Negative for redness.   Respiratory:  Negative for cough, chest tightness, shortness of breath and wheezing.    Cardiovascular:  Negative for chest pain.   Gastrointestinal:  Negative for nausea and vomiting.   Musculoskeletal:  Positive for myalgias.   Allergic/Immunologic: Negative for environmental allergies.   Neurological:  Positive for dizziness. Negative for syncope, weakness, light-headedness and headaches.        Physical Exam  Constitutional:       Appearance: Normal appearance.   HENT:      Head: Normocephalic and atraumatic.      Right Ear: Tympanic membrane, ear canal and external ear normal.      Left Ear: Tympanic membrane, ear canal and external ear normal.      Nose: Nose normal.      Mouth/Throat:      Mouth: Mucous membranes are moist.      Pharynx: Oropharynx is clear. Posterior oropharyngeal erythema present.   Eyes:      Extraocular Movements: Extraocular movements intact.      " Conjunctiva/sclera: Conjunctivae normal.      Pupils: Pupils are equal, round, and reactive to light.   Cardiovascular:      Rate and Rhythm: Normal rate and regular rhythm.   Pulmonary:      Effort: Pulmonary effort is normal.      Breath sounds: Normal breath sounds.   Musculoskeletal:      Cervical back: Normal range of motion and neck supple.   Skin:     General: Skin is warm and dry.   Neurological:      Mental Status: She is alert and oriented to person, place, and time.   Psychiatric:         Mood and Affect: Mood normal.         Behavior: Behavior normal.         Thought Content: Thought content normal.         Judgment: Judgment normal.        Result Review :                   Assessment and Plan   Diagnoses and all orders for this visit:    1. Fever, unspecified fever cause (Primary)  Comments:  likely viral  neg for covid  will get CBC  will consider CXR  push fluids  tyelnol for fever  Orders:  -     POCT PATRICK SARS-CoV-2 Antigen YOSEPH  -     CBC & Differential             Follow Up   No follow-ups on file.  Patient was given instructions and counseling regarding her condition or for health maintenance advice. Please see specific information pulled into the AVS if appropriate.

## 2024-08-07 RX ORDER — DOXYCYCLINE HYCLATE 100 MG/1
100 CAPSULE ORAL 2 TIMES DAILY
Qty: 20 CAPSULE | Refills: 0 | Status: SHIPPED | OUTPATIENT
Start: 2024-08-07

## 2024-08-13 ENCOUNTER — TELEPHONE (OUTPATIENT)
Dept: ORTHOPEDICS | Facility: OTHER | Age: 49
End: 2024-08-13
Payer: MEDICARE

## 2024-08-13 NOTE — TELEPHONE ENCOUNTER
PATIENT'S GRANDSON WAS SUSPENDED FROM SCHOOL AND HE IS ONLY 5 AND IS HOME WITH HER.  CANCELLED TODAY.

## 2024-08-22 ENCOUNTER — TELEPHONE (OUTPATIENT)
Dept: PHYSICAL THERAPY | Facility: CLINIC | Age: 49
End: 2024-08-22

## 2024-08-22 NOTE — TELEPHONE ENCOUNTER
PATIENT WAS A NO SHOW FOR APPT TODAY.  CALLED AND SPOKE WITH PATIENT AND SHE STATED SHE FORGOT THE APPT.  SHE ALSO STATED TO CANCEL HER NEXT 4 SCHED APPTS DUE TO A LOT GOING ON WITH FAMILY.  SHE SAID SHE'D CALL BACK AND RESCHEDULE.

## 2024-09-12 ENCOUNTER — TELEPHONE (OUTPATIENT)
Dept: CARDIOLOGY | Facility: CLINIC | Age: 49
End: 2024-09-12
Payer: MEDICARE

## 2024-09-12 NOTE — TELEPHONE ENCOUNTER
Caller: Grace Clement    Relationship to patient: Self    Best call back number: [phone]    Patient is needing: PATIENT IS ONE THAT IS BEING RESCHEDULED FOR DR. VILLA. NEW APPT NOT IN CHART YET. PATIENT CAN'T REMEMBER WHAT TIME THE NEW APPT WAS GOING TO BE. PLEASE CALL TO LET HER KNOW.

## 2024-09-20 ENCOUNTER — PATIENT ROUNDING (BHMG ONLY) (OUTPATIENT)
Dept: CARDIOLOGY | Facility: CLINIC | Age: 49
End: 2024-09-20
Payer: MEDICARE

## 2024-09-20 ENCOUNTER — OFFICE VISIT (OUTPATIENT)
Dept: PODIATRY | Facility: CLINIC | Age: 49
End: 2024-09-20
Payer: MEDICARE

## 2024-09-20 ENCOUNTER — OFFICE VISIT (OUTPATIENT)
Dept: CARDIOLOGY | Facility: CLINIC | Age: 49
End: 2024-09-20
Payer: MEDICARE

## 2024-09-20 VITALS
WEIGHT: 292 LBS | BODY MASS INDEX: 51.74 KG/M2 | HEART RATE: 83 BPM | RESPIRATION RATE: 20 BRPM | HEIGHT: 63 IN | OXYGEN SATURATION: 96 %

## 2024-09-20 VITALS
HEIGHT: 63 IN | BODY MASS INDEX: 51.91 KG/M2 | DIASTOLIC BLOOD PRESSURE: 82 MMHG | WEIGHT: 293 LBS | HEART RATE: 82 BPM | SYSTOLIC BLOOD PRESSURE: 128 MMHG | RESPIRATION RATE: 18 BRPM

## 2024-09-20 DIAGNOSIS — M79.674 PAIN IN TOES OF BOTH FEET: Primary | ICD-10-CM

## 2024-09-20 DIAGNOSIS — R06.09 DOE (DYSPNEA ON EXERTION): Primary | ICD-10-CM

## 2024-09-20 DIAGNOSIS — L60.3 ONYCHODYSTROPHY: ICD-10-CM

## 2024-09-20 DIAGNOSIS — G62.9 NEUROPATHY: ICD-10-CM

## 2024-09-20 DIAGNOSIS — M79.675 PAIN IN TOES OF BOTH FEET: Primary | ICD-10-CM

## 2024-09-20 DIAGNOSIS — R26.81 UNSTEADINESS ON FEET: ICD-10-CM

## 2024-09-20 PROCEDURE — 99204 OFFICE O/P NEW MOD 45 MIN: CPT | Performed by: INTERNAL MEDICINE

## 2024-09-20 RX ORDER — FERROUS SULFATE 324(65)MG
324 TABLET, DELAYED RELEASE (ENTERIC COATED) ORAL
COMMUNITY

## 2024-09-25 DIAGNOSIS — G62.9 NEUROPATHY: ICD-10-CM

## 2024-09-26 ENCOUNTER — EXTERNAL PBMM DATA (OUTPATIENT)
Dept: PHARMACY | Facility: OTHER | Age: 49
End: 2024-09-26
Payer: MEDICARE

## 2024-09-26 RX ORDER — GABAPENTIN 400 MG/1
400 CAPSULE ORAL 3 TIMES DAILY
Qty: 90 CAPSULE | Refills: 5 | Status: SHIPPED | OUTPATIENT
Start: 2024-09-26

## 2024-10-12 DIAGNOSIS — J44.9 CHRONIC OBSTRUCTIVE PULMONARY DISEASE, UNSPECIFIED COPD TYPE: ICD-10-CM

## 2024-10-14 RX ORDER — MONTELUKAST SODIUM 10 MG/1
10 TABLET ORAL NIGHTLY
Qty: 90 TABLET | Refills: 1 | Status: SHIPPED | OUTPATIENT
Start: 2024-10-14

## 2024-10-14 NOTE — PROGRESS NOTES
Subjective   The ABCs of the Annual Wellness Visit  Medicare Wellness Visit      Grace Clement is a 49 y.o. patient who presents for a Medicare Wellness Visit.    The following portions of the patient's history were reviewed and   updated as appropriate: allergies, current medications, past family history, past medical history, past social history, past surgical history, and problem list.    Compared to one year ago, the patient's physical   health is better.  Compared to one year ago, the patient's mental   health is better.    Recent Hospitalizations:  She was not admitted to the hospital during the last year.     Current Medical Providers:  Patient Care Team:  Jaye Ogden APRN as PCP - General (Nurse Practitioner)  Oleksandr Resendiz MD as Consulting Physician (Hematology and Oncology)  Jeremias Engle MD as Consulting Physician (Pulmonary Disease)  Annabelle Weaver APRN as Nurse Practitioner (Nurse Practitioner)  Lauren Munson APRN as Nurse Practitioner (Orthopedic Surgery)    Outpatient Medications Prior to Visit   Medication Sig Dispense Refill   • albuterol (ACCUNEB) 0.63 MG/3ML nebulizer solution Take 3 mL by nebulization Every 6 (Six) Hours As Needed for Wheezing. 360 mL 3   • albuterol sulfate  (90 Base) MCG/ACT inhaler Inhale 2 puffs As Needed.     • aspirin 81 MG tablet Take 1 tablet by mouth Daily.     • atorvastatin (LIPITOR) 20 MG tablet TAKE 1 TABLET BY MOUTH DAILY 90 tablet 1   • Breztri Aerosphere 160-9-4.8 MCG/ACT aerosol inhaler      • ferrous sulfate 324 (65 Fe) MG tablet delayed-release EC tablet Take 1 tablet by mouth Daily With Breakfast.     • fexofenadine (ALLEGRA) 60 MG tablet MUCINEX ALLERGY TABLET     • fluticasone (FLONASE) 50 MCG/ACT nasal spray Administer 2 sprays into the nostril(s) as directed by provider Daily.     • gabapentin (NEURONTIN) 400 MG capsule TAKE 1 CAPSULE BY MOUTH 3 TIMES A DAY 90 capsule 5   • meloxicam (MOBIC) 15 MG tablet Take 1 tablet  by mouth Daily. 90 tablet 1   • montelukast (SINGULAIR) 10 MG tablet TAKE ONE TABLET BY MOUTH ONCE NIGHTLY 90 tablet 1   • multivitamin with minerals (MULTIVITAL PO) Take 1 tablet by mouth Daily.     • omeprazole (priLOSEC) 40 MG capsule Take 1 capsule by mouth Daily. 90 capsule 3   • sertraline (Zoloft) 25 MG tablet Take 1 tablet by mouth Daily. 90 tablet 1   • traZODone (DESYREL) 50 MG tablet Take 1 tablet by mouth Every Night. 30 tablet 0   • vitamin D3 125 MCG (5000 UT) capsule capsule Take 1 capsule by mouth Daily.       No facility-administered medications prior to visit.     No opioid medication identified on active medication list. I have reviewed chart for other potential  high risk medication/s and harmful drug interactions in the elderly.      Aspirin is on active medication list. Aspirin use is indicated based on review of current medical condition/s. Pros and cons of this therapy have been discussed today. Benefits of this medication outweigh potential harm.  Patient has been encouraged to continue taking this medication.  .      Patient Active Problem List   Diagnosis   • Anxiety   • Asthma   • Acute exacerbation of chronic obstructive pulmonary disease   • Dyslipidemia   • Hand tingling   • Iron deficiency   • Pain of foot   • Panic attack   • Sleeping difficulties   • Unspecified sprain of left wrist, initial encounter   • Vitamin D deficiency   • Screening for colon cancer   • Malabsorption due to intolerance, not elsewhere classified     Advance Care Planning Advance Directive is not on file.  ACP discussion was held with the patient during this visit. Patient does not have an advance directive, information provided.            Objective   Vitals:    10/15/24 1054 10/15/24 1133   BP: 148/68 125/68   BP Location: Left arm    Patient Position: Sitting    Cuff Size: Large Adult    Pulse: 88    Temp: 97.8 °F (36.6 °C)    TempSrc: Tympanic    SpO2: 96%    Weight: 132 kg (292 lb)    PainSc:   6   "      Estimated body mass index is 51.73 kg/m² as calculated from the following:    Height as of 24: 160 cm (63\").    Weight as of this encounter: 132 kg (292 lb).            Does the patient have evidence of cognitive impairment? No                                                                                                Health  Risk Assessment    Smoking Status:  Social History     Tobacco Use   Smoking Status Former   • Current packs/day: 0.00   • Average packs/day: 1.5 packs/day for 35.1 years (52.7 ttl pk-yrs)   • Types: Cigarettes   • Start date: 3/10/1984   • Quit date: 2019   • Years since quittin.4   Smokeless Tobacco Never     Alcohol Consumption:  Social History     Substance and Sexual Activity   Alcohol Use No       Fall Risk Screen  STEADI Fall Risk Assessment was completed, and patient is at MODERATE risk for falls. Assessment completed on:10/15/2024    Depression Screening:      10/15/2024    10:57 AM   PHQ-2/PHQ-9 Depression Screening   Little interest or pleasure in doing things Not at all   Feeling down, depressed, or hopeless Not at all     Health Habits and Functional and Cognitive Screening:      10/14/2024     6:08 PM   Functional & Cognitive Status   Do you have difficulty preparing food and eating? No    Do you have difficulty bathing yourself, getting dressed or grooming yourself? No    Do you have difficulty using the toilet? No    Do you have difficulty moving around from place to place? No    Do you have trouble with steps or getting out of a bed or a chair? No    Current Diet Well Balanced Diet    Dental Exam Not up to date    Eye Exam Not up to date    Exercise (times per week) 0 times per week    Current Exercises Include No Regular Exercise    Do you need help using the phone?  No    Are you deaf or do you have serious difficulty hearing?  No    Do you need help to go to places out of walking distance? No    Do you need help shopping? No    Do you need help " preparing meals?  No    Do you need help with housework?  No    Do you need help with laundry? No    Do you need help taking your medications? No    Do you need help managing money? No    Do you ever drive or ride in a car without wearing a seat belt? No    Have you felt unusual stress, anger or loneliness in the last month? No    Who do you live with? Spouse    If you need help, do you have trouble finding someone available to you? No    Have you been bothered in the last four weeks by sexual problems? No    Do you have difficulty concentrating, remembering or making decisions? No        Patient-reported           Age-appropriate Screening Schedule:  Refer to the list below for future screening recommendations based on patient's age, sex and/or medical conditions. Orders for these recommended tests are listed in the plan section. The patient has been provided with a written plan.    Health Maintenance List  Health Maintenance   Topic Date Due   • COLORECTAL CANCER SCREENING  12/06/2023   • LUNG CANCER SCREENING  09/15/2024   • ZOSTER VACCINE (1 of 2) 10/15/2024 (Originally 2/13/2023)   • COVID-19 Vaccine (1 - 2023-24 season) 10/17/2024 (Originally 9/1/2024)   • PAP SMEAR  12/07/2024 (Originally 6/25/2019)   • INFLUENZA VACCINE  03/31/2025 (Originally 8/1/2024)   • Pneumococcal Vaccine 0-64 (1 of 2 - PCV) 10/15/2025 (Originally 2/13/1979)   • TDAP/TD VACCINES (1 - Tdap) 10/15/2025 (Originally 2/13/1992)   • LIPID PANEL  03/14/2025   • BMI FOLLOWUP  07/10/2025   • MAMMOGRAM  09/15/2025   • ANNUAL WELLNESS VISIT  10/15/2025   • HEPATITIS C SCREENING  Completed                                                                                                                                                CMS Preventative Services Quick Reference  Risk Factors Identified During Encounter  Fall Risk-High or Moderate: Discussed Fall Prevention in the home    The above risks/problems have been discussed with the  patient.  Pertinent information has been shared with the patient in the After Visit Summary.  An After Visit Summary and PPPS were made available to the patient.    Follow Up:   Next Medicare Wellness visit to be scheduled in 1 year.         Additional E&M Note during same encounter follows:  Patient has additional, significant, and separately identifiable condition(s)/problem(s) that require work above and beyond the Medicare Wellness Visit     Chief Complaint  Medicare Wellness-subsequent    Subjective   Patient is also following up on chronic medical conditions.  Doing well overall  Lives with her   Has guardianship of her 2 grandsons.  Saw ENT for tinnitus.  Had MRI of brain- showed possible current or old Idiopathic intracranial hypertension.   She is asymptomic     Hyperlipidemia-patient is currently on Lipitor 20 mg daily and aspirin 81 mg daily. Doing well on the medication. She eats a well balanced diet. Staying active.    COPD-patient is currently on Singulair 10 mg nightly, Allegra, Flonase.  She is also on Breztri twice daily inhalation and albuterol nebulizers as needed.  She is on oxygen at 2 L when out and about.  She sees Dr. Engle with pulmonary. She states her breathing is doing well. She does not smoke- quit 5 yrs ago.      Iron deficiency anemia-patient sees hematology for this.  She has had IV iron infusions.  She is stable at this time.  She is on every other day iron supplementation.     Sleep apnea-patient sees Dr. Engle.  She uses a CPAP nightly.     Chronic back pain/neuropathy-patient sees neuro spine for this.  She is currently on gabapentin 300 mg TID (she is supposed to take 600mg at bedtime). She states the medication helps some with the feet. It causes some drowsiness during the day so she doesn't always take it.  She also has meloxicam as needed.    Allergies-patient is currently on Singulair.    Depression-patient is currently on Zoloft 25 mg daily. Doing well. Denies SI or HI.      Insomnia-patient is currently on trazodone 50 mg nightly. Doing well.      Labs- due  Pap smear- UTD- sees GYN  Mammogram- 8/11/23  DEXA- due  Colonoscopy- UTD     Vaccines:  Flu- refused  PNA- refused  Shingles- refused  Tdap- refused  Covid-19-     Dental exam-  Eye exam-    Grace is also being seen today for additional medical problem/s.                Objective   Vital Signs:  /68   Pulse 88   Temp 97.8 °F (36.6 °C) (Tympanic)   Wt 132 kg (292 lb)   SpO2 96%   BMI 51.73 kg/m²   Physical Exam    The following data was reviewed by: SHAKILA Kendrick on 10/15/2024:        Assessment and Plan Additional age appropriate preventative wellness advice topics were discussed during today's preventative wellness exam(some topics already addressed during AWV portion of the note above):   Nutrition: Discussed nutrition plan with patient. Information shared in after visit summary. Goal is for a well balanced diet to enhance overall health.     Healthy Weight: Discussed current and goal BMI with patient. Steps to attain this goal discussed. Information shared in after visit summary.              Medicare annual wellness visit, subsequent    Encounter for screening mammogram for malignant neoplasm of breast    Post-menopausal    Smoking history    Iron deficiency    Abnormal brain MRI    Chronic obstructive pulmonary disease, unspecified COPD type      Depression, unspecified depression type    Insomnia, unspecified type    Neuropathy    Hyperlipidemia, unspecified hyperlipidemia type       Orders Placed This Encounter   Procedures   • Mammo Screening Digital Tomosynthesis Bilateral With CAD     Standing Status:   Future     Order Specific Question:   Reason for Exam:     Answer:   breast cancer screening     Order Specific Question:   Release to patient     Answer:   Routine Release [[id number]]   • DEXA Bone Density Axial     Standing Status:   Future     Order Specific Question:   Reason for Exam:      Answer:   osteoporisis screening     Order Specific Question:   Release to patient     Answer:   Routine Release [[id number]]     Order Specific Question:   Does this patient have a diabetic monitoring/medication delivering device on?     Answer:   No     Order Specific Question:   Is patient taking or have taken long term Glucocorticoid (steroids)?     Answer:   No     Order Specific Question:   Does the patient have rheumatoid arthritis?     Answer:   No     Order Specific Question:   Does the patient have secondary osteoporosis?     Answer:   No   • CT Chest Low Dose Wo     Standing Status:   Future     Standing Expiration Date:   10/15/2025     Order Specific Question:   The patient is age 50-80 (Medicare coverage 50-77)     Answer:   49     Order Specific Question:   The patient is a current smoker?     Answer:   No     Order Specific Question:   Is the patient a former smoker who has quit within the last 15 years? (If the answer to this is no they do not meet criteria for this exam)     Answer:   Yes     Order Specific Question:   The number of years since quitting smoking.     Answer:   5     Order Specific Question:   Does the patient have a smoking history of 20 pack-years or greater? (If the answer to this is no they do not meet criteria for this exam)     Answer:   Yes     Order Specific Question:   Actual pack - year smoking history (number):     Answer:   30     Order Specific Question:   Does the patient have any clinical signs/symptoms of lung cancer?     Answer:   No     Order Specific Question:   The patient was engaged in shared decision-making for this test:     Answer:   Yes     Order Specific Question:   Release to patient     Answer:   Routine Release [[id number]]   • Comprehensive Metabolic Panel     Standing Status:   Future     Standing Expiration Date:   10/14/2025     Order Specific Question:   Release to patient     Answer:   Routine Release [[id number]]   • Lipid Panel     Standing  Status:   Future     Standing Expiration Date:   10/14/2025     Order Specific Question:   Release to patient     Answer:   Routine Release [[id number]]   • Iron and TIBC     Standing Status:   Future     Standing Expiration Date:   10/15/2025     Order Specific Question:   Release to patient     Answer:   Routine Release [[id number]]   • Ambulatory Referral to Neurology     Referral Priority:   Routine     Referral Type:   Consultation     Referral Reason:   Specialty Services Required     Requested Specialty:   Neurology     Number of Visits Requested:   1   • CBC & Differential     Order Specific Question:   Manual Differential     Answer:   No     Order Specific Question:   Release to patient     Answer:   Routine Release [[id number]]             Follow Up   Return in about 6 months (around 4/15/2025).  Patient was given instructions and counseling regarding her condition or for health maintenance advice. Please see specific information pulled into the AVS if appropriate.

## 2024-10-15 ENCOUNTER — OFFICE VISIT (OUTPATIENT)
Dept: FAMILY MEDICINE CLINIC | Facility: CLINIC | Age: 49
End: 2024-10-15
Payer: MEDICARE

## 2024-10-15 VITALS
WEIGHT: 292 LBS | BODY MASS INDEX: 51.73 KG/M2 | HEART RATE: 88 BPM | OXYGEN SATURATION: 96 % | DIASTOLIC BLOOD PRESSURE: 68 MMHG | TEMPERATURE: 97.8 F | SYSTOLIC BLOOD PRESSURE: 125 MMHG

## 2024-10-15 DIAGNOSIS — Z87.891 SMOKING HISTORY: ICD-10-CM

## 2024-10-15 DIAGNOSIS — G47.00 INSOMNIA, UNSPECIFIED TYPE: ICD-10-CM

## 2024-10-15 DIAGNOSIS — E61.1 IRON DEFICIENCY: ICD-10-CM

## 2024-10-15 DIAGNOSIS — R90.89 ABNORMAL BRAIN MRI: ICD-10-CM

## 2024-10-15 DIAGNOSIS — G62.9 NEUROPATHY: ICD-10-CM

## 2024-10-15 DIAGNOSIS — E78.5 HYPERLIPIDEMIA, UNSPECIFIED HYPERLIPIDEMIA TYPE: ICD-10-CM

## 2024-10-15 DIAGNOSIS — F32.A DEPRESSION, UNSPECIFIED DEPRESSION TYPE: ICD-10-CM

## 2024-10-15 DIAGNOSIS — Z78.0 POST-MENOPAUSAL: ICD-10-CM

## 2024-10-15 DIAGNOSIS — J44.9 CHRONIC OBSTRUCTIVE PULMONARY DISEASE, UNSPECIFIED COPD TYPE: ICD-10-CM

## 2024-10-15 DIAGNOSIS — Z00.00 MEDICARE ANNUAL WELLNESS VISIT, SUBSEQUENT: Primary | ICD-10-CM

## 2024-10-15 DIAGNOSIS — Z12.31 ENCOUNTER FOR SCREENING MAMMOGRAM FOR MALIGNANT NEOPLASM OF BREAST: ICD-10-CM

## 2024-10-15 NOTE — PATIENT INSTRUCTIONS
Work on diet and exercise  Referral to neurology  Check labs  Get mammogram and DEXA and CT scan  Advance Care Planning and Advance Directives     You make decisions on a daily basis - decisions about where you want to live, your career, your home, your life. Perhaps one of the most important decisions you face is your choice for future medical care. Take time to talk with your family and your healthcare team and start planning today.  Advance Care Planning is a process that can help you:  Understand possible future healthcare decisions in light of your own experiences  Reflect on those decision in light of your goals and values  Discuss your decisions with those closest to you and the healthcare professionals that care for you  Make a plan by creating a document that reflects your wishes    Surrogate Decision Maker  In the event of a medical emergency, which has left you unable to communicate or to make your own decisions, you would need someone to make decisions for you.  It is important to discuss your preferences for medical treatment with this person while you are in good health.     Qualities of a surrogate decision maker:  Willing to take on this role and responsibility  Knows what you want for future medical care  Willing to follow your wishes even if they don't agree with them  Able to make difficult medical decisions under stressful circumstances    Advance Directives  These are legal documents you can create that will guide your healthcare team and decision maker(s) when needed. These documents can be stored in the electronic medical record.    Living Will - a legal document to guide your care if you have a terminal condition or a serious illness and are unable to communicate. States vary by statute in document names/types, but most forms may include one or more of the following:        -  Directions regarding life-prolonging treatments        -  Directions regarding artificially provided  nutrition/hydration        -  Choosing a healthcare decision maker        -  Direction regarding organ/tissue donation    Durable Power of  for Healthcare - this document names an -in-fact to make medical decisions for you, but it may also allow this person to make personal and financial decisions for you. Please seek the advice of an  if you need this type of document.    **Advance Directives are not required and no one may discriminate against you if you do not sign one.    Medical Orders  Many states allow specific forms/orders signed by your physician to record your wishes for medical treatment in your current state of health. This form, signed in personal communication with your physician, addresses resuscitation and other medical interventions that you may or may not want.      For more information or to schedule a time with a [facility] Advance Care Planning Facilitator contact: [email or web address] or call [phone] and someone will contact you directly.

## 2024-10-17 ENCOUNTER — HOSPITAL ENCOUNTER (OUTPATIENT)
Dept: CT IMAGING | Facility: HOSPITAL | Age: 49
Discharge: HOME | End: 2024-10-17
Admitting: INTERNAL MEDICINE
Payer: MEDICARE

## 2024-10-17 DIAGNOSIS — R06.09 DOE (DYSPNEA ON EXERTION): ICD-10-CM

## 2024-10-17 PROCEDURE — 75571 CT HRT W/O DYE W/CA TEST: CPT

## 2024-10-22 ENCOUNTER — HOSPITAL ENCOUNTER (OUTPATIENT)
Dept: MAMMOGRAPHY | Facility: HOSPITAL | Age: 49
Discharge: HOME | End: 2024-10-22
Admitting: NURSE PRACTITIONER
Payer: MEDICARE

## 2024-10-22 DIAGNOSIS — Z12.31 ENCOUNTER FOR SCREENING MAMMOGRAM FOR MALIGNANT NEOPLASM OF BREAST: ICD-10-CM

## 2024-10-22 PROCEDURE — 77063 BREAST TOMOSYNTHESIS BI: CPT

## 2024-10-22 PROCEDURE — 77067 SCR MAMMO BI INCL CAD: CPT

## 2024-10-23 ENCOUNTER — HOSPITAL ENCOUNTER (OUTPATIENT)
Dept: NUCLEAR MEDICINE | Facility: HOSPITAL | Age: 49
Discharge: HOME | End: 2024-10-23
Payer: MEDICARE

## 2024-10-23 ENCOUNTER — POP HEALTH PHARMACY (OUTPATIENT)
Dept: PHARMACY | Facility: OTHER | Age: 49
End: 2024-10-23
Payer: MEDICARE

## 2024-10-23 ENCOUNTER — HOSPITAL ENCOUNTER (OUTPATIENT)
Dept: CARDIOLOGY | Facility: HOSPITAL | Age: 49
Discharge: HOME | End: 2024-10-23
Payer: MEDICARE

## 2024-10-23 DIAGNOSIS — R06.09 DOE (DYSPNEA ON EXERTION): ICD-10-CM

## 2024-10-23 LAB
AORTIC DIMENSIONLESS INDEX: 0.75 (DI)
BH CV ECHO LEFT VENTRICLE GLOBAL LONGITUDINAL STRAIN: -13.3 %
BH CV ECHO MEAS - ACS: 2 CM
BH CV ECHO MEAS - AO MAX PG: 8.5 MMHG
BH CV ECHO MEAS - AO MEAN PG: 5 MMHG
BH CV ECHO MEAS - AO V2 MAX: 146 CM/SEC
BH CV ECHO MEAS - AO V2 VTI: 37.5 CM
BH CV ECHO MEAS - AVA(I,D): 3.5 CM2
BH CV ECHO MEAS - EDV(CUBED): 125 ML
BH CV ECHO MEAS - EDV(MOD-SP4): 97 ML
BH CV ECHO MEAS - EF(MOD-SP4): 60.2 %
BH CV ECHO MEAS - ESV(CUBED): 39.3 ML
BH CV ECHO MEAS - ESV(MOD-SP4): 38.6 ML
BH CV ECHO MEAS - FS: 32 %
BH CV ECHO MEAS - IVS/LVPW: 1.11 CM
BH CV ECHO MEAS - IVSD: 1 CM
BH CV ECHO MEAS - LA DIMENSION: 3.7 CM
BH CV ECHO MEAS - LAT PEAK E' VEL: 6.6 CM/SEC
BH CV ECHO MEAS - LV MASS(C)D: 169.9 GRAMS
BH CV ECHO MEAS - LV MAX PG: 4.8 MMHG
BH CV ECHO MEAS - LV MEAN PG: 2 MMHG
BH CV ECHO MEAS - LV V1 MAX: 110 CM/SEC
BH CV ECHO MEAS - LV V1 VTI: 26.5 CM
BH CV ECHO MEAS - LVIDD: 5 CM
BH CV ECHO MEAS - LVIDS: 3.4 CM
BH CV ECHO MEAS - LVOT AREA: 4.9 CM2
BH CV ECHO MEAS - LVOT DIAM: 2.5 CM
BH CV ECHO MEAS - LVPWD: 0.9 CM
BH CV ECHO MEAS - MED PEAK E' VEL: 9.7 CM/SEC
BH CV ECHO MEAS - MR MAX PG: 37.2 MMHG
BH CV ECHO MEAS - MR MAX VEL: 305 CM/SEC
BH CV ECHO MEAS - MV A MAX VEL: 94.9 CM/SEC
BH CV ECHO MEAS - MV DEC SLOPE: 698 CM/SEC2
BH CV ECHO MEAS - MV DEC TIME: 0.21 SEC
BH CV ECHO MEAS - MV E MAX VEL: 129 CM/SEC
BH CV ECHO MEAS - MV E/A: 1.36
BH CV ECHO MEAS - MV MAX PG: 7.5 MMHG
BH CV ECHO MEAS - MV MEAN PG: 3 MMHG
BH CV ECHO MEAS - MV P1/2T: 64.2 MSEC
BH CV ECHO MEAS - MV V2 VTI: 30.7 CM
BH CV ECHO MEAS - MVA(P1/2T): 3.4 CM2
BH CV ECHO MEAS - MVA(VTI): 4.2 CM2
BH CV ECHO MEAS - PA V2 MAX: 87 CM/SEC
BH CV ECHO MEAS - QP/QS: 0.36
BH CV ECHO MEAS - RV MAX PG: 1.98 MMHG
BH CV ECHO MEAS - RV V1 MAX: 70.3 CM/SEC
BH CV ECHO MEAS - RV V1 VTI: 18.4 CM
BH CV ECHO MEAS - RVDD: 3 CM
BH CV ECHO MEAS - RVOT DIAM: 1.8 CM
BH CV ECHO MEAS - SV(LVOT): 130.1 ML
BH CV ECHO MEAS - SV(MOD-SP4): 58.4 ML
BH CV ECHO MEAS - SV(RVOT): 46.8 ML
BH CV ECHO MEAS - TAPSE (>1.6): 2.6 CM
BH CV ECHO MEASUREMENTS AVERAGE E/E' RATIO: 15.83
BH CV XLRA - TDI S': 12.4 CM/SEC
SINUS: 3.2 CM

## 2024-10-23 PROCEDURE — A9502 TC99M TETROFOSMIN: HCPCS | Performed by: INTERNAL MEDICINE

## 2024-10-23 PROCEDURE — 93356 MYOCRD STRAIN IMG SPCKL TRCK: CPT

## 2024-10-23 PROCEDURE — 93306 TTE W/DOPPLER COMPLETE: CPT

## 2024-10-23 PROCEDURE — A9500 TC99M SESTAMIBI: HCPCS | Performed by: INTERNAL MEDICINE

## 2024-10-23 PROCEDURE — 25010000002 REGADENOSON 0.4 MG/5ML SOLUTION: Performed by: INTERNAL MEDICINE

## 2024-10-23 PROCEDURE — 78452 HT MUSCLE IMAGE SPECT MULT: CPT

## 2024-10-23 PROCEDURE — 0 TECHNETIUM SESTAMIBI: Performed by: INTERNAL MEDICINE

## 2024-10-23 PROCEDURE — 93017 CV STRESS TEST TRACING ONLY: CPT

## 2024-10-23 PROCEDURE — 0 TECHNETIUM TETROFOSMIN KIT: Performed by: INTERNAL MEDICINE

## 2024-10-23 RX ORDER — REGADENOSON 0.08 MG/ML
0.4 INJECTION, SOLUTION INTRAVENOUS
Status: COMPLETED | OUTPATIENT
Start: 2024-10-23 | End: 2024-10-23

## 2024-10-23 RX ADMIN — TECHNETIUM TC 99M SESTAMIBI 1 DOSE: 1 INJECTION INTRAVENOUS at 08:45

## 2024-10-23 RX ADMIN — REGADENOSON 0.4 MG: 0.08 INJECTION, SOLUTION INTRAVENOUS at 09:44

## 2024-10-23 RX ADMIN — TETROFOSMIN 1 DOSE: 1.38 INJECTION, POWDER, LYOPHILIZED, FOR SOLUTION INTRAVENOUS at 09:44

## 2024-10-23 NOTE — PROGRESS NOTES
[facility]reach      Grace Clement was called today to discuss medication adherence with ATORVASTATIN [employer]CIUM ([facility] [employer] REDUCTASE INHIBITORS) , as she was identified as having care opportunities.    Program Details    [facility]  Status: Enrolled  Effective Dates: 10/21/2024 - present  Responsible Staff: Lisa Snow LPN          Opportunities Identified    Adherence- Cholesterol       Adherence and Medication Management    Adherence Questions   Patient Reported X Missed Doses in the Last 7 Days : 0  Reasons for Non-Adherence : No problems identified (States she takes q day with no issues.)  Interested in a 90 day supply? : No  Does this require clinical escalation to a pharmacist?: Y (She does not have refills on this medication.  She had a recent apt. on 10/15. She is not interested in a 90 day supply. She would like to keep it at 30 day supply and requests refills.)         General Medication Management    Type of medication management: targeted medication review  Review Completed on: 10/23/24  Referred by: pharmacist  Provider: licensed practical nurse  Visit type: initial  Method of contact: by telephone           Medication Therapy Problems     Medication Therapy Recommendations  No medication therapy recommendations to display      Summary    Medication Management Summary    Topics discussed: adherence and missed doses discussed, reminder to refill or  medication discussed  Time spent: 61 - 75 min         Lisa Snow LPN, 10/23/24, 1:17 PM EDT.

## 2024-10-24 ENCOUNTER — EXTERNAL PBMM DATA (OUTPATIENT)
Dept: PHARMACY | Facility: OTHER | Age: 49
End: 2024-10-24
Payer: MEDICARE

## 2024-10-24 LAB
BH CV NUCLEAR PRIOR STUDY: 3
BH CV REST NUCLEAR ISOTOPE DOSE: 11 MCI
BH CV STRESS BP STAGE 1: NORMAL
BH CV STRESS BP STAGE 2: NORMAL
BH CV STRESS BP STAGE 3: NORMAL
BH CV STRESS COMMENTS STAGE 1: NORMAL
BH CV STRESS COMMENTS STAGE 2: NORMAL
BH CV STRESS DOSE REGADENOSON STAGE 1: 0.4
BH CV STRESS DURATION MIN STAGE 1: 0
BH CV STRESS DURATION MIN STAGE 2: 4
BH CV STRESS DURATION SEC STAGE 1: 10
BH CV STRESS DURATION SEC STAGE 2: 0
BH CV STRESS HR STAGE 1: 86
BH CV STRESS HR STAGE 2: 93
BH CV STRESS HR STAGE 3: 86
BH CV STRESS NUCLEAR ISOTOPE DOSE: 33 MCI
BH CV STRESS PROTOCOL 1: NORMAL
BH CV STRESS RECOVERY BP: NORMAL MMHG
BH CV STRESS RECOVERY HR: 85 BPM
BH CV STRESS STAGE 1: 1
BH CV STRESS STAGE 2: 2
BH CV STRESS STAGE 3: 3
LV EF NUC BP: 71 %
MAXIMAL PREDICTED HEART RATE: 169 BPM
PERCENT MAX PREDICTED HR: 56.8 %
STRESS BASELINE BP: NORMAL MMHG
STRESS BASELINE HR: 96 BPM
STRESS PERCENT HR: 67 %
STRESS POST PEAK BP: NORMAL MMHG
STRESS POST PEAK HR: 96 BPM
STRESS TARGET HR: 144 BPM

## 2024-10-25 ENCOUNTER — LAB (OUTPATIENT)
Dept: FAMILY MEDICINE CLINIC | Facility: CLINIC | Age: 49
End: 2024-10-25
Payer: MEDICARE

## 2024-10-25 DIAGNOSIS — E61.1 IRON DEFICIENCY: ICD-10-CM

## 2024-10-25 DIAGNOSIS — Z00.00 MEDICARE ANNUAL WELLNESS VISIT, SUBSEQUENT: ICD-10-CM

## 2024-10-25 LAB
ALBUMIN SERPL-MCNC: 4.1 G/DL (ref 3.5–5.2)
ALBUMIN/GLOB SERPL: 1.6 G/DL
ALP SERPL-CCNC: 62 U/L (ref 39–117)
ALT SERPL W P-5'-P-CCNC: 16 U/L (ref 1–33)
ANION GAP SERPL CALCULATED.3IONS-SCNC: 10.3 MMOL/L (ref 5–15)
AST SERPL-CCNC: 20 U/L (ref 1–32)
BASOPHILS # BLD AUTO: 0.01 10*3/MM3 (ref 0–0.2)
BASOPHILS NFR BLD AUTO: 0.2 % (ref 0–1.5)
BILIRUB SERPL-MCNC: 0.8 MG/DL (ref 0–1.2)
BUN SERPL-MCNC: 11 MG/DL (ref 6–20)
BUN/CREAT SERPL: 12.6 (ref 7–25)
CALCIUM SPEC-SCNC: 9.2 MG/DL (ref 8.6–10.5)
CHLORIDE SERPL-SCNC: 106 MMOL/L (ref 98–107)
CHOLEST SERPL-MCNC: 123 MG/DL (ref 0–200)
CO2 SERPL-SCNC: 25.7 MMOL/L (ref 22–29)
CREAT SERPL-MCNC: 0.87 MG/DL (ref 0.57–1)
DEPRECATED RDW RBC AUTO: 47.3 FL (ref 37–54)
EGFRCR SERPLBLD CKD-EPI 2021: 80.8 ML/MIN/1.73
EOSINOPHIL # BLD AUTO: 0.15 10*3/MM3 (ref 0–0.4)
EOSINOPHIL NFR BLD AUTO: 3.5 % (ref 0.3–6.2)
ERYTHROCYTE [DISTWIDTH] IN BLOOD BY AUTOMATED COUNT: 14.6 % (ref 12.3–15.4)
GLOBULIN UR ELPH-MCNC: 2.6 GM/DL
GLUCOSE SERPL-MCNC: 98 MG/DL (ref 65–99)
HCT VFR BLD AUTO: 31 % (ref 34–46.6)
HDLC SERPL-MCNC: 30 MG/DL (ref 40–60)
HGB BLD-MCNC: 10.5 G/DL (ref 12–15.9)
IMM GRANULOCYTES # BLD AUTO: 0.02 10*3/MM3 (ref 0–0.05)
IMM GRANULOCYTES NFR BLD AUTO: 0.5 % (ref 0–0.5)
IRON 24H UR-MRATE: 47 MCG/DL (ref 37–145)
IRON SATN MFR SERPL: 15 % (ref 20–50)
LDLC SERPL CALC-MCNC: 68 MG/DL (ref 0–100)
LDLC/HDLC SERPL: 2.17 {RATIO}
LYMPHOCYTES # BLD AUTO: 0.96 10*3/MM3 (ref 0.7–3.1)
LYMPHOCYTES NFR BLD AUTO: 22.6 % (ref 19.6–45.3)
MCH RBC QN AUTO: 30 PG (ref 26.6–33)
MCHC RBC AUTO-ENTMCNC: 33.9 G/DL (ref 31.5–35.7)
MCV RBC AUTO: 88.6 FL (ref 79–97)
MONOCYTES # BLD AUTO: 0.27 10*3/MM3 (ref 0.1–0.9)
MONOCYTES NFR BLD AUTO: 6.4 % (ref 5–12)
NEUTROPHILS NFR BLD AUTO: 2.84 10*3/MM3 (ref 1.7–7)
NEUTROPHILS NFR BLD AUTO: 66.8 % (ref 42.7–76)
NRBC BLD AUTO-RTO: 0 /100 WBC (ref 0–0.2)
PLATELET # BLD AUTO: 217 10*3/MM3 (ref 140–450)
PMV BLD AUTO: 10.1 FL (ref 6–12)
POTASSIUM SERPL-SCNC: 3.6 MMOL/L (ref 3.5–5.2)
PROT SERPL-MCNC: 6.7 G/DL (ref 6–8.5)
RBC # BLD AUTO: 3.5 10*6/MM3 (ref 3.77–5.28)
SODIUM SERPL-SCNC: 142 MMOL/L (ref 136–145)
TIBC SERPL-MCNC: 316 MCG/DL (ref 298–536)
TRANSFERRIN SERPL-MCNC: 212 MG/DL (ref 200–360)
TRIGL SERPL-MCNC: 139 MG/DL (ref 0–150)
VLDLC SERPL-MCNC: 25 MG/DL (ref 5–40)
WBC NRBC COR # BLD AUTO: 4.25 10*3/MM3 (ref 3.4–10.8)

## 2024-10-25 PROCEDURE — 80053 COMPREHEN METABOLIC PANEL: CPT | Performed by: NURSE PRACTITIONER

## 2024-10-25 PROCEDURE — 84466 ASSAY OF TRANSFERRIN: CPT | Performed by: NURSE PRACTITIONER

## 2024-10-25 PROCEDURE — 85025 COMPLETE CBC W/AUTO DIFF WBC: CPT | Performed by: NURSE PRACTITIONER

## 2024-10-25 PROCEDURE — 80061 LIPID PANEL: CPT | Performed by: NURSE PRACTITIONER

## 2024-10-25 PROCEDURE — 83540 ASSAY OF IRON: CPT | Performed by: NURSE PRACTITIONER

## 2024-10-28 DIAGNOSIS — F32.A DEPRESSION, UNSPECIFIED DEPRESSION TYPE: ICD-10-CM

## 2024-10-28 RX ORDER — SERTRALINE HYDROCHLORIDE 25 MG/1
25 TABLET, FILM COATED ORAL DAILY
Qty: 90 TABLET | Refills: 1 | Status: SHIPPED | OUTPATIENT
Start: 2024-10-28

## 2024-11-06 ENCOUNTER — POP HEALTH PHARMACY (OUTPATIENT)
Dept: PHARMACY | Facility: OTHER | Age: 49
End: 2024-11-06
Payer: MEDICARE

## 2024-11-11 ENCOUNTER — OFFICE VISIT (OUTPATIENT)
Dept: FAMILY MEDICINE CLINIC | Facility: CLINIC | Age: 49
End: 2024-11-11
Payer: MEDICARE

## 2024-11-11 VITALS
HEART RATE: 94 BPM | SYSTOLIC BLOOD PRESSURE: 132 MMHG | WEIGHT: 293 LBS | DIASTOLIC BLOOD PRESSURE: 79 MMHG | OXYGEN SATURATION: 98 % | BODY MASS INDEX: 52.08 KG/M2 | TEMPERATURE: 97.7 F

## 2024-11-11 DIAGNOSIS — J44.0 COPD WITH LOWER RESPIRATORY INFECTION: Primary | ICD-10-CM

## 2024-11-11 PROCEDURE — 1159F MED LIST DOCD IN RCRD: CPT | Performed by: NURSE PRACTITIONER

## 2024-11-11 PROCEDURE — 1125F AMNT PAIN NOTED PAIN PRSNT: CPT | Performed by: NURSE PRACTITIONER

## 2024-11-11 PROCEDURE — 1160F RVW MEDS BY RX/DR IN RCRD: CPT | Performed by: NURSE PRACTITIONER

## 2024-11-11 PROCEDURE — 99214 OFFICE O/P EST MOD 30 MIN: CPT | Performed by: NURSE PRACTITIONER

## 2024-11-11 PROCEDURE — G2211 COMPLEX E/M VISIT ADD ON: HCPCS | Performed by: NURSE PRACTITIONER

## 2024-11-11 RX ORDER — AZITHROMYCIN 250 MG/1
TABLET, FILM COATED ORAL
Qty: 6 TABLET | Refills: 0 | Status: SHIPPED | OUTPATIENT
Start: 2024-11-11

## 2024-11-11 RX ORDER — PREDNISONE 20 MG/1
40 TABLET ORAL DAILY
Qty: 10 TABLET | Refills: 0 | Status: SHIPPED | OUTPATIENT
Start: 2024-11-11 | End: 2024-11-16

## 2024-11-11 NOTE — PROGRESS NOTES
Subjective     Grace Clement is a 49 y.o. female.     History of Present Illness  Pt is here today with c/o cough, congestion, and diarrhea.  She has a history of asthma and COPD  She started feeling bad over a week ago  She has been using her breathing treatments  She has tried robutessin, dayquil and nightquil, and mucinex  She is having trouble coughing up the phlegm  She has some sinus congestion  Denies fevers  She has had some diarrhea for 4 days  She wears 2L NC at home.        The following portions of the patient's history were reviewed and updated as appropriate: allergies, current medications, past family history, past medical history, past social history, past surgical history, and problem list.    Review of Systems   Constitutional:  Positive for fatigue. Negative for chills and fever.   HENT:  Positive for congestion and sinus pressure.    Respiratory:  Positive for cough, chest tightness and shortness of breath. Negative for wheezing.    Cardiovascular:  Negative for chest pain and palpitations.   Neurological:  Negative for dizziness and headache.       Objective     /79 (BP Location: Left arm, Patient Position: Sitting, Cuff Size: Large Adult)   Pulse 94   Temp 97.7 °F (36.5 °C) (Tympanic)   Wt 133 kg (294 lb)   SpO2 98%   BMI 52.08 kg/m²     Current Outpatient Medications on File Prior to Visit   Medication Sig Dispense Refill    albuterol (ACCUNEB) 0.63 MG/3ML nebulizer solution Take 3 mL by nebulization Every 6 (Six) Hours As Needed for Wheezing. 360 mL 3    albuterol sulfate  (90 Base) MCG/ACT inhaler Inhale 2 puffs As Needed.      aspirin 81 MG tablet Take 1 tablet by mouth Daily.      atorvastatin (LIPITOR) 20 MG tablet TAKE 1 TABLET BY MOUTH DAILY 90 tablet 1    Breztri Aerosphere 160-9-4.8 MCG/ACT aerosol inhaler       ferrous sulfate 324 (65 Fe) MG tablet delayed-release EC tablet Take 1 tablet by mouth Daily With Breakfast.      fexofenadine (ALLEGRA) 60 MG tablet  MUCINEX ALLERGY TABLET      fluticasone (FLONASE) 50 MCG/ACT nasal spray Administer 2 sprays into the nostril(s) as directed by provider Daily.      gabapentin (NEURONTIN) 400 MG capsule TAKE 1 CAPSULE BY MOUTH 3 TIMES A DAY 90 capsule 5    meloxicam (MOBIC) 15 MG tablet Take 1 tablet by mouth Daily. 90 tablet 1    montelukast (SINGULAIR) 10 MG tablet TAKE ONE TABLET BY MOUTH ONCE NIGHTLY 90 tablet 1    multivitamin with minerals (MULTIVITAL PO) Take 1 tablet by mouth Daily.      omeprazole (priLOSEC) 40 MG capsule Take 1 capsule by mouth Daily. 90 capsule 3    sertraline (ZOLOFT) 25 MG tablet TAKE 1 TABLET BY MOUTH DAILY 90 tablet 1    traZODone (DESYREL) 50 MG tablet Take 1 tablet by mouth Every Night. 30 tablet 0    vitamin D3 125 MCG (5000 UT) capsule capsule Take 1 capsule by mouth Daily.       No current facility-administered medications on file prior to visit.                 Physical Exam  Constitutional:       General: She is not in acute distress.     Appearance: Normal appearance. She is not ill-appearing.   HENT:      Head: Normocephalic and atraumatic.   Eyes:      Extraocular Movements: Extraocular movements intact.   Pulmonary:      Effort: Pulmonary effort is normal. No respiratory distress.   Neurological:      General: No focal deficit present.      Mental Status: She is alert and oriented to person, place, and time.   Psychiatric:         Mood and Affect: Mood normal.         Behavior: Behavior normal.         Thought Content: Thought content normal.         Judgment: Judgment normal.           Assessment & Plan     Diagnoses and all orders for this visit:    1. COPD with lower respiratory infection (Primary)  Comments:  COPD flare  will treat with steroid and antibiotic  cont breathing treatments  call if no imp  vitals stable  Orders:  -     azithromycin (Zithromax Z-Diego) 250 MG tablet; Take 2 tablets by mouth on day 1, then 1 tablet daily on days 2-5  Dispense: 6 tablet; Refill: 0  -      predniSONE (DELTASONE) 20 MG tablet; Take 2 tablets by mouth Daily for 5 days.  Dispense: 10 tablet; Refill: 0

## 2024-11-13 ENCOUNTER — HOSPITAL ENCOUNTER (OUTPATIENT)
Dept: BONE DENSITY | Facility: HOSPITAL | Age: 49
Discharge: HOME | End: 2024-11-13
Payer: MEDICARE

## 2024-11-13 ENCOUNTER — HOSPITAL ENCOUNTER (OUTPATIENT)
Dept: CT IMAGING | Facility: HOSPITAL | Age: 49
Discharge: HOME | End: 2024-11-13
Payer: MEDICARE

## 2024-11-13 DIAGNOSIS — Z87.891 SMOKING HISTORY: ICD-10-CM

## 2024-11-13 DIAGNOSIS — Z78.0 POST-MENOPAUSAL: ICD-10-CM

## 2024-11-13 PROCEDURE — 77080 DXA BONE DENSITY AXIAL: CPT

## 2024-11-13 PROCEDURE — 71271 CT THORAX LUNG CANCER SCR C-: CPT

## 2024-11-20 ENCOUNTER — EXTERNAL PBMM DATA (OUTPATIENT)
Dept: PHARMACY | Facility: OTHER | Age: 49
End: 2024-11-20
Payer: MEDICARE

## 2024-11-22 ENCOUNTER — OFFICE VISIT (OUTPATIENT)
Age: 49
End: 2024-11-22
Payer: MEDICARE

## 2024-11-22 VITALS — HEIGHT: 63 IN | HEART RATE: 84 BPM | WEIGHT: 293 LBS | BODY MASS INDEX: 51.91 KG/M2 | OXYGEN SATURATION: 96 %

## 2024-11-22 DIAGNOSIS — L60.8 PINCER NAIL DEFORMITY: ICD-10-CM

## 2024-11-22 DIAGNOSIS — L60.3 ONYCHODYSTROPHY: ICD-10-CM

## 2024-11-22 DIAGNOSIS — M79.675 PAIN IN TOES OF BOTH FEET: Primary | ICD-10-CM

## 2024-11-22 DIAGNOSIS — M79.674 PAIN IN TOES OF BOTH FEET: Primary | ICD-10-CM

## 2024-11-22 DIAGNOSIS — G62.9 NEUROPATHY: ICD-10-CM

## 2024-11-22 DIAGNOSIS — R26.81 UNSTEADINESS ON FEET: ICD-10-CM

## 2024-11-22 NOTE — PROGRESS NOTES
11/22/2024  Foot and Ankle Surgery - Established Patient/Follow-up  Provider: SHAKILA Avitia   Location: [facility]    Subjective:  Grace Clement is a 49 y.o. female.     Chief Complaint   Patient presents with    Left Foot - Follow-up, Initial Evaluation, Nail Problem, Peripheral Neuropathy     Non dm foot care     Right Foot - Follow-up, Initial Evaluation, Nail Problem, Peripheral Neuropathy     Non dm foot care    Follow-up     Jaye Ogden APRN  PCP - 11/11/24         History of Present Illness  The patient is a 1-month-old female who is here today for routine foot check.    She reports tenderness in her left big toenail, a condition that has been persistent. She has a history of pincer toenail deformity. She also mentions that her feet are starting to feel cold and painful. She has been diagnosed with neuropathy, which has been ongoing for 6 years. She is not diabetic. 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    FAMILY HISTORY         Allergies   Allergen Reactions    Codeine Hives, Itching and Rash       Current Outpatient Medications on File Prior to Visit   Medication Sig Dispense Refill    albuterol (ACCUNEB) 0.63 MG/3ML nebulizer solution Take 3 mL by nebulization Every 6 (Six) Hours As Needed for Wheezing. 360 mL 3    aspirin 81 MG tablet Take 1 tablet by mouth Daily.      atorvastatin (LIPITOR) 20 MG tablet TAKE 1 TABLET BY MOUTH DAILY 90 tablet 1    azithromycin (Zithromax Z-Diego) 250 MG tablet Take 2 tablets by mouth on day 1, then 1 tablet daily on days 2-5 6 tablet 0    Breztri Aerosphere 160-9-4.8 MCG/ACT aerosol inhaler       ferrous sulfate 324 (65 Fe) MG tablet delayed-release EC tablet Take 1 tablet by mouth Daily With Breakfast.      fexofenadine (ALLEGRA) 60 MG tablet MUCINEX ALLERGY TABLET      fluticasone (FLONASE) 50 MCG/ACT nasal spray Administer 2 sprays into the nostril(s) as directed by  "provider Daily.      gabapentin (NEURONTIN) 400 MG capsule TAKE 1 CAPSULE BY MOUTH 3 TIMES A DAY 90 capsule 5    meloxicam (MOBIC) 15 MG tablet Take 1 tablet by mouth Daily. 90 tablet 1    montelukast (SINGULAIR) 10 MG tablet TAKE ONE TABLET BY MOUTH ONCE NIGHTLY 90 tablet 1    multivitamin with minerals (MULTIVITAL PO) Take 1 tablet by mouth Daily.      omeprazole (priLOSEC) 40 MG capsule Take 1 capsule by mouth Daily. 90 capsule 3    sertraline (ZOLOFT) 25 MG tablet TAKE 1 TABLET BY MOUTH DAILY 90 tablet 1    traZODone (DESYREL) 50 MG tablet Take 1 tablet by mouth Every Night. 30 tablet 0    vitamin D3 125 MCG (5000 UT) capsule capsule Take 1 capsule by mouth Daily.      albuterol sulfate  (90 Base) MCG/ACT inhaler Inhale 2 puffs As Needed.       No current facility-administered medications on file prior to visit.       Objective   Pulse 84   Ht 160 cm (63\")   Wt 133 kg (294 lb)   SpO2 96%   BMI 52.08 kg/m²     Foot/Ankle Exam  Physical Exam  Musculoskeletal examination reveals stable bilateral feet. No open wounds or signs of active acute infection or inflammation.   GENERAL  Appearance:  appears stated age and obese  Orientation:  AAOx3  Affect:  appropriate  Gait:  unimpaired  Assistance:  independent  Right shoe gear: sandal  Left shoe gear: sandal     VASCULAR      Right Foot Vascularity   Dorsalis pedis:  2+  Skin temperature:  warm  Edema grading:  Trace  CFT:  < 3 seconds  Pedal hair growth:  Present  Varicosities:  mild varicosities      Left Foot Vascularity   Dorsalis pedis:  2+  Skin temperature:  warm  Edema grading:  Trace  CFT:  < 3 seconds  Pedal hair growth:  Present  Varicosities:  mild varicosities     NEUROLOGIC      Right Foot Neurologic   Light touch sensation: diminished  Protective Sensation using [place] Monofilament:   Sites intact: 5  Sites tested: 10      Left Foot Neurologic   Light touch sensation: diminished  Protective Sensation using [place] " Monofilament:   Sites intact: 5  Sites tested: 10     MUSCULOSKELETAL      Right Foot Musculoskeletal   Ecchymosis:  none  Tenderness:  toenail problem        Left Foot Musculoskeletal   Ecchymosis:  none  Tenderness:  toenail problem     DERMATOLOGIC       Right Foot Dermatologic   Skin  Positive for dryness and skin changes.   Nails  1.  Positive for abnormal thickness and dystrophic nail.  2.  Positive for elongated, abnormal thickness and dystrophic nail.  3.  Positive for abnormal thickness and dystrophic nail.  4.  Positive for abnormal thickness and dystrophic nail.  5.  Positive for abnormal thickness and dystrophic nail.      Left Foot Dermatologic   Skin  Positive for dryness and skin changes.   Nails  1.  Positive for abnormal thickness and dystrophic nail.  2.  Positive for elongated, abnormal thickness and dystrophic nail.  3.  Positive for abnormal thickness and dystrophic nail.  4.  Positive for abnormally thick and dystrophic nail.  5.  Positive for abnormally thick and dystrophic nail.    Results       Assessment & Plan   Diagnoses and all orders for this visit:    1. Pain in toes of both feet (Primary)    2. Onychodystrophy    3. Neuropathy    4. Unsteadiness on feet    5. Pincer nail deformity      Assessment & Plan  1. Pincer toenail deformity.  The patient has a pincer toenail deformity on the left big toe, which is causing tenderness. Conservative treatment includes keeping the nails trimmed and wearing shoes with plenty of toe room that are soft on the top to avoid pressure on the nail. The option of permanently removing the toenail was discussed, which involves numbing the toe, removing the nail, and applying an acid to the nail bed to prevent regrowth. This procedure can cause moderate pain for about a week during healing. The patient is not ready to proceed with this option at this time.    2. Neuropathy.  The patient has neuropathy, which may be related to lower back problems. She is  advised to check her feet daily and avoid going barefoot to prevent injuries. She reports wearing multiple pairs of socks to keep her feet warm and reduce pain.    3. Routine foot check.  Nails were debrided today x10. Bilateral feet are stable with no open wounds or signs of active acute infection or inflammation.    Nail debridement: Both feet x10    Consent and time out was performed before proceeding with the procedure. Nails were debrided with a nail nipper without complication.  No anesthesia was required.  Indications for procedure were thickened, dystrophic, and fungal appearing nails which are difficult to trim.  Proper self-care and technique was discussed with patient.  Patient was stable after procedure.     Follow-up  Patient is scheduled for a follow-up visit in 2 months.       No orders of the defined types were placed in this encounter.         Patient or patient representative verbalized consent for the use of Ambient Listening during the visit with  SHAKILA Rubio for chart documentation. 11/22/2024  12:53 EST

## 2024-12-05 ENCOUNTER — POP HEALTH PHARMACY (OUTPATIENT)
Dept: PHARMACY | Facility: OTHER | Age: 49
End: 2024-12-05
Payer: MEDICARE

## 2024-12-12 DIAGNOSIS — M79.641 PAIN IN BOTH HANDS: ICD-10-CM

## 2024-12-12 DIAGNOSIS — M79.642 PAIN IN BOTH HANDS: ICD-10-CM

## 2024-12-13 RX ORDER — MELOXICAM 15 MG/1
15 TABLET ORAL DAILY
Qty: 90 TABLET | Refills: 1 | Status: SHIPPED | OUTPATIENT
Start: 2024-12-13

## 2025-01-02 ENCOUNTER — DOCUMENTATION (OUTPATIENT)
Dept: PHYSICAL THERAPY | Facility: CLINIC | Age: 50
End: 2025-01-02
Payer: MEDICARE

## 2025-01-02 NOTE — PROGRESS NOTES
Physical Therapy Discharge Summary  [address] [address]    Patient: Grace Clement   : 1975  Diagnosis/ICD-10 Code:  Right leg pain [M79.604]  Referring practitioner: SHAKILA Cavazos  Date of Initial Visit: Type: THERAPY  Noted: 2024  Last Visit Date: 2024  Patient seen for 2 sessions    Discharge Status of Patient: pt was non-compliant with multiple cancellations and was going to call back to schedule when personal issues calmed down, but never returned. POC has .      Goals: Goal status is undetermined as pt never returned to PT     Discharge Plan: No specific D/C instructions were given as pt never returned to PT after 24.      Comments: Pt was given HEP during sessions.      Date of Discharge: 25            Louisa Hampton, PT  Physical Therapist  [place] License: [id number]

## 2025-02-17 RX ORDER — ATORVASTATIN CALCIUM 20 MG/1
20 TABLET, FILM COATED ORAL DAILY
Qty: 90 TABLET | Refills: 1 | Status: SHIPPED | OUTPATIENT
Start: 2025-02-17

## 2025-02-26 DIAGNOSIS — G62.9 NEUROPATHY: ICD-10-CM

## 2025-02-26 RX ORDER — GABAPENTIN 400 MG/1
400 CAPSULE ORAL 3 TIMES DAILY
Qty: 90 CAPSULE | Refills: 5 | Status: SHIPPED | OUTPATIENT
Start: 2025-02-26

## 2025-02-26 NOTE — PROGRESS NOTES
"Subjective: Possible Pseudotumor Cerebri     Patient ID: Grace Clement is a 50 y.o. female.    CHIEF COMPLAINT: Headaches                    Prior history :Neuropathy     History of Present Illness Ms. Clement is a 50-year-old  female who has been followed by this office for neuropathy.  She had started having some tinnitus and was sent to ENT.  She had an MRI of the brain which showed \"possible current or old Idiopathic intracranial hypertension.  \"  Her BMI is 52.61, she is having frequent headaches.  She denies any visual changes.  She was in agreement with workup for possible idiopathic intracranial hypertension.    For neuropathy the patient states she is well-controlled with the gabapentin 400 and Voltaren cream however it is very expensive.  She asked if there was another cream that we could order and I notified her of the creams that are available through [facility] alternatives for neuropathy.  She would like to try 1 of those creams.      Complaint: Headaches   Onset: 5 weeks  Aura: na  Location: front sometimes on sides  Quality: pressure  Severity: 7-8  Duration:  1 hour  Frequency: 2 - 3 a day  Timing: Unrelated  Context: Unrelated  Modifying factors: close eyes  Associated Signs and symptoms: Light sensitive   Current meds: None        Prior testing        The following portions of the patient's history were reviewed and updated as appropriate: allergies, current medications, past family history, past medical history, past social history, past surgical history and problem list.      Family History   Problem Relation Age of Onset    Alcohol abuse Mother     COPD Mother     Depression Mother     Vision loss Mother     Early death Mother     Alcohol abuse Father     Early death Father     Alcohol abuse Sister     Depression Sister     Miscarriages / Stillbirths Sister     Learning disabilities Sister     Mental illness Sister     Miscarriages / Stillbirths Sister     Alcohol abuse Brother     COPD " Brother     Hyperlipidemia Brother     Drug abuse Brother     Early death Brother     Arthritis Brother     Drug abuse Brother     Cancer Maternal Uncle     Early death Maternal Uncle     Heart disease Maternal Grandmother     Early death Maternal Grandmother     Anxiety disorder Daughter     Asthma Daughter     Depression Daughter     Drug abuse Daughter     Learning disabilities Daughter     Mental illness Daughter        Past Medical History:   Diagnosis Date    Allergic 1994    Anemia     Arthritis     Asthma     COPD (chronic obstructive pulmonary disease)     Difficulty walking 2020    Hyperlipidemia     Obesity     Vitamin D deficiency        Social History     Socioeconomic History    Marital status:    Tobacco Use    Smoking status: Former     Current packs/day: 0.00     Average packs/day: 1.5 packs/day for 35.1 years (52.7 ttl pk-yrs)     Types: Cigarettes     Start date: 3/10/1984     Quit date: 2019     Years since quittin.8    Smokeless tobacco: Never   Vaping Use    Vaping status: Former   Substance and Sexual Activity    Alcohol use: No    Drug use: Not Currently     Types: Hydrocodone, Marijuana    Sexual activity: Yes     Partners: Male     Birth control/protection: Post-menopausal         Current Outpatient Medications:     albuterol (ACCUNEB) 0.63 MG/3ML nebulizer solution, Take 3 mL by nebulization Every 6 (Six) Hours As Needed for Wheezing., Disp: 360 mL, Rfl: 3    aspirin 81 MG tablet, Take 1 tablet by mouth Daily., Disp: , Rfl:     atorvastatin (LIPITOR) 20 MG tablet, TAKE 1 TABLET BY MOUTH DAILY, Disp: 90 tablet, Rfl: 1    azithromycin (Zithromax Z-Diego) 250 MG tablet, Take 2 tablets by mouth on day 1, then 1 tablet daily on days 2-5, Disp: 6 tablet, Rfl: 0    Breztri Aerosphere 160-9-4.8 MCG/ACT aerosol inhaler, , Disp: , Rfl:     ferrous sulfate 324 (65 Fe) MG tablet delayed-release EC tablet, Take 1 tablet by mouth Daily With Breakfast., Disp: , Rfl:      fexofenadine (ALLEGRA) 60 MG tablet, MUCINEX ALLERGY TABLET, Disp: , Rfl:     fluticasone (FLONASE) 50 MCG/ACT nasal spray, Administer 2 sprays into the nostril(s) as directed by provider Daily., Disp: , Rfl:     gabapentin (NEURONTIN) 400 MG capsule, TAKE 1 CAPSULE BY MOUTH 3 TIMES A DAY, Disp: 90 capsule, Rfl: 5    meloxicam (MOBIC) 15 MG tablet, TAKE 1 TABLET BY MOUTH DAILY, Disp: 90 tablet, Rfl: 1    montelukast (SINGULAIR) 10 MG tablet, TAKE ONE TABLET BY MOUTH ONCE NIGHTLY, Disp: 90 tablet, Rfl: 1    multivitamin with minerals (MULTIVITAL PO), Take 1 tablet by mouth Daily., Disp: , Rfl:     omeprazole (priLOSEC) 40 MG capsule, Take 1 capsule by mouth Daily., Disp: 90 capsule, Rfl: 3    sertraline (ZOLOFT) 25 MG tablet, TAKE 1 TABLET BY MOUTH DAILY, Disp: 90 tablet, Rfl: 1    traZODone (DESYREL) 50 MG tablet, Take 1 tablet by mouth Every Night., Disp: 30 tablet, Rfl: 0    vitamin D3 125 MCG (5000 UT) capsule capsule, Take 1 capsule by mouth Daily., Disp: , Rfl:     acetaZOLAMIDE (DIAMOX) 500 MG capsule, Take 1 capsule by mouth 2 (Two) Times a Day., Disp: 60 capsule, Rfl: 5    albuterol sulfate  (90 Base) MCG/ACT inhaler, Inhale 2 puffs As Needed., Disp: , Rfl:     Ibuprofen 3 %, Gabapentin 10 %, Baclofen 2 %, lidocaine 4 %, Ketamine HCl 4 %, Apply 1-2 g topically to the appropriate area as directed 3 (Three) to 4 (Four) times daily., Disp: 90 g, Rfl: 5    Review of Systems   All other systems reviewed and are negative.       I have reviewed ROS completed by medical assistant.     Objective:    Neurological Exam  Mental Status  Awake and alert. Oriented only to person, place, time and situation. Speech is normal. Language is fluent with no aphasia. Fund of knowledge is appropriate for level of education.    Cranial Nerves  CN II: Right visual acuity: Normal. Left visual acuity: Normal. Right normal visual field. Left normal visual field.  CN III, IV, VI: Extraocular movements intact bilaterally. No  nystagmus. Normal saccades. Normal smooth pursuit.   Right pupil: 2 mm. Reactive to light. Reactive to accommodation.   Left pupil: 2 mm. Reactive to light. Reactive to accommodation.  CN VII: Full and symmetric facial movement.    Motor  Normal muscle bulk throughout. No fasciculations present.    Sensory  Light touch is normal in upper and lower extremities.     Gait  Casual gait is normal including stance, stride, and arm swing.     Assessment/Plan:  Discussion: For neuropathy the patient will be ordered the neuropathy cream from [facility] and continue the gabapentin.  For intracranial hypertension evaluation the patient will be sent for a lumbar puncture with labs, she was in agreement with the referral to weight management, she will also be referred to ophthalmology, and she will be sent for an MR angiogram venogram of her head.  Patient will be scheduled back for a follow-up visit in approximately 6 months but is to call with any questions or concerns.  We will also contact her with results of testing as soon as they are available.  We did discuss treatments and I did prescribe acetazolamide 500 mg twice daily.  She was also notified of the use of Topamax if acetazolamide is not tolerated.  She was also notified of shunting procedures as well as optic nerve procedures that could be done also.    I also gave the patient handout on idiopathic hypertension from up to date.    Diagnoses and all orders for this visit:    1. Benign intracranial hypertension (Primary)  -     IR Lumbar Puncture Diagnosis; Future  -     Notify Provider if Patient Taking Blood Thinning Agents; Standing  -     Check & Record Opening & Closing Pressures (LP); Standing  -     CBC (No Diff); Standing  -     Glucose, CSF - Cerebrospinal Fluid, Lumbar Puncture; Standing  -     Protein, CSF - Cerebrospinal Fluid, Lumbar Puncture; Standing  -     Cell Count With Differential, CSF Use CSF Tube: 1; Standing  -     Cell Count With  Differential, CSF; Standing  -     CSF Tube - Cerebrospinal Fluid, Lumbar Puncture; Standing  -     CSF Tube - Cerebrospinal Fluid, Lumbar Puncture; Standing  -     Ambulatory Referral to Weight Management Program  -     Ambulatory Referral to Ophthalmology  -     MRI Angiogram Venogram Head; Future  -     acetaZOLAMIDE (DIAMOX) 500 MG capsule; Take 1 capsule by mouth 2 (Two) Times a Day.  Dispense: 60 capsule; Refill: 5    2. Neuropathy  -     Ibuprofen 3 %, Gabapentin 10 %, Baclofen 2 %, lidocaine 4 %, Ketamine HCl 4 %; Apply 1-2 g topically to the appropriate area as directed 3 (Three) to 4 (Four) times daily.  Dispense: 90 g; Refill: 5    Continue gabapentin 400 mg 3 times daily.    Return in about 6 months (around 9/3/2025) for Annabelle Weaver.    I spent 63 minutes caring for Grace on this date of service. This time includes time spent by me in the following activities: preparing for the visit, reviewing tests, obtaining and/or reviewing a separately obtained history, performing a medically appropriate examination and/or evaluation, counseling and educating the patient/family/caregiver, ordering medications, tests, or procedures, and documenting information in the medical record.      This document has been electronically signed by Annabelle CHRISTIANSON on March 3, 2025 08:53 EST

## 2025-03-03 ENCOUNTER — OFFICE VISIT (OUTPATIENT)
Dept: NEUROLOGY | Facility: CLINIC | Age: 50
End: 2025-03-03
Payer: COMMERCIAL

## 2025-03-03 VITALS
HEIGHT: 63 IN | DIASTOLIC BLOOD PRESSURE: 73 MMHG | BODY MASS INDEX: 51.91 KG/M2 | WEIGHT: 293 LBS | HEART RATE: 108 BPM | SYSTOLIC BLOOD PRESSURE: 129 MMHG

## 2025-03-03 DIAGNOSIS — G62.9 NEUROPATHY: ICD-10-CM

## 2025-03-03 DIAGNOSIS — G93.2 BENIGN INTRACRANIAL HYPERTENSION: Primary | ICD-10-CM

## 2025-03-03 PROCEDURE — 99215 OFFICE O/P EST HI 40 MIN: CPT | Performed by: NURSE PRACTITIONER

## 2025-03-03 RX ORDER — ACETAZOLAMIDE 500 MG/1
500 CAPSULE, EXTENDED RELEASE ORAL 2 TIMES DAILY
Qty: 60 CAPSULE | Refills: 5 | Status: SHIPPED | OUTPATIENT
Start: 2025-03-03

## 2025-03-04 ENCOUNTER — PATIENT ROUNDING (BHMG ONLY) (OUTPATIENT)
Dept: NEUROLOGY | Facility: CLINIC | Age: 50
End: 2025-03-04
Payer: MEDICARE

## 2025-03-04 NOTE — PROGRESS NOTES
Subjective   Grace Clement is a 50 y.o. female.       HPI   Pt is here today with concern of sore throat and head congestion.   Symptoms started yesterday.    She reports her grandchildren have had strep throat this week.  She has taken them both to the pediatrician for treatment.   She feels pressure in the sinus area; ears are full; nasal congestion and sore throat.  Slight cough.  No wheezes or SOA.  Denies any fevers.      The following portions of the patient's history were reviewed and updated as appropriate: allergies, current medications, past family history, past medical history, past social history, past surgical history, and problem list.    Review of Systems   Constitutional:  Negative for chills, fatigue and fever.   HENT:  Positive for congestion, postnasal drip, rhinorrhea, sinus pressure and sore throat. Negative for ear discharge, ear pain, sneezing, swollen glands and trouble swallowing.    Respiratory:  Positive for cough. Negative for shortness of breath and wheezing.    Cardiovascular:  Negative for chest pain and palpitations.   Gastrointestinal:  Negative for nausea and vomiting.   Neurological:  Negative for dizziness, weakness, light-headedness and headache.   Psychiatric/Behavioral:  Negative for depressed mood. The patient is not nervous/anxious.        Objective   Physical Exam  Vitals reviewed.   Constitutional:       General: She is not in acute distress.     Appearance: Normal appearance. She is obese.   HENT:      Head: Normocephalic and atraumatic.      Right Ear: Hearing, tympanic membrane, ear canal and external ear normal.      Left Ear: Hearing, tympanic membrane, ear canal and external ear normal.      Nose: Rhinorrhea present.      Right Sinus: No maxillary sinus tenderness or frontal sinus tenderness.      Left Sinus: No maxillary sinus tenderness or frontal sinus tenderness.      Mouth/Throat:      Lips: Pink.      Mouth: Mucous membranes are moist.      Pharynx: Posterior  oropharyngeal erythema and postnasal drip present. No pharyngeal swelling, oropharyngeal exudate or uvula swelling.   Eyes:      Conjunctiva/sclera: Conjunctivae normal.   Cardiovascular:      Rate and Rhythm: Normal rate and regular rhythm.      Pulses: Normal pulses.      Heart sounds: Normal heart sounds.   Pulmonary:      Effort: Pulmonary effort is normal. No respiratory distress.      Breath sounds: Normal breath sounds. No wheezing, rhonchi or rales.   Chest:      Chest wall: No tenderness.   Musculoskeletal:      Cervical back: Normal range of motion.   Skin:     General: Skin is warm and dry.   Neurological:      General: No focal deficit present.      Mental Status: She is alert and oriented to person, place, and time.   Psychiatric:         Mood and Affect: Mood normal.                  Procedures   Assessment & Plan   Diagnoses and all orders for this visit:    1. Upper respiratory tract infection, unspecified type (Primary)  Comments:  Given Amoxicillin.   Can use Tylenol as needed for pain.   Throat spray or lozenges as needed.   Warm salt water gargles several times daily.  Orders:  -     amoxicillin (AMOXIL) 875 MG tablet; Take 1 tablet by mouth 2 (Two) Times a Day for 10 days.  Dispense: 20 tablet; Refill: 0    2. Sore throat  Comments:  Rapid strep test is negative  Orders:  -     POC Rapid Strep A

## 2025-03-05 ENCOUNTER — OFFICE VISIT (OUTPATIENT)
Dept: FAMILY MEDICINE CLINIC | Facility: CLINIC | Age: 50
End: 2025-03-05
Payer: MEDICARE

## 2025-03-05 VITALS
WEIGHT: 293 LBS | OXYGEN SATURATION: 96 % | BODY MASS INDEX: 51.91 KG/M2 | HEART RATE: 104 BPM | DIASTOLIC BLOOD PRESSURE: 81 MMHG | HEIGHT: 63 IN | SYSTOLIC BLOOD PRESSURE: 145 MMHG | TEMPERATURE: 98.6 F

## 2025-03-05 DIAGNOSIS — J06.9 UPPER RESPIRATORY TRACT INFECTION, UNSPECIFIED TYPE: Primary | ICD-10-CM

## 2025-03-05 DIAGNOSIS — J02.9 SORE THROAT: ICD-10-CM

## 2025-03-05 LAB
EXPIRATION DATE: NORMAL
INTERNAL CONTROL: NORMAL
Lab: NORMAL
S PYO AG THROAT QL: NEGATIVE

## 2025-03-05 PROCEDURE — 87880 STREP A ASSAY W/OPTIC: CPT | Performed by: NURSE PRACTITIONER

## 2025-03-05 PROCEDURE — 1159F MED LIST DOCD IN RCRD: CPT | Performed by: NURSE PRACTITIONER

## 2025-03-05 PROCEDURE — 1125F AMNT PAIN NOTED PAIN PRSNT: CPT | Performed by: NURSE PRACTITIONER

## 2025-03-05 PROCEDURE — 1160F RVW MEDS BY RX/DR IN RCRD: CPT | Performed by: NURSE PRACTITIONER

## 2025-03-05 PROCEDURE — 99213 OFFICE O/P EST LOW 20 MIN: CPT | Performed by: NURSE PRACTITIONER

## 2025-03-05 RX ORDER — AMOXICILLIN 875 MG/1
875 TABLET, COATED ORAL 2 TIMES DAILY
Qty: 20 TABLET | Refills: 0 | Status: SHIPPED | OUTPATIENT
Start: 2025-03-05 | End: 2025-03-15

## 2025-03-19 ENCOUNTER — HOSPITAL ENCOUNTER (OUTPATIENT)
Dept: INTERVENTIONAL RADIOLOGY/VASCULAR | Facility: HOSPITAL | Age: 50
Discharge: HOME | End: 2025-03-19
Payer: MEDICARE

## 2025-03-19 VITALS
RESPIRATION RATE: 16 BRPM | OXYGEN SATURATION: 100 % | DIASTOLIC BLOOD PRESSURE: 55 MMHG | TEMPERATURE: 98.2 F | HEART RATE: 69 BPM | SYSTOLIC BLOOD PRESSURE: 109 MMHG | BODY MASS INDEX: 51.91 KG/M2 | HEIGHT: 63 IN | WEIGHT: 293 LBS

## 2025-03-19 DIAGNOSIS — G93.2 BENIGN INTRACRANIAL HYPERTENSION: ICD-10-CM

## 2025-03-19 LAB
APPEARANCE CSF: CLEAR
APTT PPP: 32.2 SECONDS (ref 22.7–35.4)
BASOPHILS # BLD AUTO: 0.02 10*3/MM3 (ref 0–0.2)
BASOPHILS NFR BLD AUTO: 0.3 % (ref 0–1.5)
COLOR CSF: COLORLESS
COLOR SPUN CSF: COLORLESS
DEPRECATED RDW RBC AUTO: 47.8 FL (ref 37–54)
EOSINOPHIL # BLD AUTO: 0.13 10*3/MM3 (ref 0–0.4)
EOSINOPHIL NFR BLD AUTO: 1.9 % (ref 0.3–6.2)
ERYTHROCYTE [DISTWIDTH] IN BLOOD BY AUTOMATED COUNT: 14.8 % (ref 12.3–15.4)
GLUCOSE CSF-MCNC: 66 MG/DL (ref 40–70)
HCT VFR BLD AUTO: 34.1 % (ref 34–46.6)
HGB BLD-MCNC: 10.4 G/DL (ref 12–15.9)
HOLD SPECIMEN: NORMAL
IMM GRANULOCYTES # BLD AUTO: 0.02 10*3/MM3 (ref 0–0.05)
IMM GRANULOCYTES NFR BLD AUTO: 0.3 % (ref 0–0.5)
INR PPP: 1.18 (ref 0.9–1.1)
LYMPHOCYTES # BLD AUTO: 1.4 10*3/MM3 (ref 0.7–3.1)
LYMPHOCYTES NFR BLD AUTO: 20.9 % (ref 19.6–45.3)
MCH RBC QN AUTO: 27.4 PG (ref 26.6–33)
MCHC RBC AUTO-ENTMCNC: 30.5 G/DL (ref 31.5–35.7)
MCV RBC AUTO: 89.7 FL (ref 79–97)
METHOD: ABNORMAL
MONOCYTES # BLD AUTO: 0.32 10*3/MM3 (ref 0.1–0.9)
MONOCYTES NFR BLD AUTO: 4.8 % (ref 5–12)
NEUTROPHILS NFR BLD AUTO: 4.82 10*3/MM3 (ref 1.7–7)
NEUTROPHILS NFR BLD AUTO: 71.8 % (ref 42.7–76)
NRBC BLD AUTO-RTO: 0 /100 WBC (ref 0–0.2)
NUC CELL # CSF MANUAL: 2 /MM3 (ref 0–5)
PLATELET # BLD AUTO: 250 10*3/MM3 (ref 140–450)
PMV BLD AUTO: 9.2 FL (ref 6–12)
PROT CSF-MCNC: 34.3 MG/DL (ref 15–45)
PROTHROMBIN TIME: 15 SECONDS (ref 11.7–14.2)
RBC # BLD AUTO: 3.8 10*6/MM3 (ref 3.77–5.28)
RBC # CSF MANUAL: 255 /MM3 (ref 0–5)
SPECIMEN VOL CSF: 3 ML
TUBE # CSF: 3
WBC NRBC COR # BLD AUTO: 6.71 10*3/MM3 (ref 3.4–10.8)

## 2025-03-19 PROCEDURE — 82945 GLUCOSE OTHER FLUID: CPT | Performed by: NURSE PRACTITIONER

## 2025-03-19 PROCEDURE — 25010000002 FENTANYL CITRATE (PF) 50 MCG/ML SOLUTION: Performed by: RADIOLOGY

## 2025-03-19 PROCEDURE — 85025 COMPLETE CBC W/AUTO DIFF WBC: CPT | Performed by: RADIOLOGY

## 2025-03-19 PROCEDURE — 85610 PROTHROMBIN TIME: CPT | Performed by: RADIOLOGY

## 2025-03-19 PROCEDURE — 85730 THROMBOPLASTIN TIME PARTIAL: CPT | Performed by: RADIOLOGY

## 2025-03-19 PROCEDURE — 84157 ASSAY OF PROTEIN OTHER: CPT | Performed by: NURSE PRACTITIONER

## 2025-03-19 PROCEDURE — 89050 BODY FLUID CELL COUNT: CPT | Performed by: NURSE PRACTITIONER

## 2025-03-19 PROCEDURE — 25010000002 LIDOCAINE 1 % SOLUTION: Performed by: RADIOLOGY

## 2025-03-19 RX ORDER — LIDOCAINE HYDROCHLORIDE 10 MG/ML
INJECTION, SOLUTION INFILTRATION; PERINEURAL AS NEEDED
Status: DISCONTINUED | OUTPATIENT
Start: 2025-03-19 | End: 2025-03-20 | Stop reason: HOSPADM

## 2025-03-19 RX ORDER — SODIUM CHLORIDE 0.9 % (FLUSH) 0.9 %
10 SYRINGE (ML) INJECTION EVERY 12 HOURS SCHEDULED
Status: DISCONTINUED | OUTPATIENT
Start: 2025-03-19 | End: 2025-03-20 | Stop reason: HOSPADM

## 2025-03-19 RX ORDER — FENTANYL CITRATE 50 UG/ML
INJECTION, SOLUTION INTRAMUSCULAR; INTRAVENOUS AS NEEDED
Status: DISCONTINUED | OUTPATIENT
Start: 2025-03-19 | End: 2025-03-20 | Stop reason: HOSPADM

## 2025-03-19 RX ORDER — SODIUM CHLORIDE 0.9 % (FLUSH) 0.9 %
10 SYRINGE (ML) INJECTION AS NEEDED
Status: DISCONTINUED | OUTPATIENT
Start: 2025-03-19 | End: 2025-03-20 | Stop reason: HOSPADM

## 2025-03-19 RX ADMIN — LIDOCAINE HYDROCHLORIDE 4 ML: 10 INJECTION, SOLUTION INFILTRATION; PERINEURAL at 12:55

## 2025-03-19 RX ADMIN — FENTANYL CITRATE 50 MCG: 50 INJECTION, SOLUTION INTRAMUSCULAR; INTRAVENOUS at 12:54

## 2025-03-19 NOTE — POST-PROCEDURE NOTE
IR POST OP NOTE    Procedure:LP under fluoro      Pre Op DX:Benign Intracranial Hypertension      Post Op DX:same      Anesthesia: Local      Findings:Opening pressure elevated.      Complications:None immediate      Provider Signature: Dr. Diony Rice

## 2025-03-21 DIAGNOSIS — J44.9 CHRONIC OBSTRUCTIVE PULMONARY DISEASE, UNSPECIFIED COPD TYPE: ICD-10-CM

## 2025-03-21 RX ORDER — MONTELUKAST SODIUM 10 MG/1
10 TABLET ORAL NIGHTLY
Qty: 90 TABLET | Refills: 1 | Status: SHIPPED | OUTPATIENT
Start: 2025-03-21

## 2025-03-27 ENCOUNTER — HOSPITAL ENCOUNTER (OUTPATIENT)
Dept: MRI IMAGING | Facility: HOSPITAL | Age: 50
Discharge: HOME | End: 2025-03-27
Admitting: NURSE PRACTITIONER
Payer: MEDICARE

## 2025-03-27 DIAGNOSIS — G93.2 BENIGN INTRACRANIAL HYPERTENSION: ICD-10-CM

## 2025-03-27 PROCEDURE — 70544 MR ANGIOGRAPHY HEAD W/O DYE: CPT

## 2025-04-07 ENCOUNTER — TELEPHONE (OUTPATIENT)
Dept: NEUROLOGY | Facility: CLINIC | Age: 50
End: 2025-04-07
Payer: MEDICARE

## 2025-04-07 DIAGNOSIS — D50.8 OTHER IRON DEFICIENCY ANEMIA: Primary | ICD-10-CM

## 2025-04-07 NOTE — TELEPHONE ENCOUNTER
The patient was phoned and notified that  Diamox can cause foods and drinks to taste different.     The patient states that she has had a marked decrease in her headaches and pounding in her ears.  She is very happy.  She is tolerating the diamox without any other problems and states that she will drink more water.    She was notified of her labs from the LP and that it showed an anemia.  She states that she was followed by Dr Clarke in Hematology.  I notified her that he did not show up on referral list and asked if she would like Dr Henderson.  She said yes and a referral was placed.     She is to call if her headache return or she has any issues taking diamox.

## 2025-04-15 ENCOUNTER — OFFICE VISIT (OUTPATIENT)
Dept: FAMILY MEDICINE CLINIC | Facility: CLINIC | Age: 50
End: 2025-04-15
Payer: MEDICARE

## 2025-04-15 VITALS
DIASTOLIC BLOOD PRESSURE: 68 MMHG | WEIGHT: 293 LBS | OXYGEN SATURATION: 97 % | TEMPERATURE: 97.7 F | BODY MASS INDEX: 52.08 KG/M2 | SYSTOLIC BLOOD PRESSURE: 148 MMHG | HEART RATE: 70 BPM

## 2025-04-15 DIAGNOSIS — G47.33 OSA (OBSTRUCTIVE SLEEP APNEA): ICD-10-CM

## 2025-04-15 DIAGNOSIS — E61.1 IRON DEFICIENCY: ICD-10-CM

## 2025-04-15 DIAGNOSIS — E78.5 HYPERLIPIDEMIA, UNSPECIFIED HYPERLIPIDEMIA TYPE: Primary | ICD-10-CM

## 2025-04-15 DIAGNOSIS — G93.2 INTRACRANIAL HYPERTENSION: ICD-10-CM

## 2025-04-15 DIAGNOSIS — G47.00 INSOMNIA, UNSPECIFIED TYPE: ICD-10-CM

## 2025-04-15 DIAGNOSIS — R60.9 EDEMA, UNSPECIFIED TYPE: ICD-10-CM

## 2025-04-15 DIAGNOSIS — G62.9 NEUROPATHY: ICD-10-CM

## 2025-04-15 DIAGNOSIS — F32.A DEPRESSION, UNSPECIFIED DEPRESSION TYPE: ICD-10-CM

## 2025-04-15 DIAGNOSIS — J44.9 CHRONIC OBSTRUCTIVE PULMONARY DISEASE, UNSPECIFIED COPD TYPE: ICD-10-CM

## 2025-04-15 RX ORDER — HYDROCHLOROTHIAZIDE 25 MG/1
25 TABLET ORAL DAILY
Qty: 30 TABLET | Refills: 0 | Status: SHIPPED | OUTPATIENT
Start: 2025-04-15

## 2025-04-15 NOTE — PROGRESS NOTES
Subjective     Grace Clement is a 50 y.o. female.     History of Present Illness  Patient is also following up on chronic medical conditions.  Doing well overall  Lives with her   Has guardianship of her 2 grandsons.  She stays active with the kids.   Pt has been having some swelling in BLE  Would like to try a water pill  The swelling goes down when she takes socks off    Benign intracranial HTN- pt is current with neuro. She is on diamox 500mg bid. HA and tinnitus has improved. Recent MRA brain was normal.      Hyperlipidemia-patient is currently on Lipitor 20 mg daily and aspirin 81 mg daily. Doing well on the medication. She eats a well balanced diet.      COPD-patient is currently on Singulair 10 mg nightly, Allegra, Flonase.  She is also on Breztri twice daily inhalation and albuterol nebulizers as needed.  She is on oxygen at 2 L when out and about.  She sees Dr. Engle with pulmonary. She states her breathing is doing well. She does not smoke- quit 6 yrs ago.      Iron deficiency anemia-patient sees hematology for this. Hasn't seen him since last year.  She has had IV iron infusions.  She is stable at this time.  She is on every other day iron supplementation.     Sleep apnea-patient sees Dr. Engle.  She uses a CPAP nightly.     Chronic back pain/neuropathy-patient sees neuro spine for this.  She is currently on gabapentin 400 mg TID. It is helping.   She also has meloxicam as needed.     Allergies-patient is currently on Singulair and OTC antihistamine     Depression-patient is currently on Zoloft 25 mg daily. She would like to increase the zoloft. She cries often at night. She has felt overwhelmed caring for her grandchildren. Would like to see a therapist.  Denies SI or HI.      Insomnia-patient is currently on trazodone 50 mg nightly.  Labs- due  Pap smear- UTD- sees GYN  Mammogram- 10/2024  DEXA- 11/2024 normal  Colonoscopy- UTD     Vaccines:  Flu- refused  PNA- refused  Shingles- refused  Tdap-  refused  Covid-19-     Dental exam-  Eye exam-         The following portions of the patient's history were reviewed and updated as appropriate: allergies, current medications, past family history, past medical history, past social history, past surgical history, and problem list.    Review of Systems   Constitutional:  Positive for fatigue. Negative for chills, diaphoresis and fever.   HENT:  Negative for congestion, sore throat and swollen glands.    Respiratory:  Negative for cough, chest tightness and shortness of breath.    Cardiovascular:  Positive for leg swelling. Negative for chest pain.   Gastrointestinal:  Negative for abdominal pain, nausea and vomiting.   Genitourinary:  Negative for dysuria.   Musculoskeletal:  Negative for myalgias and neck pain.   Skin:  Negative for rash.   Neurological:  Negative for dizziness, weakness, numbness and headache.   Psychiatric/Behavioral:  Positive for depressed mood and stress. Negative for self-injury and suicidal ideas.        Objective     /68 (BP Location: Left arm, Patient Position: Sitting, Cuff Size: Large Adult)   Pulse 70   Temp 97.7 °F (36.5 °C) (Tympanic)   Wt 133 kg (294 lb)   SpO2 97%   BMI 52.08 kg/m²     Current Outpatient Medications on File Prior to Visit   Medication Sig Dispense Refill    acetaZOLAMIDE (DIAMOX) 500 MG capsule Take 1 capsule by mouth 2 (Two) Times a Day. 60 capsule 5    albuterol (ACCUNEB) 0.63 MG/3ML nebulizer solution Take 3 mL by nebulization Every 6 (Six) Hours As Needed for Wheezing. 360 mL 3    albuterol sulfate  (90 Base) MCG/ACT inhaler Inhale 2 puffs As Needed.      aspirin 81 MG tablet Take 1 tablet by mouth Daily.      atorvastatin (LIPITOR) 20 MG tablet TAKE 1 TABLET BY MOUTH DAILY 90 tablet 1    Breztri Aerosphere 160-9-4.8 MCG/ACT aerosol inhaler       ferrous sulfate 324 (65 Fe) MG tablet delayed-release EC tablet Take 1 tablet by mouth Daily With Breakfast.      fexofenadine (ALLEGRA) 60 MG tablet  MUCINEX ALLERGY TABLET      fluticasone (FLONASE) 50 MCG/ACT nasal spray Administer 2 sprays into the nostril(s) as directed by provider Daily.      gabapentin (NEURONTIN) 400 MG capsule TAKE 1 CAPSULE BY MOUTH 3 TIMES A DAY 90 capsule 5    Ibuprofen 3 %, Gabapentin 10 %, Baclofen 2 %, lidocaine 4 %, Ketamine HCl 4 % Apply 1-2 g topically to the appropriate area as directed 3 (Three) to 4 (Four) times daily. 90 g 5    meloxicam (MOBIC) 15 MG tablet TAKE 1 TABLET BY MOUTH DAILY 90 tablet 1    montelukast (SINGULAIR) 10 MG tablet TAKE ONE TABLET BY MOUTH ONCE NIGHTLY 90 tablet 1    multivitamin with minerals (MULTIVITAL PO) Take 1 tablet by mouth Daily.      omeprazole (priLOSEC) 40 MG capsule Take 1 capsule by mouth Daily. 90 capsule 3    traZODone (DESYREL) 50 MG tablet Take 1 tablet by mouth Every Night. 30 tablet 0    vitamin D3 125 MCG (5000 UT) capsule capsule Take 1 capsule by mouth Daily.      [DISCONTINUED] azithromycin (Zithromax Z-Diego) 250 MG tablet Take 2 tablets by mouth on day 1, then 1 tablet daily on days 2-5 6 tablet 0    [DISCONTINUED] sertraline (ZOLOFT) 25 MG tablet TAKE 1 TABLET BY MOUTH DAILY 90 tablet 1     No current facility-administered medications on file prior to visit.                 Physical Exam  Constitutional:       General: She is not in acute distress.     Appearance: Normal appearance. She is not ill-appearing.   HENT:      Head: Normocephalic and atraumatic.   Eyes:      Extraocular Movements: Extraocular movements intact.   Cardiovascular:      Rate and Rhythm: Normal rate and regular rhythm.      Heart sounds: No murmur heard.  Pulmonary:      Effort: Pulmonary effort is normal. No respiratory distress.   Musculoskeletal:      Right lower leg: Edema present.      Left lower leg: Edema present.   Neurological:      General: No focal deficit present.      Mental Status: She is alert and oriented to person, place, and time.   Psychiatric:         Mood and Affect: Mood normal.          Behavior: Behavior normal.         Thought Content: Thought content normal.         Judgment: Judgment normal.           Assessment & Plan     Diagnoses and all orders for this visit:    1. Hyperlipidemia, unspecified hyperlipidemia type (Primary)  Comments:  stable  cont lipitor  work on diet and exercise  Orders:  -     Comprehensive Metabolic Panel; Future  -     Lipid Panel; Future    2. Depression, unspecified depression type  Comments:  deteriorated  inc zoloft to 50mg daily  referral to therapy  denies SI or HI  f/u 1 mo  Orders:  -     Ambulatory Referral to Behavioral Health  -     sertraline (Zoloft) 50 MG tablet; Take 1 tablet by mouth Daily.  Dispense: 30 tablet; Refill: 2    3. Chronic obstructive pulmonary disease, unspecified COPD type  Comments:  stable  cont breztri  sees pulm    4. Neuropathy  Comments:  stable  sees neuro  cont gabapentin    5. Iron deficiency  Comments:  sees hematology  Orders:  -     CBC & Differential  -     Iron and TIBC; Future    6. ARIELLA (obstructive sleep apnea)  Comments:  stable  sees sleep med  wears CPAP    7. Intracranial hypertension  Comments:  sees neuro  had lumbar puncture  on diamox- HA improved  recent MRA normal    8. Insomnia, unspecified type  Comments:  stable  cont trazodone    9. Edema, unspecified type  Comments:  new issue  start Hctz  monitor renal fx  check labs  Orders:  -     hydroCHLOROthiazide 25 MG tablet; Take 1 tablet by mouth Daily.  Dispense: 30 tablet; Refill: 0

## 2025-04-28 DIAGNOSIS — R10.13 EPIGASTRIC PAIN: ICD-10-CM

## 2025-04-28 RX ORDER — OMEPRAZOLE 40 MG/1
40 CAPSULE, DELAYED RELEASE ORAL DAILY
Qty: 30 CAPSULE | Refills: 3 | Status: SHIPPED | OUTPATIENT
Start: 2025-04-28

## 2025-05-01 DIAGNOSIS — G93.2 BENIGN INTRACRANIAL HYPERTENSION: Primary | ICD-10-CM

## 2025-05-01 RX ORDER — TOPIRAMATE 50 MG/1
TABLET, FILM COATED ORAL
Qty: 180 TABLET | Refills: 3 | Status: SHIPPED | OUTPATIENT
Start: 2025-05-01

## 2025-05-02 ENCOUNTER — TELEPHONE (OUTPATIENT)
Dept: NEUROLOGY | Facility: CLINIC | Age: 50
End: 2025-05-02
Payer: MEDICARE

## 2025-05-06 NOTE — PROGRESS NOTES
HEMATOLOGY ONCOLOGY FOLLOW UP        Patient name: Grace Clement  : 1975  MRN: [id number]  Primary Care Physician: Jaye Ogden APRN  Referring Physician: No ref. provider found  Reason For Consult:     History of Present Illness:    2022: In the office for initial evaluation. Had anemia for a long time, at least for 3 years. For the most part normocytic. Not clearly progressive and not associated to any symptoms. Complained of fatigue but carried a history of severe chronic obstructive pulmonary disease and was on oxygen for at least 2 years. Also suffered from very severe obesity. Had been limited by her dyspnea and difficulties with standing up for long periods of time. Eating well and no nausea or vomiting. Persistently dyspneic and with some cough and expectoration. No chest pain or dysphagia. No abdominal pain and no diarrhea. Denied melena, hematochezia or hematuria. Had been taking meloxicam regularly. Intermittently taking ibuprofen 400 mg per dose. Also receiving daily low dose acetyl salicylic acid.     2022: Feeling about the same. Eating well and no weight loss. No bleeding. No fever. On exam there was no significant change. The laboratory exams were consistent with iron deficiency. A decision was made to start treatment with oral iron. She was given instructions and prescriptions were sent. She was asked to return in 2 months     3/9/2023: Without much change but feeling somewhat better.  Stronger.  Able to take the iron without side effects.  The physical exam was unchanged.  The laboratory exams revealed very modest improvement in the hemoglobin.  A decision was made to investigate iron again as she may be absorbing the supplement poorly and may justify treatment with intravenous iron.    2023: Feeling about the same as before. Still having some fatigue. Went back to school and is able to fulfill her duties. Eating as much as before and no weight  loss. Denied increasing dyspnea though still needing oxygen. No melena or hematochezia. Able to take the iron every other day without difficulties and taking it compliantly. The exam without significant changes. BMI greater than 50 kg/m2. The laboratory exams were reviewed and discussed with her. Persisted with anemia of the same degree as before. The iron had increased some but her total iron binding capacity remained subnormal. A decision was made to treat her with intravenous iron. Side effects and potential complications were described and the measures taken to reduce risk were explained. She was interested in proceeding.     6/23/2023: Received the intravenous iron without complications. Feeling better at this time. No chest pain and no cough. No abdominal pain or diarrhea. No dysuria. Persists with edema of the lower extremities. On exam no changes. The laboratory exams revealed evidence of improvement of the hemoglobin and resolution of the microcytosis. A decision was made to continue to follow and she was asked to see me in 4 months.     9/29/2023: Feeling well and without new symptoms. Dyspneic with exertion and still using 2 lpm of oxygen. No bleeding. No chest pain or cough and no abdominal pain. On exam alert. Chronically ill appearing woman. No distress. No jaundice. No oral lesions and lungs diminished bilaterally. Heart regular. Abdomen soft and not tender. No edema. The laboratory exams reported near complete resolution of her anemia. Her white blood cell count and differential and platelet count were unremarkable. A decision was made to continue observation.     5/9/2025: Back after a long absence.  Recently saw the nurse practitioner who obtained iron and told her she was very low.  She has been feeling well.  She no longer has vaginal bleeding and apparently the vaginal bleeding she had when last seen was physiologic.  No pathology was identified.  She continues to eat well and has not lost  weight.  She remains dyspneic and continues to use oxygen at the same rate as before.  No chest pains.  No abdominal pain.  Maintains regular bowel activity and has not had melena or hematochezia.  No dysuria.  She continues to take iron by mouth every of the day.  On exam she seems chronically ill.  She is oriented and conversant.  Appears much older than the stated age.  Lungs are diminished bilaterally.  Heart regular.  Abdomen protuberant.  Difficult to tell any details given the body habitus.  No edema.  Laboratory exams reviewed.  Perhaps she has iron deficiency again but ferritin has not been checked in a long time.  Will recheck ferritin and will see with results.    Past Medical History:   Diagnosis Date    Allergic 1994    Anemia 2023    Arthritis     Asthma     COPD (chronic obstructive pulmonary disease)     Difficulty walking April 2020    Hyperlipidemia     Obesity     Vitamin D deficiency      Past Surgical History:   Procedure Laterality Date    CHOLECYSTECTOMY      COLONOSCOPY  7/20/2022    CRYOABLATION      LAPAROSCOPIC TUBAL LIGATION      TUBAL ABDOMINAL LIGATION  6/16/2005       Current Outpatient Medications:     acetaZOLAMIDE (DIAMOX) 500 MG capsule, , Disp: , Rfl:     albuterol (ACCUNEB) 0.63 MG/3ML nebulizer solution, Take 3 mL by nebulization Every 6 (Six) Hours As Needed for Wheezing., Disp: 360 mL, Rfl: 3    aspirin 81 MG tablet, Take 1 tablet by mouth Daily., Disp: , Rfl:     atorvastatin (LIPITOR) 20 MG tablet, TAKE 1 TABLET BY MOUTH DAILY, Disp: 90 tablet, Rfl: 1    Breztri Aerosphere 160-9-4.8 MCG/ACT aerosol inhaler, , Disp: , Rfl:     ferrous sulfate 324 (65 Fe) MG tablet delayed-release EC tablet, Take 1 tablet by mouth Daily With Breakfast., Disp: , Rfl:     fexofenadine (ALLEGRA) 60 MG tablet, MUCINEX ALLERGY TABLET, Disp: , Rfl:     fluticasone (FLONASE) 50 MCG/ACT nasal spray, Administer 2 sprays into the nostril(s) as directed by provider Daily., Disp: , Rfl:     gabapentin  (NEURONTIN) 400 MG capsule, TAKE 1 CAPSULE BY MOUTH 3 TIMES A DAY, Disp: 90 capsule, Rfl: 5    hydroCHLOROthiazide 25 MG tablet, Take 1 tablet by mouth Daily., Disp: 30 tablet, Rfl: 0    Ibuprofen 3 %, Gabapentin 10 %, Baclofen 2 %, lidocaine 4 %, Ketamine HCl 4 %, Apply 1-2 g topically to the appropriate area as directed 3 (Three) to 4 (Four) times daily., Disp: 90 g, Rfl: 5    meloxicam (MOBIC) 15 MG tablet, TAKE 1 TABLET BY MOUTH DAILY, Disp: 90 tablet, Rfl: 1    montelukast (SINGULAIR) 10 MG tablet, TAKE ONE TABLET BY MOUTH ONCE NIGHTLY, Disp: 90 tablet, Rfl: 1    multivitamin with minerals (MULTIVITAL PO), Take 1 tablet by mouth Daily., Disp: , Rfl:     omeprazole (priLOSEC) 40 MG capsule, TAKE 1 CAPSULE BY MOUTH DAILY, Disp: 30 capsule, Rfl: 3    sertraline (Zoloft) 50 MG tablet, Take 1 tablet by mouth Daily., Disp: 30 tablet, Rfl: 2    topiramate (Topamax) 50 MG tablet, Wk1: 1 tab bid, Wk2: 2 tabs bid, Wk3: 3 tabs bid, Disp: 180 tablet, Rfl: 3    traZODone (DESYREL) 50 MG tablet, Take 1 tablet by mouth Every Night., Disp: 30 tablet, Rfl: 0    vitamin D3 125 MCG (5000 UT) capsule capsule, Take 1 capsule by mouth Daily., Disp: , Rfl:     albuterol sulfate  (90 Base) MCG/ACT inhaler, Inhale 2 puffs As Needed., Disp: , Rfl:     Allergies   Allergen Reactions    Codeine Hives, Itching and Rash     Family History   Problem Relation Age of Onset    Alcohol abuse Mother     COPD Mother     Depression Mother     Vision loss Mother     Early death Mother     Alcohol abuse Father     Early death Father     Alcohol abuse Sister     Depression Sister     Miscarriages / Stillbirths Sister     Learning disabilities Sister     Mental illness Sister     Miscarriages / Stillbirths Sister     Alcohol abuse Brother     COPD Brother     Hyperlipidemia Brother     Drug abuse Brother     Early death Brother     Arthritis Brother     Drug abuse Brother     Cancer Maternal Uncle     Early death Maternal Uncle     Heart disease  Maternal Grandmother     Early death Maternal Grandmother     Anxiety disorder Daughter     Asthma Daughter     Depression Daughter     Drug abuse Daughter     Learning disabilities Daughter     Mental illness Daughter      Cancer-related family history includes Cancer in her maternal uncle.    Social History     Tobacco Use    Smoking status: Former     Current packs/day: 0.00     Average packs/day: 1.5 packs/day for 35.1 years (52.7 ttl pk-yrs)     Types: Cigarettes     Start date: 3/10/1984     Quit date: 2019     Years since quittin.0    Smokeless tobacco: Never   Vaping Use    Vaping status: Former   Substance Use Topics    Alcohol use: No    Drug use: Not Currently     Types: Hydrocodone, Marijuana     Social History     Social History Narrative    Not on file      ROS:   Review of Systems   Constitutional:  Negative for activity change, appetite change, chills, diaphoresis, fatigue, fever and unexpected weight change.   HENT:  Negative for congestion, dental problem, drooling, ear discharge, ear pain, facial swelling, hearing loss, mouth sores, nosebleeds, postnasal drip, rhinorrhea, sinus pressure, sinus pain, sneezing, sore throat, tinnitus, trouble swallowing and voice change.    Eyes:  Negative for photophobia, pain, discharge, redness, itching and visual disturbance.   Respiratory:  Negative for apnea, cough, choking, chest tightness, shortness of breath, wheezing and stridor.    Cardiovascular:  Negative for chest pain, palpitations and leg swelling.   Gastrointestinal:  Negative for abdominal distention, abdominal pain, anal bleeding, blood in stool, constipation, diarrhea, nausea, rectal pain and vomiting.   Endocrine: Negative for cold intolerance, heat intolerance, polydipsia and polyuria.   Genitourinary:  Negative for decreased urine volume, difficulty urinating, dysuria, flank pain, frequency, genital sores, hematuria, urgency and vaginal bleeding.   Musculoskeletal:  Negative for  "arthralgias, back pain, gait problem, joint swelling, myalgias, neck pain and neck stiffness.   Skin:  Negative for color change, pallor and rash.   Neurological:  Negative for dizziness, tremors, seizures, syncope, facial asymmetry, speech difficulty, weakness, light-headedness, numbness and headaches.   Hematological:  Negative for adenopathy. Does not bruise/bleed easily.   Psychiatric/Behavioral:  Negative for agitation, behavioral problems, confusion, decreased concentration, hallucinations, self-injury, sleep disturbance and suicidal ideas. The patient is not nervous/anxious.      Objective:  Vital signs:  Vitals:    05/09/25 0806   BP: 142/72   Pulse: 65   Resp: 14   Temp: 97.5 °F (36.4 °C)   SpO2: 99%   Weight: 134 kg (294 lb 9.6 oz)   Height: 160 cm (63\")   PainSc: 0-No pain     Body mass index is 52.19 kg/m².  ECOG  (2) Ambulatory and capable of self care, unable to carry out work activity, up and about > 50% or waking hours    Physical Exam:   Physical Exam  Constitutional:       General: She is not in acute distress.     Appearance: She is ill-appearing. She is not toxic-appearing or diaphoretic.      Comments: Chronically ill appearing.  BMI greater than 54 kg/m².   HENT:      Head: Normocephalic and atraumatic.      Right Ear: External ear normal.      Left Ear: External ear normal.      Nose: Nose normal.      Mouth/Throat:      Mouth: Mucous membranes are moist.      Pharynx: Oropharynx is clear. No oropharyngeal exudate or posterior oropharyngeal erythema.   Eyes:      General: No scleral icterus.        Right eye: No discharge.         Left eye: No discharge.      Conjunctiva/sclera: Conjunctivae normal.      Pupils: Pupils are equal, round, and reactive to light.   Cardiovascular:      Rate and Rhythm: Normal rate and regular rhythm.      Pulses: Normal pulses.      Heart sounds: No murmur heard.     No friction rub. No gallop.   Pulmonary:      Effort: No respiratory distress.      Breath sounds: " No stridor. No wheezing, rhonchi or rales.   Abdominal:      General: Abdomen is flat. Bowel sounds are normal. There is no distension.      Palpations: Abdomen is soft. There is no mass.      Tenderness: There is no abdominal tenderness. There is no right CVA tenderness, left CVA tenderness, guarding or rebound.      Hernia: No hernia is present.   Musculoskeletal:         General: No swelling, tenderness, deformity or signs of injury.      Cervical back: No rigidity.      Right lower leg: No edema.      Left lower leg: No edema.   Lymphadenopathy:      Cervical: No cervical adenopathy.   Skin:     Coloration: Skin is not jaundiced.      Findings: No bruising, lesion or rash.   Neurological:      General: No focal deficit present.      Mental Status: She is alert and oriented to person, place, and time.      Cranial Nerves: No cranial nerve deficit.      Motor: No weakness.      Gait: Gait normal.   Psychiatric:         Mood and Affect: Mood normal.         Behavior: Behavior normal.         Thought Content: Thought content normal.         Judgment: Judgment normal.     SHAYAN Resendiz MD performed the physical exam on 5/9/2025 as documented above.    Lab Results - Last 18 Months   Lab Units 03/19/25  1154 10/25/24  0845 08/06/24  1119   WBC 10*3/mm3 6.71 4.25 17.93*   HEMOGLOBIN g/dL 10.4* 10.5* 9.8*   HEMATOCRIT % 34.1 31.0* 30.7*   PLATELETS 10*3/mm3 250 217 229   MCV fL 89.7 88.6 90.3     Lab Results - Last 18 Months   Lab Units 10/25/24  0845 03/14/24  1025   SODIUM mmol/L 142 141   POTASSIUM mmol/L 3.6 4.1   CHLORIDE mmol/L 106 105   CO2 mmol/L 25.7 24.7   BUN mg/dL 11 11   CREATININE mg/dL 0.87 0.89   CALCIUM mg/dL 9.2 9.0   BILIRUBIN mg/dL 0.8 1.1   ALK PHOS U/L 62 69   ALT (SGPT) U/L 16 19   AST (SGOT) U/L 20 22   GLUCOSE mg/dL 98 109*     Lab Results   Component Value Date    GLUCOSE 98 10/25/2024    BUN 11 10/25/2024    CREATININE 0.87 10/25/2024    EGFRIFNONA 88 04/03/2020    BCR 12.6 10/25/2024    K  3.6 10/25/2024    CO2 25.7 10/25/2024    CALCIUM 9.2 10/25/2024    ALBUMIN 4.1 10/25/2024    LABIL2 1.5 04/20/2022    AST 20 10/25/2024    ALT 16 10/25/2024     Lab Results   Component Value Date    IRON 47 10/25/2024    TIBC 316 10/25/2024    FERRITIN 48.21 04/21/2023     Lab Results   Component Value Date    FOLATE 7.97 10/28/2022     Lab Results   Component Value Date    HTHPNISB50 669 12/02/2022     Lab Results   Component Value Date    SPEP Comment 10/28/2022     Lab Results   Component Value Date    PHILIPPE Positive (A) 10/28/2022    SEDRATE 11 10/28/2022     Lab Results   Component Value Date    PTT 32.2 03/19/2025    INR 1.18 (H) 03/19/2025     Lab Results   Component Value Date    SEDRATE 11 10/28/2022     Assessment & Plan     Assessment:  Iron deficiency anemia: Appeared resolved.  At the time she was having vaginal bleeding.  She is no longer bleeding.  Will recheck iron and discussed options.  Postmenopausal vaginal bleeding: Resolved.  BMI greater than 54 Kg/m2  She is to see me in a few weeks with results.    Plan:  As above.    Oleksandr Resendiz MD on 5/9/2025 at 8:21 AM.

## 2025-05-09 ENCOUNTER — OFFICE VISIT (OUTPATIENT)
Dept: ONCOLOGY | Facility: CLINIC | Age: 50
End: 2025-05-09
Payer: MEDICARE

## 2025-05-09 VITALS
DIASTOLIC BLOOD PRESSURE: 72 MMHG | OXYGEN SATURATION: 99 % | WEIGHT: 293 LBS | TEMPERATURE: 97.5 F | HEIGHT: 63 IN | SYSTOLIC BLOOD PRESSURE: 142 MMHG | BODY MASS INDEX: 51.91 KG/M2 | HEART RATE: 65 BPM | RESPIRATION RATE: 14 BRPM

## 2025-05-09 DIAGNOSIS — E61.1 IRON DEFICIENCY: Primary | ICD-10-CM

## 2025-05-09 PROCEDURE — 1126F AMNT PAIN NOTED NONE PRSNT: CPT | Performed by: INTERNAL MEDICINE

## 2025-05-09 PROCEDURE — 1160F RVW MEDS BY RX/DR IN RCRD: CPT | Performed by: INTERNAL MEDICINE

## 2025-05-09 PROCEDURE — 99214 OFFICE O/P EST MOD 30 MIN: CPT | Performed by: INTERNAL MEDICINE

## 2025-05-09 PROCEDURE — 1159F MED LIST DOCD IN RCRD: CPT | Performed by: INTERNAL MEDICINE

## 2025-05-09 RX ORDER — ACETAZOLAMIDE 500 MG/1
CAPSULE, EXTENDED RELEASE ORAL
COMMUNITY
Start: 2025-05-03

## 2025-05-09 NOTE — ADDENDUM NOTE
Addended by: RONALDO BROUSSARD on: 5/9/2025 08:30 AM     Modules accepted: Orders     Pt informed of order for Xrays.  She will go to  after 3:30 today.

## 2025-05-14 ENCOUNTER — LAB (OUTPATIENT)
Dept: FAMILY MEDICINE CLINIC | Facility: CLINIC | Age: 50
End: 2025-05-14
Payer: MEDICARE

## 2025-05-14 DIAGNOSIS — E61.1 IRON DEFICIENCY: ICD-10-CM

## 2025-05-14 DIAGNOSIS — E78.5 HYPERLIPIDEMIA, UNSPECIFIED HYPERLIPIDEMIA TYPE: ICD-10-CM

## 2025-05-14 LAB
ALBUMIN SERPL-MCNC: 4.2 G/DL (ref 3.5–5.2)
ALBUMIN/GLOB SERPL: 1.6 G/DL
ALP SERPL-CCNC: 82 U/L (ref 39–117)
ALT SERPL W P-5'-P-CCNC: 30 U/L (ref 1–33)
ANION GAP SERPL CALCULATED.3IONS-SCNC: 12.5 MMOL/L (ref 5–15)
AST SERPL-CCNC: 33 U/L (ref 1–32)
BASOPHILS # BLD AUTO: 0.01 10*3/MM3 (ref 0–0.2)
BASOPHILS NFR BLD AUTO: 0.2 % (ref 0–1.5)
BILIRUB SERPL-MCNC: 0.7 MG/DL (ref 0–1.2)
BUN SERPL-MCNC: 12 MG/DL (ref 6–20)
BUN/CREAT SERPL: 10.3 (ref 7–25)
CALCIUM SPEC-SCNC: 9.2 MG/DL (ref 8.6–10.5)
CHLORIDE SERPL-SCNC: 104 MMOL/L (ref 98–107)
CHOLEST SERPL-MCNC: 133 MG/DL (ref 0–200)
CO2 SERPL-SCNC: 24.5 MMOL/L (ref 22–29)
CREAT SERPL-MCNC: 1.17 MG/DL (ref 0.57–1)
DEPRECATED RDW RBC AUTO: 44.2 FL (ref 37–54)
EGFRCR SERPLBLD CKD-EPI 2021: 56.3 ML/MIN/1.73
EOSINOPHIL # BLD AUTO: 0.17 10*3/MM3 (ref 0–0.4)
EOSINOPHIL NFR BLD AUTO: 2.8 % (ref 0.3–6.2)
ERYTHROCYTE [DISTWIDTH] IN BLOOD BY AUTOMATED COUNT: 14.9 % (ref 12.3–15.4)
GLOBULIN UR ELPH-MCNC: 2.7 GM/DL
GLUCOSE SERPL-MCNC: 118 MG/DL (ref 65–99)
HCT VFR BLD AUTO: 33.7 % (ref 34–46.6)
HDLC SERPL-MCNC: 32 MG/DL (ref 40–60)
HGB BLD-MCNC: 11.2 G/DL (ref 12–15.9)
IMM GRANULOCYTES # BLD AUTO: 0.02 10*3/MM3 (ref 0–0.05)
IMM GRANULOCYTES NFR BLD AUTO: 0.3 % (ref 0–0.5)
IRON 24H UR-MRATE: 42 MCG/DL (ref 37–145)
IRON SATN MFR SERPL: 13 % (ref 20–50)
LDLC SERPL CALC-MCNC: 66 MG/DL (ref 0–100)
LDLC/HDLC SERPL: 1.84 {RATIO}
LYMPHOCYTES # BLD AUTO: 1.11 10*3/MM3 (ref 0.7–3.1)
LYMPHOCYTES NFR BLD AUTO: 18.3 % (ref 19.6–45.3)
MCH RBC QN AUTO: 27.9 PG (ref 26.6–33)
MCHC RBC AUTO-ENTMCNC: 33.2 G/DL (ref 31.5–35.7)
MCV RBC AUTO: 84 FL (ref 79–97)
MONOCYTES # BLD AUTO: 0.33 10*3/MM3 (ref 0.1–0.9)
MONOCYTES NFR BLD AUTO: 5.5 % (ref 5–12)
NEUTROPHILS NFR BLD AUTO: 4.41 10*3/MM3 (ref 1.7–7)
NEUTROPHILS NFR BLD AUTO: 72.9 % (ref 42.7–76)
NRBC BLD AUTO-RTO: 0 /100 WBC (ref 0–0.2)
PLATELET # BLD AUTO: 259 10*3/MM3 (ref 140–450)
PMV BLD AUTO: 9.5 FL (ref 6–12)
POTASSIUM SERPL-SCNC: 3.6 MMOL/L (ref 3.5–5.2)
PROT SERPL-MCNC: 6.9 G/DL (ref 6–8.5)
RBC # BLD AUTO: 4.01 10*6/MM3 (ref 3.77–5.28)
SODIUM SERPL-SCNC: 141 MMOL/L (ref 136–145)
TIBC SERPL-MCNC: 334 MCG/DL (ref 298–536)
TRANSFERRIN SERPL-MCNC: 224 MG/DL (ref 200–360)
TRIGL SERPL-MCNC: 210 MG/DL (ref 0–150)
VLDLC SERPL-MCNC: 35 MG/DL (ref 5–40)
WBC NRBC COR # BLD AUTO: 6.05 10*3/MM3 (ref 3.4–10.8)

## 2025-05-14 PROCEDURE — 85025 COMPLETE CBC W/AUTO DIFF WBC: CPT | Performed by: NURSE PRACTITIONER

## 2025-05-14 PROCEDURE — 80061 LIPID PANEL: CPT | Performed by: NURSE PRACTITIONER

## 2025-05-14 PROCEDURE — 84466 ASSAY OF TRANSFERRIN: CPT | Performed by: NURSE PRACTITIONER

## 2025-05-14 PROCEDURE — 36415 COLL VENOUS BLD VENIPUNCTURE: CPT

## 2025-05-14 PROCEDURE — 80053 COMPREHEN METABOLIC PANEL: CPT | Performed by: NURSE PRACTITIONER

## 2025-05-14 PROCEDURE — 83540 ASSAY OF IRON: CPT | Performed by: NURSE PRACTITIONER

## 2025-05-14 NOTE — PROGRESS NOTES
Subjective     Grace Clement is a 50 y.o. female.     History of Present Illness  Patient is here today for a 1 month follow-up on depression and edema.    Depression-patient's Zoloft was increased to 50 mg daily.  She reports that her mood has improved. Denies any side effects. Denies any SI or HI. She is happy with this dose.     Edema-patient was started on hydrochlorothiazide 25 mg daily. She states the medication has helped with the swelling. Denies any side effect. Denies CP, SOA, dizziness, HA.        The following portions of the patient's history were reviewed and updated as appropriate: allergies, current medications, past family history, past medical history, past social history, past surgical history, and problem list.    Review of Systems   Constitutional:  Positive for fatigue. Negative for chills, diaphoresis and fever.   HENT:  Negative for congestion, sore throat and swollen glands.    Respiratory:  Negative for chest tightness and shortness of breath.    Cardiovascular:  Negative for chest pain, palpitations and leg swelling.   Gastrointestinal:  Negative for abdominal pain, nausea and vomiting.   Genitourinary:  Negative for dysuria.   Musculoskeletal:  Negative for myalgias and neck pain.   Skin:  Negative for rash.   Neurological:  Negative for dizziness, weakness, numbness and headache.       Objective     /75 (BP Location: Left arm, Patient Position: Sitting, Cuff Size: Large Adult)   Pulse 82   Temp 98 °F (36.7 °C) (Tympanic)   Wt 132 kg (292 lb)   SpO2 97%   BMI 51.73 kg/m²     Current Outpatient Medications on File Prior to Visit   Medication Sig Dispense Refill    albuterol (ACCUNEB) 0.63 MG/3ML nebulizer solution Take 3 mL by nebulization Every 6 (Six) Hours As Needed for Wheezing. 360 mL 3    albuterol sulfate  (90 Base) MCG/ACT inhaler Inhale 2 puffs As Needed.      aspirin 81 MG tablet Take 1 tablet by mouth Daily.      atorvastatin (LIPITOR) 20 MG tablet TAKE 1  TABLET BY MOUTH DAILY 90 tablet 1    Breztri Aerosphere 160-9-4.8 MCG/ACT aerosol inhaler       ferrous sulfate 324 (65 Fe) MG tablet delayed-release EC tablet Take 1 tablet by mouth Daily With Breakfast.      fexofenadine (ALLEGRA) 60 MG tablet MUCINEX ALLERGY TABLET      fluticasone (FLONASE) 50 MCG/ACT nasal spray Administer 2 sprays into the nostril(s) as directed by provider Daily.      gabapentin (NEURONTIN) 400 MG capsule TAKE 1 CAPSULE BY MOUTH 3 TIMES A DAY 90 capsule 5    Ibuprofen 3 %, Gabapentin 10 %, Baclofen 2 %, lidocaine 4 %, Ketamine HCl 4 % Apply 1-2 g topically to the appropriate area as directed 3 (Three) to 4 (Four) times daily. 90 g 5    meloxicam (MOBIC) 15 MG tablet TAKE 1 TABLET BY MOUTH DAILY 90 tablet 1    montelukast (SINGULAIR) 10 MG tablet TAKE ONE TABLET BY MOUTH ONCE NIGHTLY 90 tablet 1    multivitamin with minerals (MULTIVITAL PO) Take 1 tablet by mouth Daily.      omeprazole (priLOSEC) 40 MG capsule TAKE 1 CAPSULE BY MOUTH DAILY 30 capsule 3    topiramate (Topamax) 50 MG tablet Wk1: 1 tab bid, Wk2: 2 tabs bid, Wk3: 3 tabs bid 180 tablet 3    traZODone (DESYREL) 50 MG tablet Take 1 tablet by mouth Every Night. 30 tablet 0    vitamin D3 125 MCG (5000 UT) capsule capsule Take 1 capsule by mouth Daily.      [DISCONTINUED] acetaZOLAMIDE (DIAMOX) 500 MG capsule       [DISCONTINUED] hydroCHLOROthiazide 25 MG tablet Take 1 tablet by mouth Daily. 30 tablet 0    [DISCONTINUED] sertraline (Zoloft) 50 MG tablet Take 1 tablet by mouth Daily. 30 tablet 2     No current facility-administered medications on file prior to visit.                 Physical Exam  Constitutional:       General: She is not in acute distress.     Appearance: Normal appearance. She is not ill-appearing.   HENT:      Head: Normocephalic and atraumatic.   Eyes:      Extraocular Movements: Extraocular movements intact.   Cardiovascular:      Rate and Rhythm: Normal rate and regular rhythm.      Heart sounds: No murmur  heard.  Pulmonary:      Effort: Pulmonary effort is normal. No respiratory distress.      Breath sounds: No wheezing or rales.   Musculoskeletal:         General: No swelling.   Neurological:      General: No focal deficit present.      Mental Status: She is alert and oriented to person, place, and time.   Psychiatric:         Mood and Affect: Mood normal.         Behavior: Behavior normal.         Thought Content: Thought content normal.         Judgment: Judgment normal.           Assessment & Plan     Diagnoses and all orders for this visit:    1. Depression, unspecified depression type (Primary)  Comments:  improved  cont zoloft 50mg daily  denies SI or HI  Orders:  -     sertraline (Zoloft) 50 MG tablet; Take 1 tablet by mouth Daily.  Dispense: 90 tablet; Refill: 2    2. Edema, unspecified type  Comments:  improved  cont Hctz  kidney function stable- recheck in 1 mo  Orders:  -     Basic Metabolic Panel; Future  -     hydroCHLOROthiazide 25 MG tablet; Take 1 tablet by mouth Daily.  Dispense: 90 tablet; Refill: 1

## 2025-05-15 ENCOUNTER — OFFICE VISIT (OUTPATIENT)
Dept: FAMILY MEDICINE CLINIC | Facility: CLINIC | Age: 50
End: 2025-05-15
Payer: MEDICARE

## 2025-05-15 VITALS
HEART RATE: 82 BPM | OXYGEN SATURATION: 97 % | SYSTOLIC BLOOD PRESSURE: 120 MMHG | WEIGHT: 292 LBS | TEMPERATURE: 98 F | BODY MASS INDEX: 51.73 KG/M2 | DIASTOLIC BLOOD PRESSURE: 75 MMHG

## 2025-05-15 DIAGNOSIS — R60.9 EDEMA, UNSPECIFIED TYPE: ICD-10-CM

## 2025-05-15 DIAGNOSIS — F32.A DEPRESSION, UNSPECIFIED DEPRESSION TYPE: Primary | ICD-10-CM

## 2025-05-15 RX ORDER — HYDROCHLOROTHIAZIDE 25 MG/1
25 TABLET ORAL DAILY
Qty: 90 TABLET | Refills: 1 | Status: SHIPPED | OUTPATIENT
Start: 2025-05-15

## 2025-05-20 ENCOUNTER — OFFICE VISIT (OUTPATIENT)
Age: 50
End: 2025-05-20
Payer: MEDICARE

## 2025-05-20 VITALS — BODY MASS INDEX: 51.74 KG/M2 | WEIGHT: 292 LBS | HEIGHT: 63 IN | HEART RATE: 92 BPM | OXYGEN SATURATION: 98 %

## 2025-05-20 DIAGNOSIS — M77.41 METATARSALGIA OF BOTH FEET: ICD-10-CM

## 2025-05-20 DIAGNOSIS — M77.42 METATARSALGIA OF BOTH FEET: ICD-10-CM

## 2025-05-20 DIAGNOSIS — M79.674 PAIN IN TOES OF BOTH FEET: Primary | ICD-10-CM

## 2025-05-20 DIAGNOSIS — L60.3 ONYCHODYSTROPHY: ICD-10-CM

## 2025-05-20 DIAGNOSIS — M79.675 PAIN IN TOES OF BOTH FEET: Primary | ICD-10-CM

## 2025-05-20 DIAGNOSIS — L60.8 PINCER NAIL DEFORMITY: ICD-10-CM

## 2025-05-20 DIAGNOSIS — G62.9 NEUROPATHY: ICD-10-CM

## 2025-05-20 DIAGNOSIS — R26.81 UNSTEADINESS ON FEET: ICD-10-CM

## 2025-05-20 NOTE — PROGRESS NOTES
05/20/2025  Foot and Ankle Surgery - Established Patient/Follow-up  Provider: SHAKILA Avitia   Location: [facility]    Subjective:  Grace Clement is a 50 y.o. female.     Chief Complaint   Patient presents with    Left Foot - Follow-up, Ingrown Toenail    Right Foot - Follow-up, Ingrown Toenail    Follow-Up     Jaye Ogden APRN  5/15/25       History of Present Illness  The patient is a 50-year-old female who presents for an established patient exam with concerns regarding bilateral great toenails.    She rescheduled her appointment due to persistent pain in her bilateral great toes. Her feet are extremely sensitive to cold temperatures, causing significant discomfort. She is not diabetic and is not on any diabetic medication. Neuropathy has been present for approximately 6 years and has progressively worsened. Despite consultations with neurologists, the cause of her neuropathy remains undetermined. Constant pain is experienced in the soles of her feet, toes, heels, and arches, even with minimal activity. Orthopedic shoes designed for neuropathy have provided relief for her heel and arch but have left her with throbbing pain in the balls of her feet and toes. Frustration is expressed with her current situation, feeling confined to her home and unable to participate in activities with her grandsons. Weight loss has been achieved through dietary changes, but increasing physical activity is hindered by foot pain. Her doctor has advised weight loss to help manage health issues, but exercising is challenging due to foot pain. Gabapentin provides some relief but does not completely eliminate the pain. New Skechers shoes provide temporary relief, but this diminishes after a few months.    She also reports a callus on the top of her toe, which is not causing any pain. Efforts are made to keep nails trimmed and avoid wearing tight shoes.    She is currently on oxygen therapy and has noticed an  improvement in her lung function since quitting smoking 6 years ago.    SOCIAL HISTORY  Marital Status:   Tobacco: Quit smoking 6 years ago    FAMILY HISTORY  - Mother: COPD,   - Negative for diabetes       Allergies   Allergen Reactions    Codeine Hives, Itching and Rash       Current Outpatient Medications on File Prior to Visit   Medication Sig Dispense Refill    albuterol (ACCUNEB) 0.63 MG/3ML nebulizer solution Take 3 mL by nebulization Every 6 (Six) Hours As Needed for Wheezing. 360 mL 3    aspirin 81 MG tablet Take 1 tablet by mouth Daily.      atorvastatin (LIPITOR) 20 MG tablet TAKE 1 TABLET BY MOUTH DAILY 90 tablet 1    Breztri Aerosphere 160-9-4.8 MCG/ACT aerosol inhaler       ferrous sulfate 324 (65 Fe) MG tablet delayed-release EC tablet Take 1 tablet by mouth Daily With Breakfast.      fexofenadine (ALLEGRA) 60 MG tablet MUCINEX ALLERGY TABLET      fluticasone (FLONASE) 50 MCG/ACT nasal spray Administer 2 sprays into the nostril(s) as directed by provider Daily.      gabapentin (NEURONTIN) 400 MG capsule TAKE 1 CAPSULE BY MOUTH 3 TIMES A DAY 90 capsule 5    hydroCHLOROthiazide 25 MG tablet Take 1 tablet by mouth Daily. 90 tablet 1    Ibuprofen 3 %, Gabapentin 10 %, Baclofen 2 %, lidocaine 4 %, Ketamine HCl 4 % Apply 1-2 g topically to the appropriate area as directed 3 (Three) to 4 (Four) times daily. 90 g 5    meloxicam (MOBIC) 15 MG tablet TAKE 1 TABLET BY MOUTH DAILY 90 tablet 1    montelukast (SINGULAIR) 10 MG tablet TAKE ONE TABLET BY MOUTH ONCE NIGHTLY 90 tablet 1    multivitamin with minerals (MULTIVITAL PO) Take 1 tablet by mouth Daily.      omeprazole (priLOSEC) 40 MG capsule TAKE 1 CAPSULE BY MOUTH DAILY 30 capsule 3    sertraline (Zoloft) 50 MG tablet Take 1 tablet by mouth Daily. 90 tablet 2    topiramate (Topamax) 50 MG tablet Wk1: 1 tab bid, Wk2: 2 tabs bid, Wk3: 3 tabs bid 180 tablet 3    traZODone (DESYREL) 50 MG tablet Take 1 tablet by mouth Every Night. 30 tablet 0     "vitamin D3 125 MCG (5000 UT) capsule capsule Take 1 capsule by mouth Daily.      albuterol sulfate  (90 Base) MCG/ACT inhaler Inhale 2 puffs As Needed.       No current facility-administered medications on file prior to visit.       Objective   Pulse 92   Ht 160 cm (63\")   Wt 132 kg (292 lb)   SpO2 98%   BMI 51.73 kg/m²     Foot/Ankle Exam  GENERAL  Appearance:  appears stated age and obese  Orientation:  AAOx3  Affect:  appropriate  Gait:  unimpaired  Assistance:  independent  Right shoe gear: tennis shoe  Left shoe gear: tennis shoe     VASCULAR      Right Foot Vascularity   Dorsalis pedis:  2+  Skin temperature:  warm  Edema grading:  Trace  CFT:  < 3 seconds  Pedal hair growth:  Present  Varicosities:  mild varicosities      Left Foot Vascularity   Dorsalis pedis:  2+  Skin temperature:  warm  Edema grading:  Trace  CFT:  < 3 seconds  Pedal hair growth:  Present  Varicosities:  mild varicosities     NEUROLOGIC      Right Foot Neurologic   Light touch sensation: diminished  Protective Sensation using [place] Monofilament:   Sites intact: 5  Sites tested: 10      Left Foot Neurologic   Light touch sensation: diminished  Protective Sensation using [place] Monofilament:   Sites intact: 5  Sites tested: 10     MUSCULOSKELETAL      Right Foot Musculoskeletal   Ecchymosis:  none  Tenderness:  toenail problem        Left Foot Musculoskeletal   Ecchymosis:  none  Tenderness:  toenail problem     DERMATOLOGIC       Right Foot Dermatologic   Skin  Positive for dryness and skin changes.   Nails  1.  Positive for abnormal thickness and dystrophic nail.  2.  Positive for elongated, abnormal thickness and dystrophic nail.  3.  Positive for abnormal thickness and dystrophic nail.  4.  Positive for abnormal thickness and dystrophic nail.  5.  Positive for abnormal thickness and dystrophic nail.      Left Foot Dermatologic   Skin  Positive for dryness and skin changes.   Nails  1.  Positive for " abnormal thickness and dystrophic nail.  2.  Positive for elongated, abnormal thickness and dystrophic nail.  3.  Positive for abnormal thickness and dystrophic nail.  4.  Positive for abnormally thick and dystrophic nail.  5.  Positive for abnormally thick and dystrophic nail.       Physical Exam  Integumentary: Dry skin and callus noted on the top of the right great toe.    Musculoskeletal:  Right Foot: Bilateral great toenails were debrided today. Pincer nails noted on bilateral great toes.  Left Foot: Bilateral great toenails were debrided today. Pincer nails noted on bilateral great toes.       Results       Assessment & Plan   Diagnoses and all orders for this visit:    1. Pain in toes of both feet (Primary)    2. Onychodystrophy    3. Neuropathy    4. Unsteadiness on feet    5. Pincer nail deformity      Assessment & Plan  1. Metatarsalgia:    Powerstep insoles were recommended for [employer] Pieces shoes, to be replaced every 6 months. Stretching exercises targeting calf muscles and Achilles tendon were advised to alleviate forefoot pressure. Foot rolling with a tennis ball was suggested for tissue massage. Swimming or water aerobics were recommended as low-impact exercises to reduce weight and pressure on the feet while providing physical activity. Vionic house shoes were recommended for indoor use to prevent walking barefoot.    Pincer nail deformity/ingrown toenail pain      Epsom salt soaks were recommended, ensuring the water is not too hot due to neuropathy. Nails should be kept appropriately trimmed, avoiding cutting them too short or digging at them. Permanent toenail removal under local anesthesia was discussed, including the procedure, potential discomfort, and recovery time of approximately 8 weeks.    Nail debridement: Both feet x10    Consent and time out was performed before proceeding with the procedure. Nails were debrided with a nail nipper without complication.  No anesthesia was required.   Indications for procedure were thickened, dystrophic, and fungal appearing nails which are difficult to trim.  Proper self-care and technique was discussed with patient.  Patient was stable after procedure.     Follow-up  Follow-up appointment scheduled in 3 months.    PROCEDURE  Nails were trimmed today in the office.       No orders of the defined types were placed in this encounter.         Patient or patient representative verbalized consent for the use of Ambient Listening during the visit with  SHAKILA Rubio for chart documentation. 5/20/2025  16:31 EDT

## 2025-06-08 ENCOUNTER — HOSPITAL ENCOUNTER (EMERGENCY)
Facility: HOSPITAL | Age: 50
Discharge: HOME | End: 2025-06-08
Admitting: EMERGENCY MEDICINE
Payer: MEDICARE

## 2025-06-08 VITALS
DIASTOLIC BLOOD PRESSURE: 63 MMHG | WEIGHT: 284.17 LBS | HEART RATE: 80 BPM | BODY MASS INDEX: 52.29 KG/M2 | RESPIRATION RATE: 16 BRPM | OXYGEN SATURATION: 97 % | SYSTOLIC BLOOD PRESSURE: 124 MMHG | TEMPERATURE: 97.8 F | HEIGHT: 62 IN

## 2025-06-08 DIAGNOSIS — M79.674 GREAT TOE PAIN, RIGHT: ICD-10-CM

## 2025-06-08 DIAGNOSIS — L03.031 PARONYCHIA OF GREAT TOE, RIGHT: Primary | ICD-10-CM

## 2025-06-08 PROCEDURE — 99283 EMERGENCY DEPT VISIT LOW MDM: CPT

## 2025-06-08 RX ORDER — DIAPER,BRIEF,INFANT-TODD,DISP
1 EACH MISCELLANEOUS ONCE
Status: COMPLETED | OUTPATIENT
Start: 2025-06-08 | End: 2025-06-08

## 2025-06-08 RX ADMIN — BACITRACIN 0.9 G: 500 OINTMENT TOPICAL at 14:38

## 2025-06-08 NOTE — DISCHARGE INSTRUCTIONS
Perform warm water soaks 10 to 15 minutes at a time several times throughout the day.  Dry toes well and apply triple antibiotic ointment and cover with Band-Aid.  Can ice 20 minutes at a time multiple times a day with skin barrier for protection as needed for swelling and pain.    Follow-up closely PCP.    Return to the ED for any new or worsening symptoms.

## 2025-06-08 NOTE — ED PROVIDER NOTES
Subjective   History of Present Illness  Patient is a 50-year-old female with PMH of COPD, HLD presenting to the ED for right great toe pain.  Patient states several weeks ago she went to the foot doctor for an ingrown toenail on her left great toe, had all her nails cut at this time.  She states about a week after that she started noticing some discomfort in her right great toe.  Patient states over the past week she has been having worsening pain with swelling, rates her pain an 8 out of 10, worse with walking.  Patient states when she got out of the shower yesterday she noticed green pus around her toe.  She denies any fever, chills, or injuries.  Patient is on 2 L O2 at baseline.        Review of Systems   Constitutional:  Negative for chills and fever.   Skin:         Ingrown toenail of right great toe.       Past Medical History:   Diagnosis Date    Allergic 1994    Anemia 2023    Arthritis     Asthma     COPD (chronic obstructive pulmonary disease)     Difficulty walking April 2020    Hyperlipidemia     Obesity     Vitamin D deficiency        Allergies   Allergen Reactions    Codeine Hives, Itching and Rash       Past Surgical History:   Procedure Laterality Date    CHOLECYSTECTOMY      COLONOSCOPY  7/20/2022    CRYOABLATION      LAPAROSCOPIC TUBAL LIGATION      TUBAL ABDOMINAL LIGATION  6/16/2005       Family History   Problem Relation Age of Onset    Alcohol abuse Mother     COPD Mother     Depression Mother     Vision loss Mother     Early death Mother     Alcohol abuse Father     Early death Father     Alcohol abuse Sister     Depression Sister     Miscarriages / Stillbirths Sister     Learning disabilities Sister     Mental illness Sister     Miscarriages / Stillbirths Sister     Alcohol abuse Brother     COPD Brother     Hyperlipidemia Brother     Drug abuse Brother     Early death Brother     Arthritis Brother     Drug abuse Brother     Cancer Maternal Uncle     Early death Maternal Uncle     Heart  disease Maternal Grandmother     Early death Maternal Grandmother     Anxiety disorder Daughter     Asthma Daughter     Depression Daughter     Drug abuse Daughter     Learning disabilities Daughter     Mental illness Daughter        Social History     Socioeconomic History    Marital status:    Tobacco Use    Smoking status: Former     Current packs/day: 0.00     Average packs/day: 1.5 packs/day for 35.1 years (52.7 ttl pk-yrs)     Types: Cigarettes     Start date: 3/10/1984     Quit date: 2019     Years since quittin.1    Smokeless tobacco: Never   Vaping Use    Vaping status: Former   Substance and Sexual Activity    Alcohol use: No    Drug use: Not Currently     Types: Hydrocodone, Marijuana    Sexual activity: Yes     Partners: Male     Birth control/protection: Post-menopausal           Objective   Physical Exam  Constitutional:       Appearance: Normal appearance.   HENT:      Head: Normocephalic and atraumatic.      Mouth/Throat:      Mouth: Mucous membranes are moist.   Eyes:      Extraocular Movements: Extraocular movements intact.   Cardiovascular:      Rate and Rhythm: Normal rate and regular rhythm.      Pulses: Normal pulses.      Heart sounds: Normal heart sounds.   Pulmonary:      Effort: Pulmonary effort is normal.      Breath sounds: Normal breath sounds.   Abdominal:      General: Abdomen is flat.      Palpations: Abdomen is soft.   Musculoskeletal:         General: Normal range of motion.      Cervical back: Normal range of motion.      Right lower leg: No edema.      Left lower leg: No edema.        Feet:    Feet:      Comments: Paronychia on the right side of right great toe, no abscesses or areas of fluctuance.  Slight area of erythema surrounding.  Capillary refill normal.  Pulses palpated bilaterally.  Skin:     General: Skin is warm and dry.      Capillary Refill: Capillary refill takes less than 2 seconds.   Neurological:      General: No focal deficit present.      Mental  "Status: She is alert and oriented to person, place, and time.   Psychiatric:         Mood and Affect: Mood normal.         Behavior: Behavior normal.         Procedures           ED Course      /63 (BP Location: Right arm)   Pulse 80   Temp 97.8 °F (36.6 °C) (Oral)   Resp 16   Ht 157.5 cm (62\")   Wt 129 kg (284 lb 2.8 oz)   LMP 01/13/2020   SpO2 97%   BMI 51.98 kg/m²   Labs Reviewed - No data to display  Medications   bacitracin ointment 0.9 g (0.9 g Topical Given 6/8/25 2438)     No radiology results for the last day                                                   Medical Decision Making  Chart review: 5/20/25 Lynda JHAVERI Podiatry: The patient is a 50-year-old female who presents for an established patient exam with concerns regarding bilateral great toenails. Her feet are extremely sensitive to cold temperatures, causing significant discomfort. She is not diabetic and is not on any diabetic medication. Neuropathy has been present for approximately 6 years and has progressively worsened. Despite consultations with neurologists, the cause of her neuropathy remains undetermined. Constant pain is experienced in the soles of her feet, toes, heels, and arches, even with minimal activity. Orthopedic shoes designed for neuropathy have provided relief for her heel and arch but have left her with throbbing pain in the balls of her feet and toes. Frustration is expressed with her current situation, feeling confined to her home and unable to participate in activities with her grandsons. Weight loss has been achieved through dietary changes, but increasing physical activity is hindered by foot pain. Her doctor has advised weight loss to help manage health issues, but exercising is challenging due to foot pain. Gabapentin provides some relief but does not completely eliminate the pain. New Skechers shoes provide temporary relief, but this diminishes after a few months. She also reports a callus on the " top of her toe, which is not causing any pain. Efforts are made to keep nails trimmed and avoid wearing tight shoes. She is currently on oxygen therapy and has noticed an improvement in her lung function since quitting smoking 6 years ago.    Patient presented to the ED for the above complaint.    Patient underwent the above exam and evaluation.    Upon reevaluation patient resting comfortably, vital stable.  Bacitracin ointment Band-Aid was applied by nurse.  Educated patient to perform warm water soaks for 10 to 15 minutes at a time, dry toe well and applying over-the-counter triple antibiotic ointment and cover with Band-Aid, repeats multiple times throughout the day until improvement in symptoms, follow-up closely with PCP, and strict return precautions were discussed.  Patient voiced understanding, agreeable with dispo plan.    Labs and imaging were considered and deemed unnecessary, patient afebrile, nontoxic in appearance, no acute distress, localized area of irritation without streaking or signs of abscess.    Appropriate PPE was worn during each patient encounter.    Note Disclaimer: At [facility], we believe that sharing information builds trust and better relationships. You are receiving this note because you are receiving care at [facility] or recently visited. It is possible you will see health information before a provider has talked with you about it. This kind of information can be easy to misunderstand. To help you fully understand what it means for your health, we urge you to discuss this note with your provider.      Problems Addressed:  Great toe pain, right: acute illness or injury  Paronychia of great toe, right: acute illness or injury    Risk  OTC drugs.        Final diagnoses:   Paronychia of great toe, right   Great toe pain, right       ED Disposition  ED Disposition       ED Disposition   Discharge    Condition   Stable    Comment   --               Jaye Ogden, APRN  [address]  [facility] [address]  [facility] [address]  [phone]    Schedule an appointment as soon as possible for a visit            Medication List      No changes were made to your prescriptions during this visit.            Angie Vigil PA-C  06/08/25 145

## 2025-06-18 ENCOUNTER — TELEMEDICINE (OUTPATIENT)
Dept: PSYCHIATRY | Facility: CLINIC | Age: 50
End: 2025-06-18
Payer: MEDICARE

## 2025-06-18 DIAGNOSIS — F32.9 REACTIVE DEPRESSION: ICD-10-CM

## 2025-06-18 DIAGNOSIS — F43.10 POST TRAUMATIC STRESS DISORDER (PTSD): ICD-10-CM

## 2025-06-18 DIAGNOSIS — F41.1 GAD (GENERALIZED ANXIETY DISORDER): Primary | ICD-10-CM
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
## 2025-06-18 NOTE — TREATMENT PLAN
Multi-Disciplinary Problems (from Behavioral Health Treatment Plan)      Active Problems       Problem: Anxiety  Start Date: 06/18/25      Problem Details: The patient self-scales this problem as a 8 with 10 being the worst.          Goal Priority Start Date Expected End Date End Date    Patient will develop and implement behavioral and cognitive strategies to reduce anxiety and irrational fears. -- 06/18/25 12/17/25 --    Goal Details: Progress toward goal:  Not appropriate to rate progress toward goal since this is the initial treatment plan.        Goal Intervention Frequency Start Date End Date    Help patient explore past emotional issues in relation to present anxiety. Weekly 06/18/25 --    Intervention Details: Duration of treatment until remission of symptoms.        Goal Intervention Frequency Start Date End Date    Help patient develop an awareness of their cognitive and physical responses to anxiety. Weekly 06/18/25 --    Intervention Details: Duration of treatment until remission of symptoms.                Problem: Depression  Start Date: 06/18/25      Problem Details: The patient self-scales this problem as a 7 with 10 being the worst.          Goal Priority Start Date Expected End Date End Date    Patient will demonstrate the ability to initiate new constructive life skills outside of sessions on a consistent basis. -- 06/18/25 12/17/25 --    Goal Details: Progress toward goal:  Not appropriate to rate progress toward goal since this is the initial treatment plan.        Goal Intervention Frequency Start Date End Date    Assist patient in setting attainable activities of daily living goals. Daily 06/18/25 --    Intervention Details: Patient will work to increase daily mobility and exercise, maintain healthy diet, identify and process activating emotional events, and finding ways to reduce daily stressors when possible.          Goal Intervention Frequency Start Date End Date    Provide education about  depression Weekly 06/18/25 --    Intervention Details: Duration of treatment until remission of symptoms.        Goal Intervention Frequency Start Date End Date    Assist patient in developing healthy coping strategies. Weekly 06/18/25 --    Intervention Details: Duration of treatment until remission of symptoms.                Problem: Trauma and Stressor Related Disorders  Start Date: 06/18/25      Problem Details: The patient self-scales this problem as a 9 with 10 being the worst.          Goal Priority Start Date Expected End Date End Date    Patient will process and move through trauma in a way that improves self regard and the patients ability to function optimally in the world around them. -- 06/18/25 12/17/25 --    Goal Details: Progress toward goal:  Not appropriate to rate progress toward goal since this is the initial treatment plan.        Goal Intervention Frequency Start Date End Date    Assist patient in identifying ways that trauma has negatively impacted their view of themselves and the world. Weekly 06/18/25 --    Intervention Details: Duration of treatment until remission of symptoms.        Goal Intervention Frequency Start Date End Date    Process trauma in the context of the safe session environment. Weekly 06/18/25 --    Intervention Details: Duration of treatment until discharged.        Goal Intervention Frequency Start Date End Date    Develop a plan of behavior changes that will reduce the stress of the trauma. Weekly 06/18/25 --    Intervention Details: Duration of treatment until remission of symptoms.                        Reviewed By       Naveen Rodriguez III, [facility] 06/18/25 1221                     I have discussed and reviewed this treatment plan with the patient.

## 2025-06-18 NOTE — PLAN OF CARE
Patient will learn to verbalize thoughts and emotions during sessions, work towards identifying and processing activating emotional events, build self confidence and self esteem through engaging in positive affirmations, increase assertive communication with  and family, learn to establish boundaries with family members, gain understanding of the impacts of adverse childhood experiences and trauma has on adult thinking and emotions, learn to increase self advocacy while caring for grandchildren, and learn to utilize CBT to improve thinking and emotions.

## 2025-06-24 ENCOUNTER — OFFICE VISIT (OUTPATIENT)
Age: 50
End: 2025-06-24
Payer: MEDICARE

## 2025-06-24 VITALS — WEIGHT: 285 LBS | BODY MASS INDEX: 52.44 KG/M2 | HEART RATE: 81 BPM | HEIGHT: 62 IN | OXYGEN SATURATION: 95 %

## 2025-06-24 DIAGNOSIS — M77.42 METATARSALGIA OF BOTH FEET: ICD-10-CM

## 2025-06-24 DIAGNOSIS — L60.3 ONYCHODYSTROPHY: Primary | ICD-10-CM

## 2025-06-24 DIAGNOSIS — L60.8 PINCER NAIL DEFORMITY: ICD-10-CM

## 2025-06-24 DIAGNOSIS — M79.674 PAIN OF RIGHT GREAT TOE: ICD-10-CM

## 2025-06-24 DIAGNOSIS — M77.41 METATARSALGIA OF BOTH FEET: ICD-10-CM

## 2025-06-24 PROCEDURE — 1159F MED LIST DOCD IN RCRD: CPT

## 2025-06-24 PROCEDURE — 11750 EXCISION NAIL&NAIL MATRIX: CPT

## 2025-06-24 PROCEDURE — 99213 OFFICE O/P EST LOW 20 MIN: CPT

## 2025-06-24 PROCEDURE — 1160F RVW MEDS BY RX/DR IN RCRD: CPT

## 2025-06-24 NOTE — PROGRESS NOTES
"06/24/2025  Foot and Ankle Surgery - Established Patient/Follow-up  Provider: SHAKILA Avitia   Location: [facility]    Subjective:  Grace Clement is a 50 y.o. female.     Chief Complaint   Patient presents with    Left Foot - Follow-up, Ingrown Toenail    Right Foot - Follow-up, Ingrown Toenail    Follow-Up     Jaye Ogden APRN  5/15/25       History of Present Illness  The patient is a 50-year-old female who presents for a routine foot check and follow-up regarding toenail deformity.    She reports severe pain in her right big toe due to an ingrown toenail. Despite attempts at self-care, including foot baths and nail trimming, the condition has not improved. She is unable to take Tylenol or ibuprofen due to potential interactions with her current medications. Her primary care physician advised against the use of ibuprofen, while her neurologist recommended avoiding Tylenol. She has neuropathy and typically wears house shoes at home and socks to prevent a sensation of coldness in her feet. Reports aches and \"tight\"ness to both reet.     She has idiopathic intracranial hypertension and takes medication to manage excess spinal fluid on her brain.    SOCIAL HISTORY  Occupations: The patient has two kids at home.       Allergies   Allergen Reactions    Codeine Hives, Itching and Rash       Current Outpatient Medications on File Prior to Visit   Medication Sig Dispense Refill    albuterol (ACCUNEB) 0.63 MG/3ML nebulizer solution Take 3 mL by nebulization Every 6 (Six) Hours As Needed for Wheezing. 360 mL 3    aspirin 81 MG tablet Take 1 tablet by mouth Daily.      atorvastatin (LIPITOR) 20 MG tablet TAKE 1 TABLET BY MOUTH DAILY 90 tablet 1    Breztri Aerosphere 160-9-4.8 MCG/ACT aerosol inhaler       ferrous sulfate 324 (65 Fe) MG tablet delayed-release EC tablet Take 1 tablet by mouth Daily With Breakfast.      fexofenadine (ALLEGRA) 60 MG tablet MUCINEX ALLERGY TABLET      fluticasone " "(FLONASE) 50 MCG/ACT nasal spray Administer 2 sprays into the nostril(s) as directed by provider Daily.      gabapentin (NEURONTIN) 400 MG capsule TAKE 1 CAPSULE BY MOUTH 3 TIMES A DAY 90 capsule 5    hydroCHLOROthiazide 25 MG tablet Take 1 tablet by mouth Daily. 90 tablet 1    Ibuprofen 3 %, Gabapentin 10 %, Baclofen 2 %, lidocaine 4 %, Ketamine HCl 4 % Apply 1-2 g topically to the appropriate area as directed 3 (Three) to 4 (Four) times daily. 90 g 5    meloxicam (MOBIC) 15 MG tablet TAKE 1 TABLET BY MOUTH DAILY 90 tablet 1    montelukast (SINGULAIR) 10 MG tablet TAKE ONE TABLET BY MOUTH ONCE NIGHTLY 90 tablet 1    multivitamin with minerals (MULTIVITAL PO) Take 1 tablet by mouth Daily.      omeprazole (priLOSEC) 40 MG capsule TAKE 1 CAPSULE BY MOUTH DAILY 30 capsule 3    sertraline (Zoloft) 50 MG tablet Take 1 tablet by mouth Daily. 90 tablet 2    topiramate (Topamax) 50 MG tablet Wk1: 1 tab bid, Wk2: 2 tabs bid, Wk3: 3 tabs bid 180 tablet 3    traZODone (DESYREL) 50 MG tablet Take 1 tablet by mouth Every Night. 30 tablet 0    vitamin D3 125 MCG (5000 UT) capsule capsule Take 1 capsule by mouth Daily.      albuterol sulfate  (90 Base) MCG/ACT inhaler Inhale 2 puffs As Needed.       No current facility-administered medications on file prior to visit.       Objective   Pulse 81   Ht 157.5 cm (62\")   Wt 129 kg (285 lb)   LMP 2020   SpO2 95%   PF (!) 2 L/min   BMI 52.13 kg/m²     Foot/Ankle Exam    GENERAL  Appearance:  appears stated age  Orientation:  AAOx3  Affect:  appropriate  Gait:  unimpaired  Assistance:  independent  Right shoe gear: casual shoe and sock  Left shoe gear: casual shoe and sock    VASCULAR     Right Foot Vascularity   Dorsalis pedis:  2+  Skin temperature:  warm  Edema gradin+  CFT:  < 3 seconds  Pedal hair growth:  Present  Varicosities:  none     Left Foot Vascularity   Dorsalis pedis:  2+  Skin temperature:  warm  Edema gradin+  CFT:  < 3 seconds  Pedal hair " growth:  Present  Varicosities:  none     NEUROLOGIC     Right Foot Neurologic   Light touch sensation: normal  Protective Sensation using Soudan-Brenna Monofilament:   Sites intact: 5  Sites tested: 10     Left Foot Neurologic   Light touch sensation: normal  Protective Sensation using Soudan-Brenna Monofilament:   Sites intact: 5  Sites tested: 10    MUSCULOSKELETAL     Right Foot Musculoskeletal   Ecchymosis:  none  Tenderness:  toe 1 tenderness and toenail problem       Left Foot Musculoskeletal   Ecchymosis:  none  Tenderness:  metatarsals tenderness    DERMATOLOGIC      Right Foot Dermatologic   Skin  Positive for dryness and skin changes.   Nails  1.  Positive for ingrown toenail and pincer.     Left Foot Dermatologic   Skin  Positive for dryness and skin changes.   Physical Exam  Musculoskeletal:  Right big toe: Ingrown toenail with excruciating pain       Results       Assessment & Plan   Diagnoses and all orders for this visit:    1. Onychodystrophy (Primary)    2. Pincer nail deformity    3. Metatarsalgia of both feet    4. Pain of right great toe      Assessment & Plan  1. Ingrown toenail:  Reports excruciating pain in the right big toe due to an ingrown toenail. The procedure for partial nail removal was explained, including the use of local anesthesia with four injections and the application of acid to prevent nail regrowth. Recovery is expected to take about 8 weeks, during which redness, swelling, and oozing may occur. Pain management with Tylenol or ibuprofen was discussed, although potential contraindications with current medications were noted. Epsom salt soaks, leg elevation, and limited activity were recommended. Wearing open-toed shoes for comfort and avoiding public pools, lakes, or oceans until the wound is fully healed were advised.    Total Nail Avulsion with Chemical Matrixectomy: right hallux    Consent and time out was performed before proceeding with the procedure.  Right hallux  was cleansed with alcohol and the digit was blocked in a ring block fashion utilizing 8 mL of 1% lidocaine plain.  A digital tourniquet was applied to the digit.  The nail was lifted from the nail bed and nail margin with a Sharon.  The offending nail margins were grasped with a hemostat and removed. The nail borders were explored with a curette to ensure adequate removal.  Matrixectomy was performed utilizing three 30 second applications of 89% phenol on a micro-tip applicator.  The area was then rinsed with alcohol to dilute the phenol. The nail fold and exposed nail bed were flushed with normal saline.  Antibiotic ointment followed by sterile compressive dressings were then applied.  The digital tourniquot was removed.  No excessive bleeding or complications were evident.  The patient tolerated procedure and local anesthetic well.     Discussed with patient that to treat foot pain recommend better support with use of tennis shoes and otc insoles worn 90% of day.     Follow up 8 weeks.                        No orders of the defined types were placed in this encounter.         Patient or patient representative verbalized consent for the use of Ambient Listening during the visit with  SHAKILA Rubio for chart documentation. 6/24/2025  12:51 EDT

## 2025-07-12 DIAGNOSIS — M79.642 PAIN IN BOTH HANDS: ICD-10-CM

## 2025-07-12 DIAGNOSIS — M79.641 PAIN IN BOTH HANDS: ICD-10-CM

## 2025-07-14 ENCOUNTER — TELEPHONE (OUTPATIENT)
Age: 50
End: 2025-07-14
Payer: MEDICARE

## 2025-07-14 RX ORDER — MELOXICAM 15 MG/1
15 TABLET ORAL DAILY
Qty: 90 TABLET | Refills: 1 | Status: SHIPPED | OUTPATIENT
Start: 2025-07-14

## 2025-07-14 NOTE — TELEPHONE ENCOUNTER
Caller: Grace Clement     Relationship: SELF    Best call back number: [phone]    What is your medical concern? SWELLING AND PAIN WHERE TOENAIL WAS REMOVED    How long has this issue been going on? SINCE REMOVED 6/24/25    Is your provider already aware of this issue? NO    Have you been treated for this issue?     HUB ATTEMPTED TO WT

## 2025-07-14 NOTE — TELEPHONE ENCOUNTER
I spoke with patient. She states 3 days ago toe started swelling and pain. No fever or drainage. I asked her to take a picture and upload so Martah can look at

## 2025-07-14 NOTE — TELEPHONE ENCOUNTER
Caller: Grace Clement    Relationship to patient: Self    Best call back number: [phone]    Patient is needing: PATIENT CALLED BACK - STATES PREVIOUS CALL GOT DISCONNECTED - UNABLE TO WT

## 2025-07-15 ENCOUNTER — TELEPHONE (OUTPATIENT)
Age: 50
End: 2025-07-15

## 2025-07-15 ENCOUNTER — OFFICE VISIT (OUTPATIENT)
Age: 50
End: 2025-07-15
Payer: MEDICARE

## 2025-07-15 VITALS — BODY MASS INDEX: 52.44 KG/M2 | WEIGHT: 285 LBS | HEIGHT: 62 IN | RESPIRATION RATE: 20 BRPM

## 2025-07-15 DIAGNOSIS — G62.9 NEUROPATHY: ICD-10-CM

## 2025-07-15 DIAGNOSIS — S91.209S TOENAIL AVULSION, SEQUELA: ICD-10-CM

## 2025-07-15 DIAGNOSIS — M79.674 PAIN OF RIGHT GREAT TOE: Primary | ICD-10-CM

## 2025-07-15 PROCEDURE — 99213 OFFICE O/P EST LOW 20 MIN: CPT

## 2025-07-15 RX ORDER — DOXYCYCLINE HYCLATE 100 MG
100 TABLET ORAL 2 TIMES DAILY
Qty: 20 TABLET | Refills: 0 | Status: SHIPPED | OUTPATIENT
Start: 2025-07-15 | End: 2025-07-25

## 2025-07-15 NOTE — TELEPHONE ENCOUNTER
Hub staff attempted to follow warm transfer process and was unsuccessful     Caller: Grace Clement    Relationship to patient: Self    Best call back number: [phone]    Patient is needing: PT CALLING TO RESCHEDULE APPT WITH ZAID GÓMEZ ON JULY 22 TO 1:00PM ON JULY 22. PLEASE CONTACT PT TO RESCHEDULE.

## 2025-07-15 NOTE — PROGRESS NOTES
07/15/2025  Foot and Ankle Surgery - Established Patient/Follow-up  Provider: SHAKILA Avitia   Location: [facility]    Subjective:  Grace Clement is a 50 y.o. female.     Chief Complaint   Patient presents with    Right Foot - Follow-up, Ingrown Toenail     Nail bed infected    Follow-up     PCP: Jaye Ogden APRN  Last PCP Visit: 5/15/25       History of Present Illness  The patient presents today with concerns of right toe pain following a chemical matrixectomy of the right great toenail approximately 2 to 3 weeks ago.    She reports that her toe was initially fine but has since become enlarged. She is not diabetic and has no history of heart arrhythmias. She has been managing the condition by soaking the toe in Epsom salt and cleaning it with antibiotic soap. She has not applied Neosporin as per previous advice. She changes the Band-Aid daily and ensures that she wears clean socks after each cleaning and bandage change. She also uses rubber gloves for hygiene purposes. She notes that the toe appears more yellow today than it did two days ago when it was white. She has been performing Epsom salt baths once a day.    She expresses interest in undergoing a neuropathy test or screening and inquires if this can be done while her toe is in its current state or if she should wait until it heals.    SOCIAL HISTORY  Marital Status:     FAMILY HISTORY  - Brother: Toe infection leading to loss of three toes on the right foot.       Allergies   Allergen Reactions    Codeine Hives, Itching and Rash       Current Outpatient Medications on File Prior to Visit   Medication Sig Dispense Refill    albuterol (ACCUNEB) 0.63 MG/3ML nebulizer solution Take 3 mL by nebulization Every 6 (Six) Hours As Needed for Wheezing. 360 mL 3    aspirin 81 MG tablet Take 1 tablet by mouth Daily.      atorvastatin (LIPITOR) 20 MG tablet TAKE 1 TABLET BY MOUTH DAILY 90 tablet 1    Breztri Aerosphere 160-9-4.8 MCG/ACT  "aerosol inhaler       ferrous sulfate 324 (65 Fe) MG tablet delayed-release EC tablet Take 1 tablet by mouth Daily With Breakfast.      fexofenadine (ALLEGRA) 60 MG tablet MUCINEX ALLERGY TABLET      fluticasone (FLONASE) 50 MCG/ACT nasal spray Administer 2 sprays into the nostril(s) as directed by provider Daily.      gabapentin (NEURONTIN) 400 MG capsule TAKE 1 CAPSULE BY MOUTH 3 TIMES A DAY 90 capsule 5    hydroCHLOROthiazide 25 MG tablet Take 1 tablet by mouth Daily. 90 tablet 1    Ibuprofen 3 %, Gabapentin 10 %, Baclofen 2 %, lidocaine 4 %, Ketamine HCl 4 % Apply 1-2 g topically to the appropriate area as directed 3 (Three) to 4 (Four) times daily. 90 g 5    meloxicam (MOBIC) 15 MG tablet TAKE 1 TABLET BY MOUTH DAILY 90 tablet 1    montelukast (SINGULAIR) 10 MG tablet TAKE ONE TABLET BY MOUTH ONCE NIGHTLY 90 tablet 1    multivitamin with minerals (MULTIVITAL PO) Take 1 tablet by mouth Daily.      omeprazole (priLOSEC) 40 MG capsule TAKE 1 CAPSULE BY MOUTH DAILY 30 capsule 3    sertraline (Zoloft) 50 MG tablet Take 1 tablet by mouth Daily. 90 tablet 2    topiramate (Topamax) 50 MG tablet Wk1: 1 tab bid, Wk2: 2 tabs bid, Wk3: 3 tabs bid 180 tablet 3    traZODone (DESYREL) 50 MG tablet Take 1 tablet by mouth Every Night. 30 tablet 0    vitamin D3 125 MCG (5000 UT) capsule capsule Take 1 capsule by mouth Daily.      albuterol sulfate  (90 Base) MCG/ACT inhaler Inhale 2 puffs As Needed.       No current facility-administered medications on file prior to visit.       Objective   Resp 20   Ht 157.5 cm (62\")   Wt 129 kg (285 lb)   LMP 01/13/2020   BMI 52.13 kg/m²     Foot/Ankle Exam    GENERAL  Appearance:  appears stated age  Orientation:  AAOx3  Affect:  appropriate  Gait:  unimpaired  Assistance:  independent  Right shoe gear: surgical shoe    VASCULAR     Right Foot Vascularity   Dorsalis pedis:  2+  Skin temperature:  warm  Edema grading:  Trace    MUSCULOSKELETAL     Right Foot Musculoskeletal "   Tenderness:  toe 1 tenderness      DERMATOLOGIC      Right Foot Dermatologic   Skin  Positive for skin changes.   Physical Exam  Musculoskeletal:  Right great toe: Possible signs of infection with small necrotic tissue at the lateral nail border. Right lateral nail border necrotic area measuring approx 1x0.2x0.2cm slough, faint foul odor, small  to moderate slight greenish drainage. No extensive purulent drainage or erythema noted.        Results       Assessment & Plan   Diagnoses and all orders for this visit:    1. Pain of right great toe (Primary)    2. Neuropathy    3. Toenail avulsion, sequela    Other orders  -     doxycycline (VIBRAMYICN) 100 MG tablet; Take 1 tablet by mouth 2 (Two) Times a Day for 10 days.  Dispense: 20 tablet; Refill: 0      Assessment & Plan  1. Post-chemical matrixectomy status of the right great toenail:  The right great toe shows possible signs of infection with small necrotic tissue at the lateral nail border. Doxycycline will be taken twice daily for 10 days. Betadine should be applied twice daily, followed by covering the area with gauze and tape. The use of a surgical shoe is recommended to prevent friction on the affected area. Additionally recommend spacing toes using gauze or cotton swaps to prevent friction/pressure.  Epsom salt soaks are no longer necessary. Clean the toe in the shower by letting soapy water run over it, then dry it thoroughly before applying Betadine and gauze. If symptoms worsen, such as spreading redness, purulent drainge, fever, or chills, immediate contact with the clinic or a visit to the emergency room is advised. If there is no improvement in a week, a repeat procedure may be considered to address potential retained toenail fragments.    Discussed if symptoms have not improved or worsen pt may require a second nail procedure as symptoms may be caused from retained nail spicule.       Follow-up: The patient will follow up in 1 week.       No orders  of the defined types were placed in this encounter.         Patient or patient representative verbalized consent for the use of Ambient Listening during the visit with  SHAKILA Rubio for chart documentation. 7/15/2025  17:29 EDT

## 2025-07-24 ENCOUNTER — OFFICE VISIT (OUTPATIENT)
Age: 50
End: 2025-07-24
Payer: MEDICARE

## 2025-07-24 VITALS — HEIGHT: 62 IN | BODY MASS INDEX: 53.73 KG/M2 | HEART RATE: 72 BPM | OXYGEN SATURATION: 99 % | WEIGHT: 292 LBS

## 2025-07-24 DIAGNOSIS — G62.9 NEUROPATHY: ICD-10-CM

## 2025-07-24 DIAGNOSIS — M79.674 PAIN OF RIGHT GREAT TOE: Primary | ICD-10-CM

## 2025-07-24 DIAGNOSIS — S91.209S TOENAIL AVULSION, SEQUELA: ICD-10-CM

## 2025-07-24 NOTE — PROGRESS NOTES
07/24/2025  Foot and Ankle Surgery - Established Patient/Follow-up  Provider: SHAKILA Avitia   Location: [facility]    Subjective:  Grace Clement is a 50 y.o. female.     Chief Complaint   Patient presents with    Right Foot - Follow-up, Ingrown Toenail     Nail bed infected    Follow-up     PCP: Jaye Ogden APRN  Last PCP Visit: 5/15/25         HPI: Patient presents today for reevaluation of right great toe after total nail avulsion with matrixectomy approximately 4 weeks ago.  She again reports that about 2 weeks after the procedure she started having increased pain and noticed some drainage to the right lateral nail border.  She was placed on oral doxycycline and has been taking as directed as well as wearing an offloading surgical shoe and painting toe with Betadine and covering with Band-Aid twice daily.  She is offloading the toe from pressure by using gauze between the great toe and second toe.  She is also using Epsom salt soaks daily.  She feels toe has improved however acknowledges pain in the right lateral proximal nail fold.  Patient denies history of diabetes or previous delayed wound healing in the lower extremity or any problems with blood flow that she is aware of.  Patient reports that she does not want to have any procedures done on the toe today because her sister is having surgery and she wants to go to the hospital with her as soon as possible.  Patient denies any fever chills or malaise.    Allergies   Allergen Reactions    Codeine Hives, Itching and Rash       Current Outpatient Medications on File Prior to Visit   Medication Sig Dispense Refill    albuterol (ACCUNEB) 0.63 MG/3ML nebulizer solution Take 3 mL by nebulization Every 6 (Six) Hours As Needed for Wheezing. 360 mL 3    aspirin 81 MG tablet Take 1 tablet by mouth Daily.      atorvastatin (LIPITOR) 20 MG tablet TAKE 1 TABLET BY MOUTH DAILY 90 tablet 1    Breztri Aerosphere 160-9-4.8 MCG/ACT aerosol inhaler     "   doxycycline (VIBRAMYICN) 100 MG tablet Take 1 tablet by mouth 2 (Two) Times a Day for 10 days. 20 tablet 0    ferrous sulfate 324 (65 Fe) MG tablet delayed-release EC tablet Take 1 tablet by mouth Daily With Breakfast.      fexofenadine (ALLEGRA) 60 MG tablet MUCINEX ALLERGY TABLET      fluticasone (FLONASE) 50 MCG/ACT nasal spray Administer 2 sprays into the nostril(s) as directed by provider Daily.      gabapentin (NEURONTIN) 400 MG capsule TAKE 1 CAPSULE BY MOUTH 3 TIMES A DAY 90 capsule 5    hydroCHLOROthiazide 25 MG tablet Take 1 tablet by mouth Daily. 90 tablet 1    Ibuprofen 3 %, Gabapentin 10 %, Baclofen 2 %, lidocaine 4 %, Ketamine HCl 4 % Apply 1-2 g topically to the appropriate area as directed 3 (Three) to 4 (Four) times daily. 90 g 5    meloxicam (MOBIC) 15 MG tablet TAKE 1 TABLET BY MOUTH DAILY 90 tablet 1    montelukast (SINGULAIR) 10 MG tablet TAKE ONE TABLET BY MOUTH ONCE NIGHTLY 90 tablet 1    multivitamin with minerals (MULTIVITAL PO) Take 1 tablet by mouth Daily.      omeprazole (priLOSEC) 40 MG capsule TAKE 1 CAPSULE BY MOUTH DAILY 30 capsule 3    sertraline (Zoloft) 50 MG tablet Take 1 tablet by mouth Daily. 90 tablet 2    topiramate (Topamax) 50 MG tablet Wk1: 1 tab bid, Wk2: 2 tabs bid, Wk3: 3 tabs bid 180 tablet 3    traZODone (DESYREL) 50 MG tablet Take 1 tablet by mouth Every Night. 30 tablet 0    vitamin D3 125 MCG (5000 UT) capsule capsule Take 1 capsule by mouth Daily.      albuterol sulfate  (90 Base) MCG/ACT inhaler Inhale 2 puffs As Needed.       No current facility-administered medications on file prior to visit.       Objective   Pulse 72   Ht 157.5 cm (62\")   Wt 132 kg (292 lb)   LMP 01/13/2020   SpO2 99%   BMI 53.41 kg/m²     Foot/Ankle Exam    GENERAL  Appearance:  appears stated age  Orientation:  AAOx3  Affect:  appropriate  Gait:  unimpaired  Assistance:  independent  Right shoe gear: surgical shoe    VASCULAR     Right Foot Vascularity   Dorsalis pedis:  " 2+  Skin temperature:  warm  Edema grading:  None  CFT:  < 3 seconds  Pedal hair growth:  Absent  Varicosities:  mild varicosities     NEUROLOGIC     Right Foot Neurologic   Light touch sensation: normal    MUSCULOSKELETAL     Right Foot Musculoskeletal   Ecchymosis:  none  Tenderness:  toe 1 tenderness      DERMATOLOGIC      Right Foot Dermatologic   Skin  Positive for skin changes.   Nails  1.  Absent.    Assessment & Plan   Diagnoses and all orders for this visit:    1. Pain of right great toe (Primary)    2. Toenail avulsion, sequela    3. Neuropathy        On exam right great toe has improved with current treatment regimen.  I did discuss with patient that it is very probable she has remaining nail remnant that is causing discomfort.  We discussed today that this may continue to impede healing however wound can eventually heal and patient would just have a spicule of nail that continues to grow which is not ideal with total nail removal.  Patient has no overt signs of infection on exam no significant drainage.  Layer of yellow slough is noted in the wound bed consistent with recent set of phenol.  Discussed with patient that I do recommend that she have repeat nail avulsion to definitively cure the issue however patient declines at this time.  She will follow-up with me in a week or 2 for reevaluation and may consider repeat procedure at that time.  Is to continue to monitor for any worsening signs to the toe or signs of infection and seek urgent care should they occur before scheduled appointment.        No orders of the defined types were placed in this encounter.         Note is dictated utilizing voice recognition software. Unfortunately this leads to occasional typographical errors. I apologize in advance if the situation occurs. If questions occur please do not hesitate to call our office.

## 2025-08-08 ENCOUNTER — OFFICE VISIT (OUTPATIENT)
Age: 50
End: 2025-08-08
Payer: MEDICARE

## 2025-08-08 VITALS — OXYGEN SATURATION: 95 % | HEART RATE: 100 BPM | HEIGHT: 62 IN | WEIGHT: 292 LBS | BODY MASS INDEX: 53.73 KG/M2

## 2025-08-08 DIAGNOSIS — S91.209S TOENAIL AVULSION, SEQUELA: ICD-10-CM

## 2025-08-08 DIAGNOSIS — M79.674 PAIN OF RIGHT GREAT TOE: Primary | ICD-10-CM

## 2025-08-08 PROCEDURE — 1159F MED LIST DOCD IN RCRD: CPT | Performed by: PODIATRIST

## 2025-08-08 PROCEDURE — 99213 OFFICE O/P EST LOW 20 MIN: CPT | Performed by: PODIATRIST

## 2025-08-08 PROCEDURE — 1160F RVW MEDS BY RX/DR IN RCRD: CPT | Performed by: PODIATRIST

## 2025-08-22 RX ORDER — ATORVASTATIN CALCIUM 20 MG/1
20 TABLET, FILM COATED ORAL DAILY
Qty: 90 TABLET | Refills: 1 | Status: SHIPPED | OUTPATIENT
Start: 2025-08-22

## 2025-08-27 DIAGNOSIS — R10.13 EPIGASTRIC PAIN: ICD-10-CM

## 2025-08-27 RX ORDER — OMEPRAZOLE 40 MG/1
40 CAPSULE, DELAYED RELEASE ORAL DAILY
Qty: 90 CAPSULE | Refills: 1 | Status: SHIPPED | OUTPATIENT
Start: 2025-08-27